# Patient Record
Sex: MALE | Race: WHITE | Employment: UNEMPLOYED | URBAN - METROPOLITAN AREA
[De-identification: names, ages, dates, MRNs, and addresses within clinical notes are randomized per-mention and may not be internally consistent; named-entity substitution may affect disease eponyms.]

---

## 2017-02-14 ENCOUNTER — HOSPITAL ENCOUNTER (INPATIENT)
Age: 62
LOS: 1 days | Discharge: HOME | DRG: 066 | End: 2017-02-15
Attending: EMERGENCY MEDICINE | Admitting: INTERNAL MEDICINE
Payer: MEDICARE

## 2017-02-14 ENCOUNTER — APPOINTMENT (OUTPATIENT)
Dept: MRI IMAGING | Age: 62
DRG: 066 | End: 2017-02-14
Attending: EMERGENCY MEDICINE
Payer: MEDICARE

## 2017-02-14 ENCOUNTER — APPOINTMENT (OUTPATIENT)
Dept: CT IMAGING | Age: 62
DRG: 066 | End: 2017-02-14
Attending: EMERGENCY MEDICINE
Payer: MEDICARE

## 2017-02-14 ENCOUNTER — APPOINTMENT (OUTPATIENT)
Dept: GENERAL RADIOLOGY | Age: 62
DRG: 066 | End: 2017-02-14
Attending: EMERGENCY MEDICINE
Payer: MEDICARE

## 2017-02-14 DIAGNOSIS — H49.21 ABDUCENS NERVE PALSY, RIGHT: ICD-10-CM

## 2017-02-14 DIAGNOSIS — R11.10 VOMITING, INTRACTABILITY OF VOMITING NOT SPECIFIED, PRESENCE OF NAUSEA NOT SPECIFIED, UNSPECIFIED VOMITING TYPE: ICD-10-CM

## 2017-02-14 DIAGNOSIS — H53.2 DOUBLE VISION: Primary | ICD-10-CM

## 2017-02-14 PROBLEM — I10 ACCELERATED HYPERTENSION: Chronic | Status: ACTIVE | Noted: 2017-02-14

## 2017-02-14 PROBLEM — Z86.73 HISTORY OF CVA (CEREBROVASCULAR ACCIDENT): Chronic | Status: ACTIVE | Noted: 2017-02-14

## 2017-02-14 LAB
ALBUMIN SERPL BCP-MCNC: 3.9 G/DL (ref 3.5–5)
ALBUMIN/GLOB SERPL: 0.9 {RATIO} (ref 1.1–2.2)
ALP SERPL-CCNC: 124 U/L (ref 45–117)
ALT SERPL-CCNC: 22 U/L (ref 12–78)
ANION GAP BLD CALC-SCNC: 9 MMOL/L (ref 5–15)
AST SERPL W P-5'-P-CCNC: 17 U/L (ref 15–37)
ATRIAL RATE: 67 BPM
BASOPHILS # BLD AUTO: 0.1 K/UL (ref 0–0.1)
BASOPHILS # BLD: 1 % (ref 0–1)
BILIRUB SERPL-MCNC: 0.7 MG/DL (ref 0.2–1)
BUN SERPL-MCNC: 11 MG/DL (ref 6–20)
BUN/CREAT SERPL: 9 (ref 12–20)
CALCIUM SERPL-MCNC: 8.7 MG/DL (ref 8.5–10.1)
CALCULATED P AXIS, ECG09: 66 DEGREES
CALCULATED R AXIS, ECG10: 17 DEGREES
CALCULATED T AXIS, ECG11: 83 DEGREES
CHLORIDE SERPL-SCNC: 103 MMOL/L (ref 97–108)
CO2 SERPL-SCNC: 27 MMOL/L (ref 21–32)
CREAT SERPL-MCNC: 1.17 MG/DL (ref 0.7–1.3)
DIAGNOSIS, 93000: NORMAL
EOSINOPHIL # BLD: 0.8 K/UL (ref 0–0.4)
EOSINOPHIL NFR BLD: 6 % (ref 0–7)
ERYTHROCYTE [DISTWIDTH] IN BLOOD BY AUTOMATED COUNT: 13.7 % (ref 11.5–14.5)
EST. AVERAGE GLUCOSE BLD GHB EST-MCNC: 120 MG/DL
GLOBULIN SER CALC-MCNC: 4.2 G/DL (ref 2–4)
GLUCOSE SERPL-MCNC: 121 MG/DL (ref 65–100)
HBA1C MFR BLD: 5.8 % (ref 4.2–6.3)
HCT VFR BLD AUTO: 46.7 % (ref 36.6–50.3)
HGB BLD-MCNC: 15.5 G/DL (ref 12.1–17)
LYMPHOCYTES # BLD AUTO: 16 % (ref 12–49)
LYMPHOCYTES # BLD: 2.4 K/UL (ref 0.8–3.5)
MCH RBC QN AUTO: 30.2 PG (ref 26–34)
MCHC RBC AUTO-ENTMCNC: 33.2 G/DL (ref 30–36.5)
MCV RBC AUTO: 91 FL (ref 80–99)
MONOCYTES # BLD: 0.9 K/UL (ref 0–1)
MONOCYTES NFR BLD AUTO: 6 % (ref 5–13)
NEUTS SEG # BLD: 10.6 K/UL (ref 1.8–8)
NEUTS SEG NFR BLD AUTO: 71 % (ref 32–75)
P-R INTERVAL, ECG05: 168 MS
PLATELET # BLD AUTO: 273 K/UL (ref 150–400)
POTASSIUM SERPL-SCNC: 4 MMOL/L (ref 3.5–5.1)
PROT SERPL-MCNC: 8.1 G/DL (ref 6.4–8.2)
Q-T INTERVAL, ECG07: 430 MS
QRS DURATION, ECG06: 118 MS
QTC CALCULATION (BEZET), ECG08: 454 MS
RBC # BLD AUTO: 5.13 M/UL (ref 4.1–5.7)
SODIUM SERPL-SCNC: 139 MMOL/L (ref 136–145)
TROPONIN I SERPL-MCNC: <0.04 NG/ML
VENTRICULAR RATE, ECG03: 67 BPM
WBC # BLD AUTO: 14.7 K/UL (ref 4.1–11.1)

## 2017-02-14 PROCEDURE — 74011000250 HC RX REV CODE- 250: Performed by: INTERNAL MEDICINE

## 2017-02-14 PROCEDURE — 96374 THER/PROPH/DIAG INJ IV PUSH: CPT

## 2017-02-14 PROCEDURE — 74011250637 HC RX REV CODE- 250/637: Performed by: INTERNAL MEDICINE

## 2017-02-14 PROCEDURE — 74011250636 HC RX REV CODE- 250/636: Performed by: RADIOLOGY

## 2017-02-14 PROCEDURE — 70553 MRI BRAIN STEM W/O & W/DYE: CPT

## 2017-02-14 PROCEDURE — 70450 CT HEAD/BRAIN W/O DYE: CPT

## 2017-02-14 PROCEDURE — A9585 GADOBUTROL INJECTION: HCPCS | Performed by: RADIOLOGY

## 2017-02-14 PROCEDURE — 93306 TTE W/DOPPLER COMPLETE: CPT

## 2017-02-14 PROCEDURE — 99285 EMERGENCY DEPT VISIT HI MDM: CPT

## 2017-02-14 PROCEDURE — 85025 COMPLETE CBC W/AUTO DIFF WBC: CPT | Performed by: EMERGENCY MEDICINE

## 2017-02-14 PROCEDURE — 70544 MR ANGIOGRAPHY HEAD W/O DYE: CPT

## 2017-02-14 PROCEDURE — 74011250637 HC RX REV CODE- 250/637: Performed by: NURSE PRACTITIONER

## 2017-02-14 PROCEDURE — 93880 EXTRACRANIAL BILAT STUDY: CPT

## 2017-02-14 PROCEDURE — 65660000000 HC RM CCU STEPDOWN

## 2017-02-14 PROCEDURE — 84484 ASSAY OF TROPONIN QUANT: CPT | Performed by: EMERGENCY MEDICINE

## 2017-02-14 PROCEDURE — 74011250636 HC RX REV CODE- 250/636: Performed by: INTERNAL MEDICINE

## 2017-02-14 PROCEDURE — 74011250636 HC RX REV CODE- 250/636: Performed by: NURSE PRACTITIONER

## 2017-02-14 PROCEDURE — 36415 COLL VENOUS BLD VENIPUNCTURE: CPT | Performed by: EMERGENCY MEDICINE

## 2017-02-14 PROCEDURE — 93005 ELECTROCARDIOGRAM TRACING: CPT

## 2017-02-14 PROCEDURE — 80053 COMPREHEN METABOLIC PANEL: CPT | Performed by: EMERGENCY MEDICINE

## 2017-02-14 PROCEDURE — 74011250636 HC RX REV CODE- 250/636: Performed by: EMERGENCY MEDICINE

## 2017-02-14 PROCEDURE — 83036 HEMOGLOBIN GLYCOSYLATED A1C: CPT | Performed by: NURSE PRACTITIONER

## 2017-02-14 PROCEDURE — 71010 XR CHEST PORT: CPT

## 2017-02-14 RX ORDER — SODIUM CHLORIDE 9 MG/ML
50 INJECTION, SOLUTION INTRAVENOUS CONTINUOUS
Status: DISCONTINUED | OUTPATIENT
Start: 2017-02-14 | End: 2017-02-15

## 2017-02-14 RX ORDER — SODIUM CHLORIDE 0.9 % (FLUSH) 0.9 %
5-10 SYRINGE (ML) INJECTION EVERY 8 HOURS
Status: DISCONTINUED | OUTPATIENT
Start: 2017-02-14 | End: 2017-02-15 | Stop reason: HOSPADM

## 2017-02-14 RX ORDER — AMLODIPINE BESYLATE 5 MG/1
5 TABLET ORAL DAILY
COMMUNITY
End: 2017-02-15

## 2017-02-14 RX ORDER — ATORVASTATIN CALCIUM 20 MG/1
40 TABLET, FILM COATED ORAL DAILY
Status: DISCONTINUED | OUTPATIENT
Start: 2017-02-15 | End: 2017-02-14 | Stop reason: SDUPTHER

## 2017-02-14 RX ORDER — ENOXAPARIN SODIUM 100 MG/ML
40 INJECTION SUBCUTANEOUS EVERY 24 HOURS
Status: DISCONTINUED | OUTPATIENT
Start: 2017-02-14 | End: 2017-02-14 | Stop reason: SDUPTHER

## 2017-02-14 RX ORDER — SODIUM CHLORIDE 0.9 % (FLUSH) 0.9 %
5-10 SYRINGE (ML) INJECTION AS NEEDED
Status: DISCONTINUED | OUTPATIENT
Start: 2017-02-14 | End: 2017-02-15 | Stop reason: HOSPADM

## 2017-02-14 RX ORDER — ATORVASTATIN CALCIUM 40 MG/1
40 TABLET, FILM COATED ORAL DAILY
COMMUNITY
End: 2017-02-15

## 2017-02-14 RX ORDER — DOCUSATE SODIUM 100 MG/1
100 CAPSULE, LIQUID FILLED ORAL 2 TIMES DAILY
Status: DISCONTINUED | OUTPATIENT
Start: 2017-02-14 | End: 2017-02-15 | Stop reason: HOSPADM

## 2017-02-14 RX ORDER — NALOXONE HYDROCHLORIDE 0.4 MG/ML
0.4 INJECTION, SOLUTION INTRAMUSCULAR; INTRAVENOUS; SUBCUTANEOUS AS NEEDED
Status: DISCONTINUED | OUTPATIENT
Start: 2017-02-14 | End: 2017-02-15 | Stop reason: HOSPADM

## 2017-02-14 RX ORDER — HYDRALAZINE HYDROCHLORIDE 20 MG/ML
20 INJECTION INTRAMUSCULAR; INTRAVENOUS
Status: DISCONTINUED | OUTPATIENT
Start: 2017-02-14 | End: 2017-02-15 | Stop reason: HOSPADM

## 2017-02-14 RX ORDER — IBUPROFEN 200 MG
1 TABLET ORAL EVERY 24 HOURS
Status: DISCONTINUED | OUTPATIENT
Start: 2017-02-14 | End: 2017-02-15 | Stop reason: HOSPADM

## 2017-02-14 RX ORDER — CODEINE PHOSPHATE AND GUAIFENESIN 10; 100 MG/5ML; MG/5ML
10 SOLUTION ORAL
Status: DISCONTINUED | OUTPATIENT
Start: 2017-02-14 | End: 2017-02-15 | Stop reason: HOSPADM

## 2017-02-14 RX ORDER — ONDANSETRON 2 MG/ML
4 INJECTION INTRAMUSCULAR; INTRAVENOUS ONCE
Status: COMPLETED | OUTPATIENT
Start: 2017-02-14 | End: 2017-02-14

## 2017-02-14 RX ORDER — LOSARTAN POTASSIUM 50 MG/1
25 TABLET ORAL DAILY
Status: DISCONTINUED | OUTPATIENT
Start: 2017-02-14 | End: 2017-02-15 | Stop reason: HOSPADM

## 2017-02-14 RX ORDER — DICLOFENAC SODIUM 75 MG/1
75 TABLET, DELAYED RELEASE ORAL
Status: ON HOLD | COMMUNITY
End: 2018-11-17

## 2017-02-14 RX ORDER — HYDROCODONE BITARTRATE AND ACETAMINOPHEN 5; 325 MG/1; MG/1
1 TABLET ORAL
Status: DISCONTINUED | OUTPATIENT
Start: 2017-02-14 | End: 2017-02-15 | Stop reason: HOSPADM

## 2017-02-14 RX ORDER — AMLODIPINE BESYLATE 5 MG/1
5 TABLET ORAL ONCE
Status: COMPLETED | OUTPATIENT
Start: 2017-02-14 | End: 2017-02-14

## 2017-02-14 RX ORDER — ATORVASTATIN CALCIUM 20 MG/1
20 TABLET, FILM COATED ORAL
Status: DISCONTINUED | OUTPATIENT
Start: 2017-02-14 | End: 2017-02-14

## 2017-02-14 RX ORDER — ACETAMINOPHEN 325 MG/1
650 TABLET ORAL
Status: DISCONTINUED | OUTPATIENT
Start: 2017-02-14 | End: 2017-02-15 | Stop reason: HOSPADM

## 2017-02-14 RX ORDER — LABETALOL HYDROCHLORIDE 5 MG/ML
20 INJECTION, SOLUTION INTRAVENOUS
Status: DISCONTINUED | OUTPATIENT
Start: 2017-02-14 | End: 2017-02-15 | Stop reason: HOSPADM

## 2017-02-14 RX ORDER — HYDROMORPHONE HYDROCHLORIDE 1 MG/ML
1 INJECTION, SOLUTION INTRAMUSCULAR; INTRAVENOUS; SUBCUTANEOUS
Status: DISCONTINUED | OUTPATIENT
Start: 2017-02-14 | End: 2017-02-15 | Stop reason: HOSPADM

## 2017-02-14 RX ORDER — METHOCARBAMOL 500 MG/1
500 TABLET, FILM COATED ORAL
Status: ON HOLD | COMMUNITY
End: 2018-11-17

## 2017-02-14 RX ORDER — ONDANSETRON 2 MG/ML
4 INJECTION INTRAMUSCULAR; INTRAVENOUS
Status: DISCONTINUED | OUTPATIENT
Start: 2017-02-14 | End: 2017-02-15 | Stop reason: HOSPADM

## 2017-02-14 RX ORDER — DIPHENHYDRAMINE HYDROCHLORIDE 50 MG/ML
12.5 INJECTION, SOLUTION INTRAMUSCULAR; INTRAVENOUS
Status: DISCONTINUED | OUTPATIENT
Start: 2017-02-14 | End: 2017-02-15 | Stop reason: HOSPADM

## 2017-02-14 RX ORDER — ENOXAPARIN SODIUM 100 MG/ML
40 INJECTION SUBCUTANEOUS EVERY 24 HOURS
Status: DISCONTINUED | OUTPATIENT
Start: 2017-02-14 | End: 2017-02-15 | Stop reason: HOSPADM

## 2017-02-14 RX ORDER — GUAIFENESIN 100 MG/5ML
81 LIQUID (ML) ORAL DAILY
Status: DISCONTINUED | OUTPATIENT
Start: 2017-02-14 | End: 2017-02-15

## 2017-02-14 RX ORDER — AMLODIPINE BESYLATE 5 MG/1
10 TABLET ORAL DAILY
Status: DISCONTINUED | OUTPATIENT
Start: 2017-02-15 | End: 2017-02-15 | Stop reason: HOSPADM

## 2017-02-14 RX ORDER — ATORVASTATIN CALCIUM 20 MG/1
40 TABLET, FILM COATED ORAL
Status: DISCONTINUED | OUTPATIENT
Start: 2017-02-14 | End: 2017-02-15

## 2017-02-14 RX ADMIN — ENOXAPARIN SODIUM 40 MG: 40 INJECTION SUBCUTANEOUS at 14:44

## 2017-02-14 RX ADMIN — LABETALOL HYDROCHLORIDE 20 MG: 5 INJECTION, SOLUTION INTRAVENOUS at 17:36

## 2017-02-14 RX ADMIN — Medication 10 ML: at 17:00

## 2017-02-14 RX ADMIN — GADOBUTROL 6.5 ML: 604.72 INJECTION INTRAVENOUS at 12:04

## 2017-02-14 RX ADMIN — HYDRALAZINE HYDROCHLORIDE 20 MG: 20 INJECTION INTRAMUSCULAR; INTRAVENOUS at 15:30

## 2017-02-14 RX ADMIN — GUAIFENESIN AND CODEINE PHOSPHATE 10 ML: 10; 100 LIQUID ORAL at 22:57

## 2017-02-14 RX ADMIN — ONDANSETRON 4 MG: 2 INJECTION INTRAMUSCULAR; INTRAVENOUS at 09:47

## 2017-02-14 RX ADMIN — AMLODIPINE BESYLATE 5 MG: 5 TABLET ORAL at 14:43

## 2017-02-14 RX ADMIN — HYDROCODONE BITARTRATE AND ACETAMINOPHEN 1 TABLET: 5; 325 TABLET ORAL at 21:06

## 2017-02-14 RX ADMIN — Medication 10 ML: at 21:09

## 2017-02-14 RX ADMIN — SODIUM CHLORIDE 50 ML/HR: 900 INJECTION, SOLUTION INTRAVENOUS at 17:00

## 2017-02-14 RX ADMIN — ATORVASTATIN CALCIUM 40 MG: 20 TABLET, FILM COATED ORAL at 21:06

## 2017-02-14 RX ADMIN — ASPIRIN 81 MG CHEWABLE TABLET 81 MG: 81 TABLET CHEWABLE at 14:43

## 2017-02-14 RX ADMIN — LOSARTAN POTASSIUM 25 MG: 50 TABLET, FILM COATED ORAL at 14:54

## 2017-02-14 NOTE — ROUTINE PROCESS
TRANSFER - OUT REPORT:    Verbal report given to [facility] RN(name) on Debra Hodge  being transferred to 5th floor remote telemetry(unit) for routine progression of care       Report consisted of patients Situation, Background, Assessment and   Recommendations(SBAR). Information from the following report(s) SBAR, ED Summary, MAR, Recent Results and Cardiac Rhythm sinus was reviewed with the receiving nurse. Lines:   Peripheral IV 02/14/17 Left Hand (Active)   Site Assessment Clean, dry, & intact 2/14/2017  9:26 AM   Phlebitis Assessment 0 2/14/2017  9:26 AM   Infiltration Assessment 0 2/14/2017  9:26 AM   Dressing Status Clean, dry, & intact 2/14/2017  9:26 AM        Opportunity for questions and clarification was provided.       Patient transported with:   [email or web address]

## 2017-02-14 NOTE — CONSULTS
[facility]  [facility] [address] [address]  [phone]     Inpatient Neurology Consult  [facility]    Name:   Jaime Perry record #: [id number]  Admission Date: 2/14/2017  Consult Date:  02/14/17    Referring Provider: Dr. Casey Brown  Chief Complaint:  Diplopia and vomiting  Source of Hx:  Chart, pt and girlfriend  _____________________________________________________________________    HISTORY OF PRESENT ILLNESS:   Subjective  Kurt Barba is a 57 y.o. male with PMH of CAD, hyperlipidemia, carotid stenosis, PVD, HTN, CVA hx, CAD, s/p PTCA and HA. The Neurology Service is asked to evaluate for new onset of diplopia, after admission for the above. He describes symptoms after waking this morning around 0230 with c/o vomiting when in the bathroom then he fell after noticing diplopia when he looked up and down. He went back to sleep and woke again at 0730 continuing to have diplopia. Tried to walk but was so off balance he stated to girlfriend he couldn't take a shower because of his poor balance. BP on arrival to ED was 255/176. He denied any other symptoms currently other than increased nausea when walking/moving around. States when he closes one eye diplopia resolves and when he tries to focus with his eyes, vision improves. Also states never had vision like this before. Has not started any new medications lately and doesn't see PCP often.        Past Medical History   Diagnosis Date    CAD (coronary artery disease)       [address]    Carotid artery disease ([facility] [address]     Cough     Diplopia 2/14/2017    Fatigue     Headache     History of CVA (cerebrovascular accident) 2/14/2017     CT head: chronic L BG infarct    Hyperlipidemia     Hypertension     Muscle weakness     PVD (peripheral vascular disease) [facility])      Past Surgical History   Procedure Laterality Date    Hx mohs procedure Right 1/2015    Hx orthopaedic Left 4/2015     rotator cuff manipulation  Pr cardiac surg procedure unlist       4 cardiac stents, 3 stens to L leg, 2 stens R leg, 1 stent to abdomen    Pr neurological procedure unlisted       EMG     Family History   Problem Relation Age of Onset    Heart Disease Brother      Social History     Social History    Marital status:      Spouse name: N/A    Number of children: N/A    Years of education: N/A     Occupational History    Not on file. Social History Main Topics    Smoking status: Current Every Day Smoker     Packs/day: 0.25     Types: Cigarettes     Last attempt to quit: 2/25/2016    Smokeless tobacco: Not on file    Alcohol use 12.0 oz/week     24 Cans of beer per week    Drug use: No    Sexual activity: Not on file     Other Topics Concern    Not on file     Social History Narrative       Objective  Allergies:   No Known Allergies  Outpatient Meds  No current facility-administered medications on file prior to encounter. No current outpatient prescriptions on file prior to encounter. Inpatient Meds    Current Facility-Administered Medications:     amLODIPine (NORVASC) tablet 5 mg, 5 mg, Oral, ONCE, Rosalio LUEVANO Do, MD    losartan (COZAAR) tablet 25 mg, 25 mg, Oral, DAILY, Rosalio LUEVANO Do, MD    aspirin chewable tablet 81 mg, 81 mg, Oral, DAILY, Rosalio LUEVANO Do, MD    hydrALAZINE (APRESOLINE) 20 mg/mL injection 20 mg, 20 mg, IntraVENous, Q6H PRN, Rosalio LUEVANO Do, MD    gadobutrol (Gadavist) contrast solution 6.5 mL, 6.5 mL, IntraVENous, RAD ONCE, Arie Douglas MD    enoxaparin (LOVENOX) injection 40 mg, 40 mg, SubCUTAneous, Q24H, Adam Dos Santos NP    Current Outpatient Prescriptions:     amLODIPine (NORVASC) 5 mg tablet, Take 5 mg by mouth daily. , Disp: , Rfl:     diclofenac EC (VOLTAREN) 75 mg EC tablet, Take 75 mg by mouth two (2) times daily as needed. , Disp: , Rfl:     methocarbamol (ROBAXIN) 500 mg tablet, Take 500 mg by mouth nightly as needed (muscle pain). , Disp: , Rfl:     atorvastatin (LIPITOR) 40 mg tablet, Take 40 mg by mouth daily. , Disp: , Rfl:     dextromethorphan-guaiFENesin (ROBITUSSIN-DM)  mg/5 mL syrup, Take 10 mL by mouth every four (4) hours as needed for Cough. , Disp: , Rfl:     PHYSICAL EXAM  Patient Vitals for the past 12 hrs:   Temp Pulse Resp BP SpO2   02/14/17 1133 - 64 13 - 93 %   02/14/17 1130 - 67 9 194/79 -   02/14/17 1045 - 64 13 (!) 184/92 95 %   02/14/17 1030 - 61 18 (!) 203/86 95 %   02/14/17 1020 - 63 18 198/84 94 %   02/14/17 0945 - 66 18 (!) 203/94 95 %   02/14/17 0930 - 66 - (!) 202/77 95 %   02/14/17 0923 - 67 - 199/82 95 %   02/14/17 0910 - - - (!) 240/101 97 %   02/14/17 0907 97.5 °F (36.4 °C) 85 18 (!) 255/176 97 %        General: WM in NAD, providing accurate history    Psych: Affect is calm, cooperative, pleasant   Neck: supple, nontender,  No bruit   Heart: regular rate and rhythm   Lungs: clear BBS   Extremities: no LE edema   Skin: no rashes      Neurological Examination:    Mental Status:  Alert, oriented x 4, Good insight and judgement    Commands:   Following appropriately    Language: no aphasia or dysarthria    Comprehension: intact     Cranial Nerves:            I: smell   Not tested    II: visual acuity    deferred    II: visual fields   Full to confrontation    II: pupils   Equal, round, reactive to light    II: optic disc   Not examined    III,VII:   no ptosis of either eyelid    III,IV,VI: extraocular muscles    Full EOM, no nystagmus, R medial CN intranuclear palsy    V: mastication   symmetrical    V: facial sensation:    Equal V1, V2 and V3 bilaterally with LT    VII: facial muscle function     Symmetric, no facial droop    VIII: hearing   Equal bilaterally    IX: soft palate elevation    Uvula midline, elevates symetrically    XI: trapezius strength    5/5    XI: sternocleidomastoid strength   5/5    XI: neck flexion strength    5/5     XII: tongue    Protrudes midline, no fasciculations or atrophy      Strength/Motor   Drift:       None           Bulk: appears symmetric          Tone:  normal     Deltoid Biceps Triceps Wrist Extension Finger Abduction   L 5 5 5 5 5   R 5 5 5 5 5      Hip Flexion Hip Extension Knee Flexion Knee Extension Ankle Dorsiflexion Ankle Plantarflexion   L 4 4 4 4 4 5   R 5 5 5 5 5 5      Reflexes:    BR Brachial Patellar Achilles Babinski Startle Glabellar   L 2/4 2/4 2/4 NT  downgoing NT NT   R 2/4 2/4 2/4 NT downgoing NT NT      Sensory: intact on proximal & distal extremity w/ LT, pressure, temp, and proprioception bilaterally   Coordination: no resting tremors, no intention tremors   FNF: Intact bilaterally    Heel to shin:  Intact on L FNF, Dysmetria w/ R FNF     Gait:  deferred     *FE:  Unable to assess due to reduced comprehension, agitation or reduced LOC. Labs Reviewed  Recent Results (from the past 12 hour(s))   CBC WITH AUTOMATED DIFF    Collection Time: 02/14/17  9:28 AM   Result Value Ref Range    WBC 14.7 (H) 4.1 - 11.1 K/uL    RBC 5.13 4.10 - 5.70 M/uL    HGB 15.5 12.1 - 17.0 g/dL    HCT 46.7 36.6 - 50.3 %    MCV 91.0 80.0 - 99.0 FL    MCH 30.2 26.0 - 34.0 PG    MCHC 33.2 30.0 - 36.5 g/dL    RDW 13.7 11.5 - 14.5 %    PLATELET 249 860 - 772 K/uL    NEUTROPHILS 71 32 - 75 %    LYMPHOCYTES 16 12 - 49 %    MONOCYTES 6 5 - 13 %    EOSINOPHILS 6 0 - 7 %    BASOPHILS 1 0 - 1 %    ABS. NEUTROPHILS 10.6 (H) 1.8 - 8.0 K/UL    ABS. LYMPHOCYTES 2.4 0.8 - 3.5 K/UL    ABS. MONOCYTES 0.9 0.0 - 1.0 K/UL    ABS. EOSINOPHILS 0.8 (H) 0.0 - 0.4 K/UL    ABS.  BASOPHILS 0.1 0.0 - 0.1 K/UL   METABOLIC PANEL, COMPREHENSIVE    Collection Time: 02/14/17  9:28 AM   Result Value Ref Range    Sodium 139 136 - 145 mmol/L    Potassium 4.0 3.5 - 5.1 mmol/L    Chloride 103 97 - 108 mmol/L    CO2 27 21 - 32 mmol/L    Anion gap 9 5 - 15 mmol/L    Glucose 121 (H) 65 - 100 mg/dL    BUN 11 6 - 20 MG/DL    Creatinine 1.17 0.70 - 1.30 MG/DL    BUN/Creatinine ratio 9 (L) 12 - 20      GFR est AA >60 >60 ml/min/1.73m2    GFR est non-AA >60 >60 ml/min/1.73m2 Calcium 8.7 8.5 - 10.1 MG/DL    Bilirubin, total 0.7 0.2 - 1.0 MG/DL    ALT (SGPT) 22 12 - 78 U/L    AST (SGOT) 17 15 - 37 U/L    Alk. phosphatase 124 (H) 45 - 117 U/L    Protein, total 8.1 6.4 - 8.2 g/dL    Albumin 3.9 3.5 - 5.0 g/dL    Globulin 4.2 (H) 2.0 - 4.0 g/dL    A-G Ratio 0.9 (L) 1.1 - 2.2     TROPONIN I    Collection Time: 02/14/17  9:28 AM   Result Value Ref Range    Troponin-I, Qt. <0.04 <0.05 ng/mL     Imaging  Reviewed:   CT Results (recent):    Results from Hospital Encounter encounter on 02/14/17   CT HEAD WO CONT   Narrative EXAM:  CT HEAD WO CONT    INDICATION:   CN 6 PALSY ON RIGHT AND NO UPWARD GAZE. COMPARISON: None. TECHNIQUE: Unenhanced CT of the head was performed using 5 mm images. Brain and  bone windows were generated. CT dose reduction was achieved through use of a  standardized protocol tailored for this examination and automatic exposure  control for dose modulation. FINDINGS:  The ventricles and sulci are normal in size, shape and configuration and  midline. There is lacunar infarct in the left basal ganglia which is chronic in  appearance. White matter otherwise has unremarkable appearance. Skull base  unremarkable. . There is no intracranial hemorrhage, extra-axial collection,  mass, mass effect or midline shift. The basilar cisterns are open. No acute  infarct is identified. The bone windows demonstrate no abnormalities. There is  slight mucosal thickening in central frontal sinuses and adjacent ethmoid air  cells. .         Impression IMPRESSION:   1. No acute finding in the brain. 2. Old left basal ganglial lacunar infarct. 3. Minimal inflammatory changes or nasal sinuses. MRI Results (recent):    Results from [place] encounter on 11/18/15   MRI SHOULDER LT WO CONT   Narrative **Final Report**      ICD Codes / Adm. Diagnosis: 727.61  M75.122 / Complete rupture of rotator cu    Complete rotator cuff tear o  Examination:  MR SHOULDER WO CON LT  - [id number] - Nov 18 2015  8:27AM  Accession No:  [id number]  Reason:  rotator cuff tear      REPORT:  INDICATION: M 75.122, complete rotator cuff tear? Chronic history of left   shoulder pain without injury, worsening. COMPARISON: None    EXAM: Oblique coronal T1-weighted spin-echo, axial fat suppressed proton   density weighted fast spin echo, oblique coronal fat-suppressed proton   density and T2-weighted fast spin-echo, and oblique sagittal fat-suppressed   T2-weighted fast spin-echo MR images of the left shoulder are obtained. FINDINGS: There is moderate osteoarthrosis of the common clavicular joint. There is a type 1-2 acromion. Mild edema of the distal clavicle is   demonstrated. There is diffuse rotator cuff tendinopathy. A prominent partial-thickness   tear of the mid to posterior supraspinatus tendon is shown with question of   a small full-thickness component. The finding is located predominantly along   the bursal side along its more anterior extent, and along the articular side   at its more posterior extent. There is no tendon retraction, muscle atrophy   or muscle edema. The long head biceps tendon is anatomically located; there   is heterogeneous signal at its origin as well as a focus of signal change   within the posterosuperior labrum raising the possibility for superior   labral tear with anterior and posterior extension, with involvement of the   origin of the long head biceps tendon. There is a small to moderate effusion of the glenohumeral joint as well as a   small effusion of the subacromial subdeltoid bursa. The glenohumeral   effusion is somewhat complex in appearance with internal filling defects,   possibly representing synovitis, or retracted hemorrhagic blood products, or   loose bodies. No substantial glenohumeral chondral derangement is evident. No soft tissue mass is shown. IMPRESSION:   1.  Rotator cuff tendinopathy with prominent partial-thickness tearing of mid to posterior supraspinatus tendon. 2. Heterogeneous signal at origin of the long head biceps tendon extending   into the posterior superior labrum raising concern for superior labral tear   with anterior and posterior extension involving origin of long head biceps   tendon. 3. Small to moderate complex glenohumeral effusion. Signing/Reading Doctor: Jeannine Pineda. Eve Shelley ([id number])    Approved: THOMAS Shelley ([id number])  Nov 18 2015 10:11AM                                  _____________________________________________________________________    Review of Systems: 10 point ROS was performed. Pertinent positives listed in HPI. Denies:angina, palpitations, paresthesias, weakness, vision loss, slurred speech, aphasia, confusion, fever, chills, headache, back pain, neck pain, prior episodes of vertigo, hallucinations, new medications or dosage changes. _____________________________________________________________________  Impression  57 y.o. male with onset of acute diplopia starting this morning. Exam findings of R medial 6th CN palsy, bilateral upward and left gazed nystagmus low amplitude and mild LLE weakness. LLE reduced sensation is chronic x6mo per report. Imaging reviewed: CT head with chronic BG infarct. Notable Labs include WBC of 14.7 and glucose of 121. Feel with hx of chronic CVA, hyperlipidemia, HTN and tobacco abuse hx pt has high risk factors for potential acute cerebrovascular event either in occipital lobe or brainstem. BP on arrival to ED was 255/176. Will await MRI/MRA results to help better define appropriate plan for patient. Refer to plan below. Assessment:  1.  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 Bilateral nystagmus  3. HTN, uncontrolled  4. CVA hx  L BG  5. Tobacco abuse hx  6.  CAD  7.  s/p PTCA    Plan  · Agree with MRI brain w/without contrast and MRA and starting ASA/statin  · Stroke teaching and RN dysphagia screen to be done  · LDL and A1c in AM fasting  · Happy Cozaar was started to help improve HTN. · Need to start new oral statin, will place Lisinopril as allergy since it caused cough  ·   Continue great care by collaborating care team and nursing staff. ·  Testing results discussed with patient and/or family and any questions were answered. My collaborating care team physician may have further recommendations.     On DVT Prophylaxis yes no   Continue lovenox subq while inpatient x      Care Plan discussed with:  Patient x   Family    RN x   Care Manager    Consultant/Specialist:     Patient will be discussed with Dr. Dain Medina  ______________________________________________________________  Hospital Problems  Date Reviewed: 2/14/2017          Codes Class Noted POA    History of CVA (cerebrovascular accident) (Chronic) ICD-10-CM: Z86.73  ICD-9-CM: V12.54  2/14/2017 Yes    Overview Signed 2/14/2017 11:04 AM by Jeffrey Johnson NP     CT head: chronic L BG infarct             PVD (peripheral vascular disease) ([facility] [address] ICD-10-CM: I73.9  ICD-9-CM: 443.9  Unknown Yes        CAD (coronary artery disease) ICD-10-CM: I25.10  ICD-9-CM: 414.00  Unknown Yes    Overview Signed 2/14/2017 11:26 AM by Jeffrey Johnson NP     MI 1996             * (Principal)Diplopia ICD-10-CM: H53.2  ICD-9-CM: 368.2  2/14/2017 Yes        Vomiting ICD-10-CM: R11.10  ICD-9-CM: 787.03  2/14/2017 No    Overview Signed 2/14/2017 11:29 AM by Jeffrey Johnson NP     PTA, occurred this AM             Accelerated hypertension (Chronic) ICD-10-CM: I10  ICD-9-CM: 401.0  2/14/2017 Yes        Carotid stenosis ICD-10-CM: I65.29  ICD-9-CM: 433.10  4/10/2015 Yes              *Thank you for allowing [facility], to participate in the care of your patient.    ================================================    ADDENDUM--> Collaborating Care Team Physician:

## 2017-02-14 NOTE — ED PROVIDER NOTES
HPI Comments: 65-year-old male with a prior history of hypertension, hyperlipidemia, coronary artery disease here with double vision and vomiting. Patient states he went to bed in his usual state of health and awoke at 2:30 in the morning today with double vision. He fell at that time. He went back to sleep and awoke again at 7:30 in the morning with double vision. He states that when he looks upward, he has severe double vision. No other focal weakness or numbness. Denies any headache. He did take his amlodipine this morning and vomited after he got to the emergency room. He states that his medication was down greater than 30 minutes. Denies any chest discomfort, shortness of breath, abdominal pain, fevers, headache or other complaints. Social history: Positive smoker. Positive alcohol. No drug use. The history is provided by the patient (girlfriend). Past Medical History:   Diagnosis Date    CAD (coronary artery disease)      MI 1995    Carotid artery disease (HCC)     Cough     Fatigue     Headache     Hyperlipidemia     Hypertension     Muscle weakness     PVD (peripheral vascular disease) ([facility] [address]        Past Surgical History:   Procedure Laterality Date    Hx mohs procedure Right 1/2015    Hx orthopaedic Left 4/2015     rotator cuff manipulation    Pr cardiac surg procedure unlist       4 cardiac stents, 3 stens to L leg, 2 stens R leg, 1 stent to abdomen    Pr neurological procedure unlisted       EMG         History reviewed. No pertinent family history. Social History     Social History    Marital status:      Spouse name: N/A    Number of children: N/A    Years of education: N/A     Occupational History    Not on file.      Social History Main Topics    Smoking status: Former Smoker     Packs/day: 0.25     Types: Cigarettes     Quit date: 2/25/2016    Smokeless tobacco: Not on file    Alcohol use 12.0 oz/week     24 Cans of beer per week    Drug use: No    Sexual activity: Not on file     Other Topics Concern    Not on file     Social History Narrative         ALLERGIES: Review of patient's allergies indicates no known allergies. Review of Systems   Constitutional: Negative for fever. Eyes: Positive for visual disturbance. Negative for photophobia and pain. Gastrointestinal: Positive for nausea and vomiting. Negative for abdominal pain. Neurological: Negative for headaches. All other systems reviewed and are negative. Vitals:    02/14/17 0907 02/14/17 0910 02/14/17 0923   BP: (!) 255/176 (!) 240/101 199/82   Pulse: 85  67   Resp: 18     Temp: 97.5 °F (36.4 °C)     SpO2: 97% 97% 95%   Weight: 65.8 kg (145 lb)     Height: 5' 9\" (1.753 m)              Physical Exam   Constitutional: He is oriented to person, place, and time. He appears well-developed and well-nourished. HENT:   Head: Normocephalic and atraumatic. Mouth/Throat: Oropharynx is clear and moist. No oropharyngeal exudate. Eyes: Conjunctivae are normal. Pupils are equal, round, and reactive to light. Right eye exhibits no discharge. Left eye exhibits no discharge. No scleral icterus. Neck: Normal range of motion. Neck supple. Cardiovascular: Normal rate, regular rhythm and normal heart sounds. Exam reveals no gallop and no friction rub. No murmur heard. Pulmonary/Chest: Effort normal and breath sounds normal. No respiratory distress. He has no wheezes. He has no rales. Abdominal: Soft. Bowel sounds are normal. He exhibits no distension. There is no tenderness. There is no guarding. Musculoskeletal: Normal range of motion. He exhibits no edema or tenderness. Lymphadenopathy:     He has no cervical adenopathy. Neurological: He is alert and oriented to person, place, and time. A cranial nerve deficit (no lateral gaze with right eye and no upward gaze with either eye) is present. Coordination normal.   Skin: Skin is warm and dry. No rash noted. No pallor.    Nursing note and vitals reviewed. MDM  Number of Diagnoses or Management Options  Diagnosis management comments: 79-year-old male here with right cranials nerve 6 palsy as well as unable to to have upward gaze. Positive double vision. Onset last night when he awoke at 2:30 in the morning. Patient awoke with the symptoms and well greater than 4-1/2 hours since symptom onset so would not be candidate for TPA. Other considerations are mass lesion, intracranial hemorrhage. Will await head CT determine blood pressure management. His blood pressures are a decreased significantly and currently 202/77. His initial blood pressure was 255/176. Diastolic may not have been accurate. Repeat blood pressure was 240/101 prior to my checking. Differential diagnosis includes CVA, mass lesion, intracranial hemorrhage, hypertension related and others. Check head CT, chest x-ray, labs. ED Course         Procedures    9:49 AM  D/w Dr. Amrita Thomas, radiology re: imaging. Recommends ct head without contrast then MRI/MRA if etiology not found on CT    10:42 AM  CT head shows no acute finding. Will order MRI and consult neurology. Will discuss with Urology regarding HTN management. 10:46 AM  Attempted to call hospitalist with no answer. 10:57 AM  Pt's BP is now 184/92. Will not intervene at this point. 10:58 PM  Patient is being admitted to the hospital.  The results of their tests and reasons for their admission have been discussed with them and/or available family. They convey agreement and understanding for the need to be admitted and for their admission diagnosis. Consultation will be made now with the inpatient physician for hospitalization. CONSULT NOTE:  10:58 AM Akhil Hurley MD spoke with Dr. Darren Gilliland, Consult for Hospitalist.  Discussed available diagnostic tests and clinical findings. He is in agreement with care plans as outlined.   Dr. Darren Gilliland recommends performing an MRV and will admit the patient to the [facility].

## 2017-02-14 NOTE — ED TRIAGE NOTES
Patient arrived through triage c/o waking up from sleep with blurred vision, caused him to fall no loc or head injury, caused him to become nauseous and he vomited, went back to sleep and woke back up this morning with the same. Patient states he did take amlodipine this morning but did have an episode of vomiting right after. Patient is resting in bed, bed in low position, call bell in reach, monitor x3.

## 2017-02-14 NOTE — PROGRESS NOTES
5:17 PM /81, HR 82, patient had IV hydralazin 20mg-given at 3:30PM by ED RN, spoke with Dr. Megan Alicia, Dr. Carlos Alberto Marroquin states the need for better bp control, called Dr. Hayley Park MD states to order IV 20mg Labetolol for sbp>160, hold for HR <65, MD states he will review chart in AM for additional PO BP meds, Will continue to monitor patient.

## 2017-02-14 NOTE — ED NOTES
Pt medicated per MAR and swallow study done. Pt resting comfortably at this time. Denies any pain or distress, states he still has some double vision.

## 2017-02-14 NOTE — CONSULTS
NAME:  Cristina Multani   :  1955  MRN:  [id number]  Date:  2017   PCP:    Brooklyn Cardozo MD  ___________________________________________________________________________________    1140am:  Attempted to see patient in consult, was being taken away to MRI. Will see pt after MRI  ---VALDO Bose    Neurology:    · Consult received, chart reviewed. Patient to be seen this AM or early afternoon.     _______________  Richy Li

## 2017-02-14 NOTE — PROGRESS NOTES
[facility]: MED RECONCILIATION    Comments/Recommendations:    Patient is awake, alert, and knowledgeable about home medications    Verifies no known allergies   Admitted noncompliance  o Patient does not like to take pills and this is his reported barrier to compliance  o Atorvastatin 40mg daily - last documented prescription refill was June 2016 for 90 tablets  o Aspirin 81mg daily - Prescribed but the patient has never taken this medication   Blood pressure  o Not monitored at home  o The patient took lisinopril 20mg daily for 10 years and reports it controlled his blood pressure well. In the last year he developed a cough. Four months ago the lisinopril was stopped and the patient started amlodipine 5mg daily.  The patient reports his blood pressure has not been well controlled on amlodipine with an SBP range of 150 to 160  o Patient reports a significant cardiac history with multiple stents placed   Counseled the patient on the importance of following his prescribed regimen    Medications added:     · Diclofenac  · Methocarbamol  · Atorvastatin    Information obtained from: patient, rx query    Significant PMH/Disease States:   Patient Active Problem List   Diagnosis Code    Disturbance of skin sensation R20.9    Carotid stenosis I65.29    History of CVA (cerebrovascular accident) Z80.78    PVD (peripheral vascular disease) ([facility] [address] I73.9    Muscle weakness M62.81    Hypertension I10    Hyperlipidemia E78.5    Headache R51    Carotid artery disease ([address] I77.9    CAD (coronary artery disease) I25.10    Fatigue R53.83    Cough R05    Diplopia H53.2    Vomiting R11.10     Past Medical History   Diagnosis Date    CAD (coronary artery disease)      MI 1995    Carotid artery disease ([facility] [address]     Cough     Diplopia 2/14/2017    Fatigue     Headache     History of CVA (cerebrovascular accident) 2/14/2017     CT head: chronic L BG infarct    Hyperlipidemia     Hypertension     Muscle weakness     PVD (peripheral vascular disease) [facility])      Chief Complaint for this Admission:   Chief Complaint   Patient presents with    Blurred Vision    Nausea     Allergies: Review of patient's allergies indicates no known allergies. Prior to Admission Medications:     Medication Documentation Review Audit       Reviewed by Tamara Soto PHARMD (Pharmacist) on 02/14/17 at 1125         Medication Sig Documenting Provider Last Dose Status Taking? amLODIPine (NORVASC) 5 mg tablet Take 5 mg by mouth daily. Nick Batres, MD 2/14/2017 Unknown time Active Yes    atorvastatin (LIPITOR) 40 mg tablet Take 40 mg by mouth daily. Historical Provider 2/7/2017 Unknown time Active Yes    diclofenac EC (VOLTAREN) 75 mg EC tablet Take 75 mg by mouth two (2) times daily as needed. Historical Provider  Active Yes    methocarbamol (ROBAXIN) 500 mg tablet Take 500 mg by mouth nightly as needed (muscle pain).  Historical Provider  Active Yes                  Thank you,    Yong Gonzales, CammyD, BCPS

## 2017-02-14 NOTE — PROGRESS NOTES
4:50PM TRANSFER - IN REPORT:    Verbal report received from Alexa Kelly RN(name) on Candelaria Ocampo  being received from ED(unit) for routine progression of care      Report consisted of patients Situation, Background, Assessment and   Recommendations(SBAR). Information from the following report(s) SBAR and Kardex was reviewed with the receiving nurse. Opportunity for questions and clarification was provided. Assessment completed upon patients arrival to unit and care assumed.

## 2017-02-14 NOTE — PROGRESS NOTES
2/14/2017  CARE MANAGEMENT NOTE:  CM is following pt in the ER for initial discharge planning. EMR reviewed. CM met with pt who was his own historian for this needs assessment. Reportedly, pt resides alone in a one story [place] home. PTA, pt was ambulatory, indepn with ADLs, and drives. He has RX drug coverage thru [facility] and he uses [facility] in [place]. Pt does not have home healthcare currently. DME - none. PCP is Dr. Deepa Mosher. Plan is to return home when medically stable.   Erich

## 2017-02-14 NOTE — ED NOTES
Pt sitting up eating a sub sandwich that his wife brought in for him. Does not appear to be in any distress or having any difficulties.

## 2017-02-14 NOTE — IP AVS SNAPSHOT
Current Discharge Medication List  
  
Take these medications at their scheduled times Dose & Instructions Dispensing Information Comments Morning Noon Evening Bedtime  
 amLODIPine 10 mg tablet Commonly known as:  Kofi Alstrom Your next dose is: Today, Tomorrow Other:  ____________ Dose:  10 mg Take 1 Tab by mouth daily. Quantity:  30 Tab Refills:  1  
     
   
   
   
  
 aspirin 81 mg chewable tablet Your next dose is: Today, Tomorrow Other:  ____________ Dose:  81 mg Take 1 Tab by mouth daily. Quantity:  30 Tab Refills:  1  
     
   
   
   
  
 atorvastatin 80 mg tablet Commonly known as:  LIPITOR Your next dose is: Today, Tomorrow Other:  ____________ Dose:  80 mg Take 1 Tab by mouth nightly. Quantity:  30 Tab Refills:  1  
     
   
   
   
  
 labetalol 100 mg tablet Commonly known as:  Jannetta Pore Your next dose is: Today, Tomorrow Other:  ____________ Dose:  100 mg Take 1 Tab by mouth every twelve (12) hours. Quantity:  60 Tab Refills:  1  
     
   
   
   
  
 losartan 25 mg tablet Commonly known as:  COZAAR Your next dose is: Today, Tomorrow Other:  ____________ Dose:  25 mg Take 1 Tab by mouth daily. Quantity:  30 Tab Refills:  1 Take these medications as needed Dose & Instructions Dispensing Information Comments Morning Noon Evening Bedtime  
 dextromethorphan-guaiFENesin  mg/5 mL syrup Commonly known as:  ROBITUSSIN-DM Your next dose is: Today, Tomorrow Other:  ____________ Dose:  10 mL Take 10 mL by mouth every four (4) hours as needed for Cough. Refills:  0  
     
   
   
   
  
 diclofenac EC 75 mg EC tablet Commonly known as:  VOLTAREN Your next dose is: Today, Tomorrow Other:  ____________  Dose:  75 mg  
 Take 75 mg by mouth two (2) times daily as needed. Refills:  0 HYDROcodone-acetaminophen 5-325 mg per tablet Commonly known as:  Edgar Garcia Your next dose is: Today, Tomorrow Other:  ____________ Dose:  1 Tab Take 1 Tab by mouth every four (4) hours as needed. Max Daily Amount: 6 Tabs. Quantity:  15 Tab Refills:  0  
     
   
   
   
  
 methocarbamol 500 mg tablet Commonly known as:  ROBAXIN Your next dose is: Today, Tomorrow Other:  ____________ Dose:  500 mg Take 500 mg by mouth nightly as needed (muscle pain). Refills:  0 Where to Get Your Medications Information about where to get these medications is not yet available ! Ask your nurse or doctor about these medications  
  amLODIPine 10 mg tablet  
 aspirin 81 mg chewable tablet  
 atorvastatin 80 mg tablet HYDROcodone-acetaminophen 5-325 mg per tablet  
 labetalol 100 mg tablet  
 losartan 25 mg tablet

## 2017-02-14 NOTE — H&P
Terrell Varma [facility]  [address], [address] [place] [address]  [phone]    Admission History and Physical      NAME:  Shanice Hwang   :   1955   MRN:  [id number]     PCP:  Randi Ugalde MD     Date/Time:  2017         Subjective:     CHIEF COMPLAINT: double vision     HISTORY OF PRESENT ILLNESS:     The patient is a 58 yo hx of HTN, CAD, PVD, presented w/ binocular diplopia. The patient stated that his symptoms started at 2:30am today. He noticed seeing double with both eyes opened, but resolved when closing one eye. This was associated with blurry vision and photophobia. Denied chest pain, SOB, fevers, chills, nausea, vomiting, headache. Denied hx of strokes. In the ED, blood pressure was elevated at 180/90. Head CT showed an old L basal ganglia infarct, but no acute issues. No Known Allergies    Prior to Admission medications    Medication Sig Start Date End Date Taking? Authorizing Provider   amLODIPine (NORVASC) 5 mg tablet Take 5 mg by mouth daily. Yes Phys Other, MD   diclofenac EC (VOLTAREN) 75 mg EC tablet Take 75 mg by mouth two (2) times daily as needed. Yes Historical Provider   methocarbamol (ROBAXIN) 500 mg tablet Take 500 mg by mouth nightly as needed (muscle pain). Yes Historical Provider   atorvastatin (LIPITOR) 40 mg tablet Take 40 mg by mouth daily. Yes Historical Provider   dextromethorphan-guaiFENesin (ROBITUSSIN-DM)  mg/5 mL syrup Take 10 mL by mouth every four (4) hours as needed for Cough.    Yes Historical Provider       Past Medical History   Diagnosis Date    CAD (coronary artery disease)      MI     Carotid artery disease [facility] [address]     Cough     Diplopia 2017    Fatigue     Headache     History of CVA (cerebrovascular accident) 2017     CT head: chronic L BG infarct    Hyperlipidemia     Hypertension     Muscle weakness     PVD [facility]peripheral vascular disease) [facility]         Past Surgical History Procedure Laterality Date    Hx mohs procedure Right 1/2015    Hx orthopaedic Left 4/2015     rotator cuff manipulation    Pr cardiac surg procedure unlist       4 cardiac stents, 3 stens to L leg, 2 stens R leg, 1 stent to abdomen    Pr neurological procedure unlisted       EMG       Social History   Substance Use Topics    Smoking status: Current Every Day Smoker     Packs/day: 0.25     Types: Cigarettes     Last attempt to quit: 2/25/2016    Smokeless tobacco: Not on file    Alcohol use 12.0 oz/week     24 Cans of beer per week        Family History   Problem Relation Age of Onset    Heart Disease Brother         Review of Systems:  (bold if positive, if negative)    Gen:  Eyes:  Visual changes,ENT:  CVS:  Pulm:  GI:    :    MS:  Skin:  Psych:  Endo:    Hem:  Renal:    Neuro:          Objective:      VITALS:    Vital signs reviewed; most recent are:    Visit Vitals    /79    Pulse 64    Temp 97.5 °F (36.4 °C)    Resp 13    Ht 5' 9\" (1.753 m)    Wt 65.8 kg (145 lb)    SpO2 93%    BMI 21.41 kg/m2     SpO2 Readings from Last 6 Encounters:   02/14/17 93%   06/12/15 98%   06/08/15 100%   04/10/15 97%        No intake or output data in the 24 hours ending 02/14/17 1149     Exam:     Physical Exam:    Gen:  Well-developed, well-nourished, in no acute distress  HEENT:  Pink conjunctivae, PERRL, hearing intact to voice, moist mucous membranes  Neck:  Supple, without masses, thyroid non-tender  Resp:  No accessory muscle use, clear breath sounds without wheezes rales or rhonchi  Card:  No murmurs, normal S1, S2 without thrills, bruits or peripheral edema  Abd:  Soft, non-tender, non-distended, normoactive bowel sounds are present, no palpable organomegaly and no detectable hernias  Lymph:  No cervical adenopathy  Musc:  No cyanosis or clubbing  Skin:  No rashes or ulcers, skin turgor is good  Neuro:  Difficulty with upward gaze, no nystagmus,  strength is 5/5 bilaterally, dorsi / plantarflexion strength is 5/5 bilaterally, follows commands appropriately  Psych:  Alert with good insight. Oriented to person, place, and time    Labs:    Recent Labs      02/14/17   0928   WBC  14.7*   HGB  15.5   HCT  46.7   PLT  273     Recent Labs      02/14/17   0928   NA  139   K  4.0   CL  103   CO2  27   GLU  121*   BUN  11   CREA  1.17   CA  8.7   ALB  3.9   TBILI  0.7   SGOT  17   ALT  22     Lab Results   Component Value Date/Time    Glucose (POC) 115 09/05/2010 08:30 AM     No results for input(s): PH, PCO2, PO2, HCO3, FIO2 in the last 72 hours. No results for input(s): INR in the last 72 hours. No lab exists for component: INREXT    Radiology and EKG reviewed:   Head CT reviewed    **[facility]**       Assessment/Plan:       Principal Problem:    58 yo hx of HTN, CAD, PVD, presented w/ binocular diplopia    1) Binocular Diplopia: concern for extraocular muscle defer, cranial nerve palsy. Multiple risk factors for CVA. Head CT showed an old L basal ganglia infarct. Will obtain a head MRI/MRA, carotid dopplers, echo. Check A1C, lipids. Start ASA, statin. Consult Neuro    2) Hx of Carotid stenosis/PVD: will repeat carotid dopplers. Start ASA. Cont Statin    3) CAD: hx of multiple stents. Cont ASA, statin. Likely not on BB due to bradycardia    4) Accelerated HTN: increase home norvasc. Add losartan.   Start IV hydralazine prn    Risk of deterioration: high      Total time spent with patient: [address] [place] [address] discussed with: Patient, family, nursing, pharmacy    Discussed:  Care Plan    Prophylaxis:  Lovenox    Probable Disposition:  Home w/Family           ___________________________________________________    Attending Physician: Yu Valles MD

## 2017-02-14 NOTE — IP AVS SNAPSHOT
[address] 
[phone] Patient: Alicja Velásquez MRN: [id number] RXS:[id number] You are allergic to the following No active allergies Recent Documentation Height Weight BMI Smoking Status 1.753 m 65.8 kg 21.41 kg/m2 Current Every Day Smoker Unresulted Labs Order Current Status HEMOGLOBIN A1C WITH EAG In process LIPID PANEL In process Emergency Contacts Name Discharge Info Relation Home Work Mobile Mich Tawny N/A  AT THIS TIME [6] Friend [5] [phone] About your hospitalization You were admitted on:  February 14, 2017 You last received care in the:  [facility] 5M1 MED SURG 1 You were discharged on:  February 15, 2017 Unit phone number:  [phone] Why you were hospitalized Your primary diagnosis was:  Acute Cva (Cerebrovascular Accident) (Hcc) Your diagnoses also included:  History Of Cva (Cerebrovascular Accident), Diplopia, Vomiting, Pvd (Peripheral Vascular Disease) (Hcc), Cad (Coronary Artery Disease), Carotid Stenosis, Accelerated Hypertension, Hyperlipidemia Ldl Goal <70 Providers Seen During Your Hospitalizations Provider Role Specialty Primary office phone Rubina Abebe MD Attending Provider Emergency Medicine [phone] Kinjal Gibbons MD Attending Provider Internal Medicine [phone] Your Primary Care Physician (PCP) Primary Care Physician Office Phone Office Fax Gloria Yogesh [phone] Follow-up Information Follow up With Details Comments Contact Info Tamanna Morris NP Go on 3/2/2017 Neurology hospital follow-up post Stroke:  Arrival time: 0840 for 0900 appt [address] [facility] [address] [place] [address] 
[phone] Janny Garcia MD Schedule an appointment as soon as possible for a visit in 1 week  [address] [facility] [address] [facility] [address] 
[phone] Your Appointments Thursday March 02, 2017  9:00 AM EST Follow Up with Nitesh Draper, NP 6600 [facility] ([address]) [address] [facility] 207 [id number] [facility] [address] [phone] Current Discharge Medication List  
  
START taking these medications Dose & Instructions Dispensing Information Comments Morning Noon Evening Bedtime  
 aspirin 81 mg chewable tablet Your next dose is: Today, Tomorrow Other:  _________ Dose:  81 mg Take 1 Tab by mouth daily. Quantity:  30 Tab Refills:  1 HYDROcodone-acetaminophen 5-325 mg per tablet Commonly known as:  Josephine Moises Your next dose is: Today, Tomorrow Other:  _________ Dose:  1 Tab Take 1 Tab by mouth every four (4) hours as needed. Max Daily Amount: 6 Tabs. Quantity:  15 Tab Refills:  0  
     
   
   
   
  
 labetalol 100 mg tablet Commonly known as:  Melissa Bump Your next dose is: Today, Tomorrow Other:  _________ Dose:  100 mg Take 1 Tab by mouth every twelve (12) hours. Quantity:  60 Tab Refills:  1  
     
   
   
   
  
 losartan 25 mg tablet Commonly known as:  COZAAR Your next dose is: Today, Tomorrow Other:  _________ Dose:  25 mg Take 1 Tab by mouth daily. Quantity:  30 Tab Refills:  1 CONTINUE these medications which have CHANGED Dose & Instructions Dispensing Information Comments Morning Noon Evening Bedtime  
 amLODIPine 10 mg tablet Commonly known as:  Ethan Mcneal What changed:   
- medication strength 
- how much to take Your next dose is: Today, Tomorrow Other:  _________ Dose:  10 mg Take 1 Tab by mouth daily. Quantity:  30 Tab Refills:  1  
     
   
   
   
  
 atorvastatin 80 mg tablet Commonly known as:  LIPITOR What changed:   
- medication strength 
- how much to take - when to take this Your next dose is: Today, Tomorrow Other:  _________ Dose:  80 mg Take 1 Tab by mouth nightly. Quantity:  30 Tab Refills:  1 CONTINUE these medications which have NOT CHANGED Dose & Instructions Dispensing Information Comments Morning Noon Evening Bedtime  
 dextromethorphan-guaiFENesin  mg/5 mL syrup Commonly known as:  ROBITUSSIN-DM Your next dose is: Today, Tomorrow Other:  _________ Dose:  10 mL Take 10 mL by mouth every four (4) hours as needed for Cough. Refills:  0  
     
   
   
   
  
 diclofenac EC 75 mg EC tablet Commonly known as:  VOLTAREN Your next dose is: Today, Tomorrow Other:  _________ Dose:  75 mg Take 75 mg by mouth two (2) times daily as needed. Refills:  0  
     
   
   
   
  
 methocarbamol 500 mg tablet Commonly known as:  ROBAXIN Your next dose is: Today, Tomorrow Other:  _________ Dose:  500 mg Take 500 mg by mouth nightly as needed (muscle pain). Refills:  0 Where to Get Your Medications Information on where to get these meds will be given to you by the nurse or doctor. ! Ask your nurse or doctor about these medications  
  amLODIPine 10 mg tablet  
 aspirin 81 mg chewable tablet  
 atorvastatin 80 mg tablet HYDROcodone-acetaminophen 5-325 mg per tablet  
 labetalol 100 mg tablet  
 losartan 25 mg tablet Discharge Instructions HOSPITALIST DISCHARGE INSTRUCTIONS 
NAME: Cassi Edwards :  1955 MRN:  [id number] Date/Time:  2/15/2017 12:06 PM 
 
ADMIT DATE: 2017 DISCHARGE DATE: 2/15/2017 ADMITTING DIAGNOSIS: 
Double vision DISCHARGE DIAGNOSIS: 
stroke MEDICATIONS: 
See after visit summary · It is important that you take the medication exactly as they are prescribed. · Keep your medication in the bottles provided by the pharmacist and keep a list of the medication names, dosages, and times to be taken in your wallet. · Do not take other medications without consulting your doctor Pain Management: per above medications What to do at [facility] Recommended diet:  Low fat, Low cholesterol Recommended activity: Activity as tolerated 1) Return to the hospital if you feel worse 2) If you experience any of the following symptoms then please call your primary care physician or return to the emergency room if you cannot get hold of your doctor: 
Fever, chills, nausea, vomiting, diarrhea, change in mentation, falling, bleeding, shortness of breath, chest pain, severe headache, severe abdominal pain, 3) You had a stroke from high blood pressure, high cholesterol, smoking. By treating some of these issues, you risks of having another stroke will decrease Follow Up: Follow-up Information Follow up With Details Comments Contact Info Dottie Sow NP Go on 3/2/2017 Neurology hospital follow-up post Stroke:  Arrival time: 0840 for 0900 appt [address] [place] [address] [address] [place] [address] Road [address] [phone] Yonatan Anders MD Schedule an appointment as soon as possible for a visit in 1 week  [address] [facility] [id number] [place] [address] Road [address] [phone] Information obtained by : 
I understand that if any problems occur once I am at home I am to contact my physician. I understand and acknowledge receipt of the instructions indicated above. Physician's or R.N.'s Signature                                                                  Date/Time Patient or Representative Signature                                                          Date/Time Learning About an Ischemic Stroke What is an ischemic stroke? An ischemic (say \"Nimo\") stroke occurs when a blood clot blocks a blood vessel in the brain. This means that blood cannot flow to some part of the brain. Without blood and the oxygen it carries, this part of the brain starts to die. The part of the body controlled by the damaged area of the brain cannot work properly. This is different from a hemorrhagic (say \"erk-dhe-JY-mara\") stroke, which happens when a blood vessel in the brain has burst open or has started to leak. The brain damage from a stroke starts within minutes. Quick treatment can help limit damage to the brain and make recovery more likely. People who have had a stroke may have a hard time talking, understanding things, and making decisions. They may have to relearn daily activities, such as how to eat, bathe, and dress. How well someone recovers from a stroke depends on how quickly the person gets to the hospital, where in the brain the stroke happened, and how severe it was. Training and therapy also make a difference in how well people recover. What are the symptoms? If you have any of these symptoms, call 911 or other emergency services right away: 
· You have symptoms of a stroke. These may include: 
¨ Sudden numbness, tingling, weakness, or loss of movement in your face, arm, or leg, especially on only one side of your body. ¨ Sudden vision changes. ¨ Sudden trouble speaking. ¨ Sudden confusion or trouble understanding simple statements. ¨ Sudden problems with walking or balance. ¨ A sudden, severe headache that is different from past headaches.  
See your doctor if you have symptoms that seem like a stroke, even if they go away quickly. You may have had a transient ischemic attack (TIA), sometimes called a mini-stroke. A TIA is a warning that a stroke may happen soon. Getting early treatment for a TIA can help prevent a stroke. What causes an ischemic stroke? An ischemic stroke is caused by a blood clot that blocks blood flow in the brain. The most common causes of blood clots include: 
· Hardening of the arteries (atherosclerosis). This is caused by high blood pressure, diabetes, high cholesterol, or smoking. · Atrial fibrillation. How is ischemic stroke treated? You may have to take several medicines, depending on what caused your stroke. Ask your doctor if a stroke rehab program is right for you. Rehab increases your chances of getting back some of the abilities you lost. 
Follow-up care is a key part of your treatment and safety. Be sure to make and go to all appointments, and call your doctor if you are having problems. It's also a good idea to know your test results and keep a list of the medicines you take. How can you prevent another stroke? · Work with your doctor to treat any health problems you have. High blood pressure, high cholesterol, atrial fibrillation, and diabetes all raise your chances of having a stroke. · Be safe with medicines. Take your medicine exactly as prescribed. Call your doctor if you think you are having a problem with your medicine. · Have a healthy lifestyle. ¨ Do not smoke or allow others to smoke around you. If you need help quitting, talk to your doctor about stop-smoking programs and medicines. These can increase your chances of quitting for good. Smoking makes a stroke more likely. ¨ Limit alcohol to 2 drinks a day for men and 1 drink a day for women. ¨ Lose weight if you need to. A healthy weight will help you keep your heart and body healthy. ¨ Be active. Ask your doctor what type and level of activity is safe for you. ¨ Eat heart-healthy foods, like fruits, vegetables, and high-fiber foods. Where can you learn more? Go to http://trent-heidi.info/. Enter D161 in the search box to learn more about \"Learning About an Ischemic Stroke. \" Current as of: June 4, 2016 Content Version: 11.1 © 9360-0528 [employer]. Care instructions adapted under license by [employer] (which disclaims liability or warranty for this information). If you have questions about a medical condition or this instruction, always ask your healthcare professional. [place] [address] any warranty or liability for your use of this information. Discharge Orders None Indix Announcement We are excited to announce that we are making your provider's discharge notes available to you in Indix. You will see these notes when they are completed and signed by the physician that discharged you from your recent hospital stay. If you have any questions or concerns about any information you see in Indix, please call the Health Information Department where you were seen or reach out to your Primary Care Provider for more information about your plan of care. Introducing [facility]! Rc Garza introduces Indix patient portal. Now you can access parts of your medical record, email your doctor's office, and request medication refills online. 1. In your internet browser, go to [email or web address] 2. Click on the First Time User? Click Here link in the Sign In box. You will see the New Member Sign Up page. 3. Enter your Indix Access Code exactly as it appears below. You will not need to use this code after youve completed the sign-up process. If you do not sign up before the expiration date, you must request a new code. · Indix Access Code: [email or web address] Expires: 5/15/2017  9:22 AM 
 
 4. Enter the last four digits of your Social Security Number (xxxx) and Date of Birth (mm/dd/yyyy) as indicated and click Submit. You will be taken to the next sign-up page. 5. Create a SourceTour ID. This will be your SourceTour login ID and cannot be changed, so think of one that is secure and easy to remember. 6. Create a SourceTour password. You can change your password at any time. 7. Enter your Password Reset Question and Answer. This can be used at a later time if you forget your password. 8. Enter your e-mail address. You will receive e-mail notification when new information is available in [address] 9. Click Sign Up. You can now view and download portions of your medical record. 10. Click the Download Summary menu link to download a portable copy of your medical information. If you have questions, please visit the Frequently Asked Questions section of the SourceTour website. Remember, SourceTour is NOT to be used for urgent needs. For medical emergencies, dial 911. Now available from your iPhone and Android! General Information Please provide this summary of care documentation to your next provider. Patient Signature:  ____________________________________________________________ Date:  ____________________________________________________________  
  
Jesus Alberto Cochran Provider Signature:  ____________________________________________________________ Date:  ____________________________________________________________

## 2017-02-15 VITALS
TEMPERATURE: 97.9 F | WEIGHT: 145 LBS | HEART RATE: 64 BPM | BODY MASS INDEX: 21.48 KG/M2 | HEIGHT: 69 IN | OXYGEN SATURATION: 94 % | DIASTOLIC BLOOD PRESSURE: 61 MMHG | SYSTOLIC BLOOD PRESSURE: 135 MMHG | RESPIRATION RATE: 20 BRPM

## 2017-02-15 PROBLEM — I63.9 ACUTE CVA (CEREBROVASCULAR ACCIDENT) (HCC): Status: ACTIVE | Noted: 2017-02-15

## 2017-02-15 LAB
ALBUMIN SERPL BCP-MCNC: 3 G/DL (ref 3.5–5)
ALBUMIN/GLOB SERPL: 0.9 {RATIO} (ref 1.1–2.2)
ALP SERPL-CCNC: 97 U/L (ref 45–117)
ALT SERPL-CCNC: 17 U/L (ref 12–78)
ANION GAP BLD CALC-SCNC: 8 MMOL/L (ref 5–15)
AST SERPL W P-5'-P-CCNC: 14 U/L (ref 15–37)
BILIRUB DIRECT SERPL-MCNC: 0.1 MG/DL (ref 0–0.2)
BILIRUB SERPL-MCNC: 0.5 MG/DL (ref 0.2–1)
BUN SERPL-MCNC: 17 MG/DL (ref 6–20)
BUN/CREAT SERPL: 14 (ref 12–20)
CALCIUM SERPL-MCNC: 8.1 MG/DL (ref 8.5–10.1)
CHLORIDE SERPL-SCNC: 105 MMOL/L (ref 97–108)
CHOLEST SERPL-MCNC: 178 MG/DL
CO2 SERPL-SCNC: 26 MMOL/L (ref 21–32)
CREAT SERPL-MCNC: 1.18 MG/DL (ref 0.7–1.3)
ERYTHROCYTE [DISTWIDTH] IN BLOOD BY AUTOMATED COUNT: 13.8 % (ref 11.5–14.5)
EST. AVERAGE GLUCOSE BLD GHB EST-MCNC: 123 MG/DL
GLOBULIN SER CALC-MCNC: 3.4 G/DL (ref 2–4)
GLUCOSE SERPL-MCNC: 115 MG/DL (ref 65–100)
HBA1C MFR BLD: 5.9 % (ref 4.2–6.3)
HCT VFR BLD AUTO: 41.9 % (ref 36.6–50.3)
HDLC SERPL-MCNC: 49 MG/DL
HDLC SERPL: 3.6 {RATIO} (ref 0–5)
HGB BLD-MCNC: 13.5 G/DL (ref 12.1–17)
LDLC SERPL CALC-MCNC: 94 MG/DL (ref 0–100)
LIPID PROFILE,FLP: ABNORMAL
MAGNESIUM SERPL-MCNC: 1.9 MG/DL (ref 1.6–2.4)
MCH RBC QN AUTO: 29.4 PG (ref 26–34)
MCHC RBC AUTO-ENTMCNC: 32.2 G/DL (ref 30–36.5)
MCV RBC AUTO: 91.3 FL (ref 80–99)
PHOSPHATE SERPL-MCNC: 4.1 MG/DL (ref 2.6–4.7)
PLATELET # BLD AUTO: 211 K/UL (ref 150–400)
POTASSIUM SERPL-SCNC: 3.8 MMOL/L (ref 3.5–5.1)
PROT SERPL-MCNC: 6.4 G/DL (ref 6.4–8.2)
RBC # BLD AUTO: 4.59 M/UL (ref 4.1–5.7)
SODIUM SERPL-SCNC: 139 MMOL/L (ref 136–145)
TRIGL SERPL-MCNC: 175 MG/DL (ref ?–150)
VLDLC SERPL CALC-MCNC: 35 MG/DL
WBC # BLD AUTO: 9.5 K/UL (ref 4.1–11.1)

## 2017-02-15 PROCEDURE — 84100 ASSAY OF PHOSPHORUS: CPT | Performed by: INTERNAL MEDICINE

## 2017-02-15 PROCEDURE — 80076 HEPATIC FUNCTION PANEL: CPT | Performed by: INTERNAL MEDICINE

## 2017-02-15 PROCEDURE — 80048 BASIC METABOLIC PNL TOTAL CA: CPT | Performed by: INTERNAL MEDICINE

## 2017-02-15 PROCEDURE — 97535 SELF CARE MNGMENT TRAINING: CPT

## 2017-02-15 PROCEDURE — 97116 GAIT TRAINING THERAPY: CPT

## 2017-02-15 PROCEDURE — 36415 COLL VENOUS BLD VENIPUNCTURE: CPT | Performed by: INTERNAL MEDICINE

## 2017-02-15 PROCEDURE — 74011250637 HC RX REV CODE- 250/637: Performed by: INTERNAL MEDICINE

## 2017-02-15 PROCEDURE — 97162 PT EVAL MOD COMPLEX 30 MIN: CPT

## 2017-02-15 PROCEDURE — 83735 ASSAY OF MAGNESIUM: CPT | Performed by: INTERNAL MEDICINE

## 2017-02-15 PROCEDURE — 85027 COMPLETE CBC AUTOMATED: CPT | Performed by: INTERNAL MEDICINE

## 2017-02-15 PROCEDURE — 97166 OT EVAL MOD COMPLEX 45 MIN: CPT

## 2017-02-15 PROCEDURE — 80061 LIPID PANEL: CPT | Performed by: INTERNAL MEDICINE

## 2017-02-15 PROCEDURE — 83721 ASSAY OF BLOOD LIPOPROTEIN: CPT | Performed by: NURSE PRACTITIONER

## 2017-02-15 PROCEDURE — 83036 HEMOGLOBIN GLYCOSYLATED A1C: CPT | Performed by: INTERNAL MEDICINE

## 2017-02-15 RX ORDER — CLOPIDOGREL BISULFATE 75 MG/1
75 TABLET ORAL DAILY
Status: DISCONTINUED | OUTPATIENT
Start: 2017-02-15 | End: 2017-02-15

## 2017-02-15 RX ORDER — ONDANSETRON 4 MG/1
4 TABLET, ORALLY DISINTEGRATING ORAL
Qty: 20 TAB | Refills: 0 | Status: ON HOLD | OUTPATIENT
Start: 2017-02-15 | End: 2018-11-17

## 2017-02-15 RX ORDER — LABETALOL 100 MG/1
100 TABLET, FILM COATED ORAL EVERY 12 HOURS
Status: DISCONTINUED | OUTPATIENT
Start: 2017-02-15 | End: 2017-02-15 | Stop reason: HOSPADM

## 2017-02-15 RX ORDER — GUAIFENESIN 100 MG/5ML
81 LIQUID (ML) ORAL DAILY
Status: DISCONTINUED | OUTPATIENT
Start: 2017-02-15 | End: 2017-02-15 | Stop reason: HOSPADM

## 2017-02-15 RX ORDER — AMLODIPINE BESYLATE 10 MG/1
10 TABLET ORAL DAILY
Qty: 30 TAB | Refills: 1 | Status: SHIPPED | OUTPATIENT
Start: 2017-02-15

## 2017-02-15 RX ORDER — ATORVASTATIN CALCIUM 20 MG/1
80 TABLET, FILM COATED ORAL
Status: DISCONTINUED | OUTPATIENT
Start: 2017-02-15 | End: 2017-02-15 | Stop reason: HOSPADM

## 2017-02-15 RX ORDER — LOSARTAN POTASSIUM 25 MG/1
25 TABLET ORAL DAILY
Qty: 30 TAB | Refills: 1 | Status: ON HOLD | OUTPATIENT
Start: 2017-02-15 | End: 2018-11-17

## 2017-02-15 RX ORDER — HYDROCODONE BITARTRATE AND ACETAMINOPHEN 5; 325 MG/1; MG/1
1 TABLET ORAL
Qty: 15 TAB | Refills: 0 | Status: ON HOLD | OUTPATIENT
Start: 2017-02-15 | End: 2018-11-17

## 2017-02-15 RX ORDER — ATORVASTATIN CALCIUM 80 MG/1
80 TABLET, FILM COATED ORAL
Qty: 30 TAB | Refills: 1 | Status: ON HOLD | OUTPATIENT
Start: 2017-02-15 | End: 2018-11-17

## 2017-02-15 RX ORDER — GUAIFENESIN 100 MG/5ML
81 LIQUID (ML) ORAL DAILY
Qty: 30 TAB | Refills: 1 | Status: ON HOLD | OUTPATIENT
Start: 2017-02-15 | End: 2018-11-17

## 2017-02-15 RX ORDER — LABETALOL 100 MG/1
100 TABLET, FILM COATED ORAL EVERY 12 HOURS
Qty: 60 TAB | Refills: 1 | Status: ON HOLD | OUTPATIENT
Start: 2017-02-15 | End: 2018-11-17

## 2017-02-15 RX ADMIN — DOCUSATE SODIUM 100 MG: 100 CAPSULE ORAL at 08:14

## 2017-02-15 RX ADMIN — LABETALOL HCL 100 MG: 100 TABLET, FILM COATED ORAL at 08:14

## 2017-02-15 RX ADMIN — Medication 10 ML: at 06:49

## 2017-02-15 RX ADMIN — ASPIRIN 81 MG CHEWABLE TABLET 81 MG: 81 TABLET CHEWABLE at 08:14

## 2017-02-15 RX ADMIN — LOSARTAN POTASSIUM 25 MG: 50 TABLET, FILM COATED ORAL at 08:14

## 2017-02-15 RX ADMIN — AMLODIPINE BESYLATE 10 MG: 5 TABLET ORAL at 08:14

## 2017-02-15 NOTE — DISCHARGE SUMMARY
[facility] 79  566 [facility], [address] [place] [address] Nw  [phone]    Physician Discharge Summary     Patient ID:  Aurelia Mota  [id number]  57 y.o.  1955    Admit date: 2/14/2017    Discharge date and time: 2/15/2017    Admission Diagnoses: Diplopia    Discharge Diagnoses:  Principal Diagnosis Acute CVA (cerebrovascular accident) [facility])                                            Principal Problem:    Acute CVA (cerebrovascular accident) ([address] (2/15/2017)      Overview: Per clinical exam of diplopia and nystagmus    Active Problems:    Carotid stenosis (4/10/2015)      History of CVA (cerebrovascular accident) (2/14/2017)      Overview: CT head: chronic L BG infarct      PVD (peripheral vascular disease) (HCC) ()      Hyperlipidemia LDL goal <70 ()      CAD (coronary artery disease) ()      Overview: MI 1995      Diplopia (2/14/2017)      Vomiting (2/14/2017)      Overview: PTA, occurred this AM      Accelerated hypertension (2/14/2017)           Hospital Course:     56 yo hx of HTN, CAD, PVD, presented w/ binocular diplopia     1) Acute CVA/Binocular Diplopia: Neuro diagnosed as bilateral internuclear ophthalmoparesis, likely from a small vessel CVA. Multiple risk factors for CVA. Head CT showed an old L basal ganglia infarct. Head MRA/MRI neg (by radiology), but neurology thinks there a lesion in the pontine tegmentum. Carotid dopplers wnl. . Patient was educated about stroke. Started ASA. lipitor increased to 80mg daily. Will f/u with Neurology     2) Hx of Carotid stenosis/PVD: carotid dopplers nwl. Cont ASA, Statin     3) CAD: hx of multiple stents. Cont ASA, statin. Likely not on BB due to bradycardia     4) Accelerated HTN: increased home norvasc.  Added losartan and oral labetalol    PCP: Jody Pace MD     Consults: Neurology    Significant Diagnostic Studies: head MRI    Discharge Exam:  Physical Exam:    Gen: Well-developed, well-nourished, in no acute distress  HEENT:  Pink conjunctivae, PERRL, hearing intact to voice, moist mucous membranes  Neck: Supple, without masses, thyroid non-tender  Resp: No accessory muscle use, clear breath sounds without wheezes rales or rhonchi  Card: No murmurs, normal S1, S2 without thrills, bruits or peripheral edema  Abd:  Soft, non-tender, non-distended, normoactive bowel sounds are present, no palpable organomegaly and no detectable hernias  Lymph:  No cervical or inguinal adenopathy  Musc: No cyanosis or clubbing  Skin: No rashes or ulcers, skin turgor is good  Neuro:  Cranial nerves are grossly intact, no focal motor weakness, follows commands appropriately  Psych:  Good insight, oriented to person, place and time, alert    Disposition: home  Discharge Condition: Stable    Patient Instructions:   Current Discharge Medication List      START taking these medications    Details   aspirin 81 mg chewable tablet Take 1 Tab by mouth daily. Qty: 30 Tab, Refills: 1      HYDROcodone-acetaminophen (NORCO) 5-325 mg per tablet Take 1 Tab by mouth every four (4) hours as needed. Max Daily Amount: 6 Tabs. Qty: 15 Tab, Refills: 0      labetalol (NORMODYNE) 100 mg tablet Take 1 Tab by mouth every twelve (12) hours. Qty: 60 Tab, Refills: 1      losartan (COZAAR) 25 mg tablet Take 1 Tab by mouth daily. Qty: 30 Tab, Refills: 1         CONTINUE these medications which have CHANGED    Details   amLODIPine (NORVASC) 10 mg tablet Take 1 Tab by mouth daily. Qty: 30 Tab, Refills: 1      atorvastatin (LIPITOR) 80 mg tablet Take 1 Tab by mouth nightly. Qty: 30 Tab, Refills: 1         CONTINUE these medications which have NOT CHANGED    Details   diclofenac EC (VOLTAREN) 75 mg EC tablet Take 75 mg by mouth two (2) times daily as needed. methocarbamol (ROBAXIN) 500 mg tablet Take 500 mg by mouth nightly as needed (muscle pain).       dextromethorphan-guaiFENesin (ROBITUSSIN-DM)  mg/5 mL syrup Take 10 mL by mouth every four (4) hours as needed for Cough.            Activity: Activity as tolerated  Diet: Low fat, Low cholesterol  Wound Care: None needed    Follow-up with  Follow-up Information     Follow up With Details Comments Contact Info    Gm Greer NP Go on 3/2/2017 Neurology hospital follow-up post Stroke:  Arrival time: 0840 for [address]t [address]  [address]      Mariposa Mejias MD Schedule an appointment as soon as possible for a visit in 1 week  [address] [place] [address]  [phone]            Follow-up tests/labs: per Neurology    Signed:  Yuliana Bloom MD  2/15/2017  12:07 PM    I spent 35 min on discharge

## 2017-02-15 NOTE — PROGRESS NOTES
Discharged home with girlfriend. He stated that he understood discharge instructions and prescriptions. To be wheeled out to car in w/c by volunteer.

## 2017-02-15 NOTE — CONSULTS
Terrell Hendersonelsen misael [place] [address]   [address] [facility], [address]   [address] [facility]       Name:  Shea Pino   MR#:  [id number]   :  1955   Account #:  [de-identified]    Date of Consultation:  2017   Date of Adm:  2017       REASON FOR CONSULTATION: At the request of the staff for   evaluation of new onset double vision. HISTORY OF PRESENT ILLNESS: The patient is a pleasant 58-year-  old white male,  individual. As I understand it, he has   background history of hypertension and hyperlipidemia, history of   coronary artery disease with stenting, history of peripheral vascular   disease with stenting, and a chronic smoker. According to admission   note, his initial blood pressure was 255/176. The nurse just told me at   the bedside that his systolic reading is 089. The patient says he went to bed last night in his usual state of health   and got up around 2:30 earlier today and had to go to the bathroom. He noticed upon awakening that he was seeing new onset of vertical   diplopia but resolved with independent eye cover. Would come to   notice that he had difficulty with balance and he had some coincident   nausea as well. He described a posterior head type of pressure   and at one point, a little bit of discomfort around his left eye and   forehead area. The patient's head CT on admission showed old left   basal ganglia infarct, but nothing new. He has never had this   experience before. Does not take aspirin at home. MEDICATIONS: Claims compliance with his medicine which includes    1. Norvasc. 2. He is also on Voltaren. 3. Robaxin. 4. He is on Lipitor for his lipids. 5. He is on Robitussin. ALLERGIES: HE HAS NO KNOWN ALLERGIES. PAST SURGICAL HISTORY: He has history of a Mohs procedure on   the right, left rotator cuff manipulation.  He ended up with what he calls   a peripheral neuropathy in the left leg following vascular stents. SOCIAL HISTORY: Current every day smoker, quarter pack per day. He has been encouraged to stop. Alcohol ingestion is about 24 cans of   beer per week. REVIEW OF SYSTEMS   Negative or per history of present illness. FAMILY HISTORY: Positive for heart disease in the brother. LABORATORY DATA: Testing since here includes a white blood count   of 14.7 with slightly increased neutros. His random blood sugar 121. His alkaline phosphatase is just mildly increased. His CT scan as   mentioned. The MRI scan of the brain with and without contrast read   out as showing nothing acute, chronic left basal ganglia lacunar infarct,   mild white matter changes due to established microvascular disease. The MRA of the brain was read as completely normal. The   MRV of the brain was read out as normal as well. Echocardiogram   today, ejection fraction of 55% to 60%. Wall thickness left ventricle   mildly increased, left atrium dilated. No intracardiac shunt was   detected with agitated saline. The rest of the echo features were   unrevealing. PHYSICAL EXAMINATION   GENERAL: Pleasant-appearing, middle-aged white male. He is alert. He is cooperative. He is fluent, articulate, upbeat, following 3-step   commands. VITAL SIGNS: Temperature 98.1, pulse 82, blood pressure 196/81,   respirations 15, pulse oximetry 96% on room air. HEENT: Normocephalic, atraumatic without bruits. Ears, nose and   throat were clear. NECK: Reasonably supple. No lymphadenopathy. Carotid upstroke. Nontender, no bruits. Range of motion complete. CHEST: Clear. No rales or wheezes. CARDIOVASCULAR: Currently regular rate and rhythm without   gallops, murmurs or rubs. Pulses 2+ and full. ABDOMEN: Rounded, nontender. EXTREMITIES: Full range of motion without cyanosis, clubbing,   edema, rash, or birthmarks. No involuntary movements seen. NEUROLOGIC: Speech is fluent, articulate.  Oral exam showed normal   motor mechanics of tongue and palate. Facial animation and sensibility   intact. The patient's external oral appearance otherwise unrevealing. He has no manifest ptosis. In primary gaze, he has a little bit of upbeat   nystagmus that is enhanced rather remarkably with looking up. On   downgaze appears to be full and he has maybe a trace of upbeat   nystagmus. In gaze right, he has a fast horizonal jerk nystagmus and   on gaze left, the horizontal jerk nystagmus is less noticeable. Extraocular motility and primary gaze cover uncover testing, he has a   slight corrected esophoria. He has what appears to be a bilateral   internuclear ophthalmoparesis. It is much more apparent on the right   side than the left. He has a right eye abducting nystagmus on gaze   right and he has less noticeable nystagmus in the left eye on left gaze. He does appear to be able to converge at this time. His pupils appear   to be equally round and reactive to light. Afferent pupillary defect   negative. Funduscopic grossly unrevealing under nondilated   conditions. You can see the retina with the vertical upbeat nystagmus   qualities on straight forward gaze. The patient's cerebellar   testing, finger-nose-finger, toe-to-finger on target. Motor exam is   nonfocal at this time. Sensibility below the chin grossly intact except for   the left leg where he has diminished appreciation to touch,   temperature, and vibratory moderately and in the right leg, subtle to   mild distally. The reflexes are trace to +1 above the waist. They are   approaching +1 at the knees, and the ankle jerks are absent. The toes   are currently downgoing. There is no clonus. No Nathaniel. Gait, etc.,   deferred. IMPRESSION: New onset of vertical diplopia. PLAN: I would isolate this as a bilateral internuclear ophthalmoparesis   near the ponto-mesencephalic junction. No doubt the vessels with the basilar   artery midline perforators, so small vessel disease would be implied.  In this gentleman, that certainly makes sense given his cardiac and   peripheral vascular disease history as I know it. Would suggest that   because this is inferred small vessel disease, that the blood pressure   be maintained within stringent boundaries, say in the 464-056 range   systolic. He is on aspirin. He is on his statin agent. I wrote for neuro   checks and fall precautions. See therapy consults planned as well. This will  Be of vital importance to safely and efficiently transitioning to a realistic OP ADL operations. I   warned him that this vertical diplopia could possibly slowly improve, but   could not give him a guaranteed time frame as to how long it would   take or indeed, if it would be an absolute improvement, etc. His   improvement in preventing future events would go to management of   vascular risk factors, which have already been enumerated above. I   cannot think of anything else to suggest at this time, and I do   appreciate the chance to see this gentleman. He may benefit from a   monocular patch on either the right or left eye to eliminate the diplopia   aspect of his chief complaint. Addendum: I looked at the MRI for the critical area  Of concern, and believe he has a lesion in the pontine tegmentum.         Toshia Swanson MD      [facility]   D:  02/14/2017   17:39   T:  02/14/2017   20:52   Job #:  [id number]

## 2017-02-15 NOTE — PROGRESS NOTES
Problem: Ischemic Stroke: Day 2  Goal: *Optimal pain control at patients stated goal  Outcome: Progressing Towards Goal  Denies at this time.

## 2017-02-15 NOTE — PROGRESS NOTES
Problem: Mobility Impaired (Adult and Pediatric)  Goal: *Acute Goals and Plan of Care (Insert Text)  PHYSICAL THERAPY EVALUATION/DISCHARGE  Patient: Yrn Stephenson (19 y.o. male)  Date: 2/15/2017  Primary Diagnosis: Diplopia        Precautions: Universal     ASSESSMENT :  Based on the objective data described below, the patient presents with a diagnosis of acute CVA/Binocular Diplopia: Neuro diagnosed as bilateral internuclear ophthalmoparesis, likely from a small vessel CVA. Multiple risk factors for CVA. Head CT showed an old L basal ganglia infarct. Head MRA/MRI neg (by radiology), but neurology thinks there a lesion in the pontine tegmentum. Patient reports feeling nauseous, states his vision has improved some since yesterday, but still with double vision when he looks up. Bilateral lower and upper extremities 5/5 strength except left knee flex/ext are 4/5. No drift. Performed the CLAY balance assessment (without eye patch) and the Functional Gait Assessment with the eye patch. He has good standing static and dynamic balance but some unsteadiness with dynamic gait. He would benefit from continued therapy with a Physical Therapist who specializes in visual disturbances or vestibular/balance impairments. He has baseline left lower extremity neuropathy which complicates his presentation. Skilled physical therapy is not indicated at this time. PLAN :  Discharge Recommendations: Home Health vs Outpatient (will depend on ride availability)  Further Equipment Recommendations for Discharge: none       SUBJECTIVE:   Patient stated Now I'm nauseous after walking.       OBJECTIVE DATA SUMMARY:   HISTORY:    Past Medical History   Diagnosis Date    Acute CVA (cerebrovascular accident) ([address] 2/15/2017       Per clinical exam    Acute CVA (cerebrovascular accident) ([address] 2/15/2017    CAD (coronary artery disease)         MI 1995    Carotid artery disease (HCC)      Cough      Diplopia 2/14/2017  Fatigue      Headache      History of CVA (cerebrovascular accident) 2/14/2017       CT head: chronic L BG infarct    Hyperlipidemia      Hypertension      Muscle weakness      PVD (peripheral vascular disease) [facility]       Past Surgical History   Procedure Laterality Date    Hx mohs procedure Right 1/2015    Hx orthopaedic Left 4/2015       rotator cuff manipulation    Pr cardiac surg procedure unlist           4 cardiac stents, 3 stens to L leg, 2 stens R leg, 1 stent to abdomen    Pr neurological procedure unlisted           EMG     Prior Level of Function/Home Situation: lives alone, girlfriend will assist with driving - only available in afternoon, one fall in past year (immed prior to this admission)  Personal factors and/or comorbidities impacting plan of care: trying to quit smoking, left leg neuropathy, decreased left cervical rotation     Home Situation  Home Environment: Private residence  One/Two Story Residence: One story  Living Alone: Yes  Support Systems: Family member(s)  Patient Expects to be Discharged to[de-identified] Private residence  Current DME Used/Available at Home: None     EXAMINATION/PRESENTATION/DECISION MAKING:   Critical Behavior:  Neurologic State: Alert, Appropriate for age, Eyes open spontaneously  Orientation Level: Oriented X4  Cognition: Appropriate decision making, Appropriate for age attention/concentration, Appropriate safety awareness, Follows commands        Strength: Tone & Sensation:                                 Range Of Motion:                          Coordination:        Functional Mobility:  Bed Mobility:     Supine to Sit: Independent        Transfers:  Sit to Stand: Supervision; Independent  Stand to Sit: Independent                       Balance:   Sitting: Intact  Standing: Intact  Ambulation/Gait Training:  Distance (ft): 200 Feet (ft)  Assistive Device: Gait belt  Ambulation - Level of Assistance: Supervision Stairs:                                           Functional Measure:  Functional Gait Assessment:      Gait Level Surface: Normal  Change In Gait Speed: Normal  Gait With Horizontal Head Turns: Normal  Gait With Vertical Head Turns: Normal  Gait And Pivot Turn: Normal  Step Over Obstacle: Moderate impairment  Gait With Narrow Base Of Support: Severe impairment  Gait With Eyes Closed: Severe impairment  Ambulating Backwards: Normal  Steps: Mild impairment  Score: 21      Functional Gait Assessment and G-code impairment scale:  Percentage of Impairment CH     0%    CI     1-19% CJ     20-39% CK     40-59% CL     60-79% CM     80-99% CN      100%   Functional Gait  Score 0-30 30 25-29 19-24 13-18 7-12 1-6 0          Maximum Score 30   £ 22/30 classifies fall risk in older adults and predicts unexplained falls in community-dwelling older adults  Age: \"Normal\" score:   Up to 60 >27/30   60-80 >24/30   Over [de-identified] >19/30   MARCELLUS Altman. \"Functional Gait Assessment: concurrent discriminative, and predictive validity in community-dwelling older adults. \" Physical Therapy 90 (5) 2010.       Matias Balance Test:      Sitting to Standin  Standing Unsupported: 4  Sitting with Back Unsupported: 4  Standing to Sittin  Transfers: 4  Standing Unsupported with Eyes Closed: 3  Standing Unsupported with Feet Together: 3  Reach Forward with Outstretched Arm: 4   Object: 4  Turn to Look Over Shoulders: 4  Turn 360 Degrees: 4  Alternate Foot on Step/Stool: 4  Standing Unsupported One Foot in Front: 2  Stand on One Le  Total: 48             56=Maximum possible score;   0-20=High fall risk  21-40=Moderate fall risk   41-56=Low fall risk      Matias Balance Test and G-code impairment scale:  Percentage of Impairment CH     0%    CI     1-19% CJ     20-39% CK     40-59% CL     60-79% CM     80-99% CN      100%   Matias   Score 0-56 56 45-55 34-44 23-33 12-22 1-11 0 G codes: In compliance with CMSs Claims Based Outcome Reporting, the following G-code set was chosen for this patient based on their primary functional limitation being treated: The outcome measure chosen to determine the severity of the functional limitation was the [employer] with a score of 48/56 which was correlated with the impairment scale. · Mobility - Walking and Moving Around:               - CURRENT STATUS:    CJ - 20%-39% impaired, limited or restricted               - GOAL STATUS:                       CJ - 20%-39% impaired, limited or restricted               - D/C STATUS:           CJ - 20%-39% impaired, limited or restricted           Physical Therapy Evaluation Charge Determination   History Examination Presentation Decision-Making   MEDIUM  Complexity : 1-2 comorbidities / personal factors will impact the outcome/ POC  MEDIUM Complexity : 3 Standardized tests and measures addressing body structure, function, activity limitation and / or participation in recreation  MEDIUM Complexity  MEDIUM Complexity : FOTO score of 26-74      Based on the above components, the patient evaluation is determined to be of the following complexity level: MEDIUM      Pain:  Pain Scale 1: Numeric (0 - 10)  Pain Intensity 1: 0     Activity Tolerance:   Nausea with mobiliy  Please refer to the flowsheet for vital signs taken during this treatment. After treatment:   [X]   Patient left in no apparent distress sitting up on side of bed  [ ]   Patient left in no apparent distress in bed  [X]   Call bell left within reach  [X]   Nursing notified  [ ]   Caregiver present  [ ]   Bed alarm activated      COMMUNICATION/EDUCATION:   Communication/Collaboration:  [X]   Fall prevention education was provided and the patient/caregiver indicated understanding. [X]   Patient/family have participated as able and agree with findings and recommendations.   [ ]   Patient is unable to participate in plan of care at this time.   Findings and recommendations were discussed with: Occupational Therapist and Registered Nurse     Thank you for this referral.  Carlos Andrade, PT   Time Calculation: 30 mins

## 2017-02-15 NOTE — PROGRESS NOTES
Bedside shift change report given to Saulo Stroud RN (oncoming nurse) by Saige Ortiz RN (offgoing nurse). Report included the following information SBAR, Kardex and MAR.

## 2017-02-15 NOTE — PROGRESS NOTES
Problem: Ischemic Stroke: Day 2  Goal: *Ability to perform ADLs and demonstrates progressive mobility and function  Outcome: Progressing Towards Goal  Still states that he has moments of dizziness and that his vision is still blurry.

## 2017-02-15 NOTE — PROGRESS NOTES
Letter of Status Determination: Current Status INPATIENT is Appropriate        Pt Name:  Scott Coronado   MR#  [id number]   [facility]#   [id number]   Room and Hospital  5/65  @ [address] date  2/14/2017  9:02 AM   Current Attending Physician  Alfredo Buchanan MD   Principal diagnosis  Acute CVA (cerebrovascular accident) [facility])    Clinicals  57 y.o. y.o  male hospitalized with diplopia. This pt has CT findings suggestive of previous Basal ganglia CVA, and he also suffers from other chronic risk factors. MRI done this admission failed to show new stroke, but clinical presentation and examination confirms that the patient had an acute CVA. This was confirmed with neurologist as well. [place] MCG criteria   Does   apply Acute CVA    STATUS DETERMINATION  On the basis of clinical data, available documentaion, we believe that the current status of this patient as INPATIENT is Appropriate     This case will most likely be challenged by the patient's insurance company due to short duration of stay; we will stand by the decision from the attending physician and if necessary pursue peer to peer review.       Additional comments     Insurance  Payor: Mauro Torres / Plan: [address] [place] [address] HMO / Product Type: Managed Care Medicare /    [address] [place] [address] [address] [facility] HMO Phone:     Subscriber: Ashwini Patel Subscriber#: [id number]    Group#: [id number] Precert#:                  Sobeida Prado MD MPH FACP     Physician Advisor    [address]     [facility], [address] [place] [address]    Cell  [phone]

## 2017-02-15 NOTE — PROGRESS NOTES
Occupational Therapy EVALUATION/discharge  Patient: Rose Roche (56 y.o. male)  Date: 2/15/2017  Primary Diagnosis: Diplopia        Precautions: fall    ASSESSMENT:   Based on the objective data described below, the patient presents with admission for onset binocular diplopia, vertical diplopia accompanied with nausea. Neuro diagnosed as bilateral internuclear ophthalmoparesis,head CT showed chronic L basal ganglia infarct. Head MRA/MRI neg (by radiology), but neurology ruling out possible lesion in the pontine tegmentum. Patient states his vision has improved  since yesterday,increased doubling with looking to the right or upward, decreased when glancing down. Patient has patch, was educated to protocol to wear two hours each eye and switch. Nausea resolved from PT session when OT approached. BUE ROM and strength WDL, no numbness/tingling or strength differential noted. Kirsten Wade scored 66/66, somewhat resistant to testing and focused on discharge. Patient agreeable to follow up in OP, states greatest concern is inability to drive.       PMH of baseline left lower extremity neuropathy, PMH of multiple stents and LUE RTC manipulation. Patient is on disability, states active lifestyle. Patient will have assistance for IADLS on discharge. Skilled acute occupational therapy is completed at this time. Discharge Recommendations: Outpatient  Further Equipment Recommendations for Discharge: none      SUBJECTIVE:   Patient stated I hate that I can't drive with this.     OBJECTIVE DATA SUMMARY:   HISTORY:   Past Medical History   Diagnosis Date    Acute CVA [facility]cerebrovascular accident) ([facility] [address] 2/15/2017     Per clinical exam    Acute CVA (cerebrovascular accident) ([facility] [address] 2/15/2017    CAD (coronary artery disease)      MI 1995    Carotid artery disease (HCC)     Cough     Diplopia 2/14/2017    Fatigue     Headache     History of CVA (cerebrovascular accident) 2/14/2017     CT head: chronic L BG infarct    Hyperlipidemia     Hypertension     Muscle weakness     PVD (peripheral vascular disease) ([facility])      Past Surgical History   Procedure Laterality Date    Hx mohs procedure Right 1/2015    Hx orthopaedic Left 4/2015     rotator cuff manipulation    Pr cardiac surg procedure unlist       4 cardiac stents, 3 stens to L leg, 2 stens R leg, 1 stent to abdomen    Pr neurological procedure unlisted       EMG       Prior Level of Function/Home Situation: I with ADLS, on disability due to cardiac stents  Expanded or extensive additional review of patient history:     Home Situation  Home Environment: Private residence  One/Two Story Residence: One story  Living Alone: Yes  Support Systems: Family member(s)  Patient Expects to be Discharged to[de-identified] Private residence  Current DME Used/Available at Home: None  Tub or Shower Type: Shower  [x]  Right hand dominant   []  Left hand dominant    EXAMINATION OF PERFORMANCE DEFICITS:  Cognitive/Behavioral Status:  Neurologic State: Alert  Orientation Level: Oriented X4  Cognition: Appropriate decision making; Appropriate for age attention/concentration; Appropriate safety awareness  Perception: Appears intact  Perseveration: No perseveration noted  Safety/Judgement: Fall prevention; Insight into deficits  Skin: appears intact UE  Edema: none noted UE  Vision/Perceptual:                      Diplopia: Yes    Acuity: Within Defined Limits;Able to read clock/calendar on wall without difficulty (with single eye vision)       Range of Motion:    AROM: Within functional limits (BUE)                       Strength:    Strength: Within functional limits (BUE)              Coordination:  Coordination: Within functional limits (BUE)  Fine Motor Skills-Upper: Left Intact; Right Intact    Gross Motor Skills-Upper: Left Intact; Right Intact  Tone & Sensation:    Tone: Normal (BUE)  Sensation: Intact (BUE)                      Balance:  Sitting: Intact; Without support  Standing: Intact; Without support    Functional Mobility and Transfers for ADLs:  Bed Mobility:  Rolling: Independent  Supine to Sit: Independent  Scooting: Independent    Transfers:  Sit to Stand: Independent  Stand to Sit: Independent  Bed to Chair: Independent  Toilet Transfer : Independent    ADL Assessment:  Feeding: Independent    Oral Facial Hygiene/Grooming: Independent    Bathing: Independent    Upper Body Dressing: Independent    Lower Body Dressing: Independent    Toileting: Independent                ADL Intervention and task modifications:    Patient instructed and indicated understanding the benefits of maintaining activity tolerance, functional mobility, and independence with self care tasks during acute stay to ensure safe return home and to baseline. Encouraged patient to increase frequency and duration OOB, be out of bed for all meals, perform daily ADLs (as approved by RN/MD regarding bathing etc), and performing functional mobility to/from bathroom with assistance. Cognitive Retraining  Safety/Judgement: Fall prevention; Insight into deficits      Functional Measure:  Estella Fells codes: In compliance with CMSs Claims Based Outcome Reporting, the following G-code set was chosen for this patient based on their primary functional limitation being treated: The outcome measure chosen to determine the severity of the functional limitation was the [facility] with a score of 66/66 which was correlated with the impairment scale. ?  Other PT/OT Primary Functional Limitations:     - CURRENT STATUS: CH - 0% impaired, limited or restricted    - GOAL STATUS: CH - 0% impaired, limited or restricted    - D/C STATUS:  CH - 0% impaired, limited or restricted     Occupational Therapy Evaluation Charge Determination   History Examination Decision-Making   MEDIUM Complexity : Expanded review of history including physical, cognitive and psychosocial  history  MEDIUM Complexity : 3-5 performance deficits relating to physical, cognitive , or psychosocial skils that result in activity limitations and / or participation restrictions MEDIUM Complexity : Patient may present with comorbidities that affect occupational performnce. Miniml to moderate modification of tasks or assistance (eg, physical or verbal ) with assesment(s) is necessary to enable patient to complete evaluation       Based on the above components, the patient evaluation is determined to be of the following complexity level: MEDIUM  Pain:  Pain Scale 1: Numeric (0 - 10)  Pain Intensity 1: 0              Activity Tolerance:   Patient completed multiple sit to stand transfers, mobility around room and bathroom with patch over left eye  in completion of ADLS with no LOB or break  Please refer to the flowsheet for vital signs taken during this treatment. After treatment:   [x]  Patient left in no apparent distress sitting up in chair  []  Patient left in no apparent distress in bed  [x]  Call bell left within reach  [x]  Nursing notified  []  Caregiver present  []  Bed alarm activated    COMMUNICATION/EDUCATION:   Communication/Collaboration:  [x]      Home safety education was provided and the patient/caregiver indicated understanding. [x]      Patient/family have participated as able and agree with findings and recommendations. []      Patient is unable to participate in plan of care at this time.   Findings and recommendations were discussed with: Physical Therapist, Registered Nurse and     Marco A Pitts OT  Time Calculation: 25 mins

## 2017-02-15 NOTE — PROCEDURES
[address]  *** FINAL REPORT ***    Name: Joleen Cassidy  MRN: [id number]  : 22 Aug 1955  HIS Order #: [id number]  [id number] [facility] Visit #: [id number]  Date: 2017    TYPE OF TEST: Cerebrovascular Duplex    REASON FOR TEST  Transient ischemic attacks    Right Carotid:-             Proximal               Mid                 Distal  cm/s  Systolic  Diastolic  Systolic  Diastolic  Systolic  Diastolic  CCA:     85.7       9.0                            70.7      13.7  Bulb:  ECA:    420.1      36.4  ICA:     88.2      23.6      115.6      30.3       68.1      22.8  ICA/CCA:  1.2       1.7    ICA Stenosis: <50%    Right Vertebral:-  Finding: Antegrade  Sys:       83.6  Rosemary:       21.5    Right Subclavian:    Left Carotid:-            Proximal                Mid                 Distal  cm/s  Systolic  Diastolic  Systolic  Diastolic  Systolic  Diastolic  CCA:     26.5       9.5                           171.7      32.3  Bulb:  ECA:     21.3       6.4  ICA:    135.3      36.8       77.6      19.0       98.0      23.7  ICA/CCA:  0.8       1.1    ICA Stenosis: <50%    Left Vertebral:-  Finding: Antegrade  Sys:       91.5  Rosemary:       18.8    Left Subclavian:    INTERPRETATION/FINDINGS  PROCEDURE:  Evaluation of the extracranial cerebrovascular arteries  with ultrasound (B-mode imaging, pulsed Doppler, color Doppler). Includes the common carotid, internal carotid, external carotid, and  vertebral arteries. FINDINGS:  RIGHT: Heterogeneous plaque was seen in the bulb, and proximal and mid   ICA with no hemodynamic changes,. Complex plaqe was seen in the  proximal ECA with hemodynamic changes. LEFT: Homogeneous plaque was noted in the distal cca, bulb, eca, and  proximal ica with no hemodynamic changes. Possible ulcerative plaque  was noted in the distal CCA    IMPRESSION: Findings are consistent with 0-49% stenosis of the right  internal carotid and 0-49% stenosis of the left internal carotid.   Vertebrals are patent with antegrade flow. Elevated velocities were  noted in the right ECA and left subclavian artery. NOTE: Possible  ulcerative plaque was noted in the distal left CCA. ADDITIONAL COMMENTS    I have personally reviewed the data relevant to the interpretation of  this  study. TECHNOLOGIST: [facility]. Андрей  Signed: 02/14/2017 11:39 PM    PHYSICIAN: Quinn Kaur.  Amrita Isbell MD  Signed: 02/15/2017 08:40 AM

## 2017-02-15 NOTE — PROGRESS NOTES
[facility] Neurology  [place] [address]  [phone]     Inpatient Neurology Progress Note  Aly Jasso, [facility]    Name:   Kati Joel record #: [id number]  Admission Date: 2/14/2017  Date:   02/15/17  _____________________________________________________________________________    Subjective:  CC:  I have a little bit of a numb lower L lip since waking up  ·  states he feels better when wearing eye patch but not choosing to wear it now--it makes him feel weird  · BP much improved today  · Didn't follow-up with his PCP in past after starting Amlodipine 5mg and assumed things were okay  · No overnight events  Denies:   HA, dysarthria, vomiting, tremors, aphasia, confusion or weakness  _____________________________________________________________________________  Objective  Patient Vitals for the past 12 hrs:   Temp Pulse Resp BP SpO2   02/15/17 0951 97.9 °F (36.6 °C) 64 20 135/61 94 %   02/15/17 0700 - 68 - - -   02/15/17 0647 97.9 °F (36.6 °C) 65 16 149/75 96 %   02/15/17 0221 97.8 °F (36.6 °C) 65 16 138/62 97 %   02/14/17 2309 - 70 - - -   02/14/17 2254 98.3 °F (36.8 °C) 66 15 144/63 96 %     Allergies:   No Known Allergies  Inpatient Meds    Current Facility-Administered Medications:     labetalol (NORMODYNE) tablet 100 mg, 100 mg, Oral, Q12H, Rosalio LUEVANO Do, MD, 100 mg at 02/15/17 0316    atorvastatin (LIPITOR) tablet 80 mg, 80 mg, Oral, QHS, Aly Jasso, CHARLES    aspirin chewable tablet 81 mg, 81 mg, Oral, DAILY, Aly Coon, NP    losartan (COZAAR) tablet 25 mg, 25 mg, Oral, DAILY, Rosalio LUEVANO Do, MD, 25 mg at 02/15/17 0814    hydrALAZINE (APRESOLINE) 20 mg/mL injection 20 mg, 20 mg, IntraVENous, Q6H PRN, Rosalio LUEVANO Do, MD, 20 mg at 02/14/17 1530    amLODIPine (NORVASC) tablet 10 mg, 10 mg, Oral, DAILY, Rosalio LUEVANO Do, MD, 10 mg at 02/15/17 0814    sodium chloride (NS) flush 5-10 mL, 5-10 mL, IntraVENous, Q8H, Rosalio LUEVANO Do, MD, 10 mL at 02/15/17 0649    sodium chloride (NS) flush 5-10 mL, 5-10 mL, IntraVENous, PRN, Rosalio LUEVANO Do, MD    acetaminophen (TYLENOL) tablet 650 mg, 650 mg, Oral, Q4H PRN, Rosalio LUEVANO Do, MD    HYDROcodone-acetaminophen (NORCO) 5-325 mg per tablet 1 Tab, 1 Tab, Oral, Q4H PRN, Eladia Ellis MD, 1 Tab at 02/14/17 2106    HYDROmorphone (PF) (DILAUDID) injection 1 mg, 1 mg, IntraVENous, Q4H PRN, Rosalio LUEVANO Do, MD    naloxone (NARCAN) injection 0.4 mg, 0.4 mg, IntraVENous, PRN, Rosalio LUEVANO Do, MD    diphenhydrAMINE (BENADRYL) injection 12.5 mg, 12.5 mg, IntraVENous, Q4H PRN, Rosalio LUEVANO Do, MD    ondansetron (ZOFRAN) injection 4 mg, 4 mg, IntraVENous, Q6H PRN, Rosalio LUEVANO Do, MD    docusate sodium (COLACE) capsule 100 mg, 100 mg, Oral, BID, Rosalio LUEVANO Do, MD, 100 mg at 02/15/17 0814    nicotine (NICODERM CQ) 21 mg/24 hr patch 1 Patch, 1 Patch, TransDERmal, Q24H, Rosalio LUEVANO Do, MD, 1 Patch at 02/14/17 1737    enoxaparin (LOVENOX) injection 40 mg, 40 mg, SubCUTAneous, Q24H, Najma Patel NP, 40 mg at 02/14/17 1444    labetalol (NORMODYNE;TRANDATE) injection 20 mg, 20 mg, IntraVENous, Q6H PRN, Rosalio LUEVANO Do, MD, 20 mg at 02/14/17 1736    guaiFENesin-codeine (ROBITUSSIN AC) 100-10 mg/5 mL solution 10 mL, 10 mL, Oral, Q4H PRN, Nabil Tracey MD, 10 mL at 02/14/17 5187  Labs Reviewed  Recent Results (from the past 12 hour(s))   METABOLIC PANEL, BASIC    Collection Time: 02/15/17  5:51 AM   Result Value Ref Range    Sodium 139 136 - 145 mmol/L    Potassium 3.8 3.5 - 5.1 mmol/L    Chloride 105 97 - 108 mmol/L    CO2 26 21 - 32 mmol/L    Anion gap 8 5 - 15 mmol/L    Glucose 115 (H) 65 - 100 mg/dL    BUN 17 6 - 20 MG/DL    Creatinine 1.18 0.70 - 1.30 MG/DL    BUN/Creatinine ratio 14 12 - 20      GFR est AA >60 >60 ml/min/1.73m2    GFR est non-AA >60 >60 ml/min/1.73m2    Calcium 8.1 (L) 8.5 - 10.1 MG/DL   CBC W/O DIFF    Collection Time: 02/15/17  5:51 AM   Result Value Ref Range    WBC 9.5 4.1 - 11.1 K/uL    RBC 4.59 4. 10 - 5.70 M/uL    HGB 13.5 12.1 - 17.0 g/dL    HCT 41.9 36.6 - 50.3 %    MCV 91.3 80.0 - 99.0 FL    MCH 29.4 26.0 - 34.0 PG    MCHC 32.2 30.0 - 36.5 g/dL    RDW 13.8 11.5 - 14.5 %    PLATELET 832 638 - 052 K/uL   MAGNESIUM    Collection Time: 02/15/17  5:51 AM   Result Value Ref Range    Magnesium 1.9 1.6 - 2.4 mg/dL   PHOSPHORUS    Collection Time: 02/15/17  5:51 AM   Result Value Ref Range    Phosphorus 4.1 2.6 - 4.7 MG/DL   HEPATIC FUNCTION PANEL    Collection Time: 02/15/17  5:51 AM   Result Value Ref Range    Protein, total 6.4 6.4 - 8.2 g/dL    Albumin 3.0 (L) 3.5 - 5.0 g/dL    Globulin 3.4 2.0 - 4.0 g/dL    A-G Ratio 0.9 (L) 1.1 - 2.2      Bilirubin, total 0.5 0.2 - 1.0 MG/DL    Bilirubin, direct 0.1 0.0 - 0.2 MG/DL    Alk. phosphatase 97 45 - 117 U/L    AST (SGOT) 14 (L) 15 - 37 U/L    ALT (SGPT) 17 12 - 78 U/L       Imaging  Reviewed:   CT Results (recent):    Results from Hospital Encounter encounter on 02/14/17   CT HEAD WO CONT   Narrative EXAM:  CT HEAD WO CONT    INDICATION:   CN 6 PALSY ON RIGHT AND NO UPWARD GAZE. COMPARISON: None. TECHNIQUE: Unenhanced CT of the head was performed using 5 mm images. Brain and  bone windows were generated. CT dose reduction was achieved through use of a  standardized protocol tailored for this examination and automatic exposure  control for dose modulation. FINDINGS:  The ventricles and sulci are normal in size, shape and configuration and  midline. There is lacunar infarct in the left basal ganglia which is chronic in  appearance. White matter otherwise has unremarkable appearance. Skull base  unremarkable. . There is no intracranial hemorrhage, extra-axial collection,  mass, mass effect or midline shift. The basilar cisterns are open. No acute  infarct is identified. The bone windows demonstrate no abnormalities. There is  slight mucosal thickening in central frontal sinuses and adjacent ethmoid air  cells. .         Impression IMPRESSION:   1. No acute finding in the brain. 2. Old left basal ganglial lacunar infarct.   3. Minimal inflammatory changes or nasal sinuses. MRI Results (recent):    Results from [facility] encounter on 02/14/17   MRI BRAIN MRV WO CONT   Narrative INDICATION:   right cn 6 palsy and no upward gaze    COMPARISON:  Brain MRI of today    TECHNIQUE:    3-D time-of-flight MRA and 2-D time-of-flight MRV of the brain was performed. Multiplanar reconstructions were obtained. FINDINGS:  The vertebral arteries are codominant. The basilar artery and its branches are  normal. The internal carotid, anterior cerebral, and middle cerebral arteries  are patent. There is no flow-limiting intracranial stenosis. There is no  aneurysm. There are no sizable posterior communicating arteries. There is patency of the major venous sinuses. The left transverse sinus is  patent. There is no evidence of sinus stenosis. The deep venous system is also  patent. Impression IMPRESSION:  Negative MRA and MRV of the head. Zarina Gearing Physical Exam  General:  WM in NAD laying in bed  Chest:  Regular rate and rhythm, clear BBS  Neurologic:  Pleasant, cooperative  Eyes:  Notable R medial intranuclear opthalmaplegia, making eye contact, upbeat nystagmus with EOM all directions, horizontal moderate amplitude nystagmus left gaze  Speech:  no Aphasia  Or dysarthria    Mentation:  Awake, alert   Orientation:  x4  Coordination:  No dysmetria FNF or heel to shin  Strength: Equal bilaterally,  proximally & distally  5/5  Movement Exam:    Tremor: none  Sensation:  Equal bilaterally upper and R LE with LT and temp; L LE w/reduced sensation with LT and temp  _____________________________________________________________________________  Assessment:  1. Acute CVA  2. Diplopia continues  3. CVA hx-- no antiplatelet PTA  4. HTN  5. Hyperlipidemia LDL goal < 70  6. PAD hx     Plan  Do not feel c/o numb sensation on Lip is of concern, but if new symptoms arise feel that would be something to evaluate.   He thinks he bit his lip but this may also be part of cell death post small infarct causing this symptom. Testing Pending:  Therapy evaluations--if rehab is needed please consult Rehab medicine   · Medications now and post discharge:   Lipitor 80mg and ASA 81mg  · Neurovascular checks and fall precautions  · Post CVA 24hr BP goal= < 140/90 or defined by IM/FP/Cardiology (hx DM/geriatric etc)  · ST / OT / PT cx post CVA.:  Do not feel IP rehab is needed post discharge  · Case management consult:  discharge planning  · Daily stroke education by nursing documented in chart please. Educated on Stroke: if symptoms re-occur, are similar or different in the future and when to call MD for questions or call 911. Educated on BEFAST and when to call 911. · Risk factor discussion: medication changes per Plan, individual TIA or CVA risk factors noted in Assessment and secondary risk factor reduction on OP basis with PCP  Will have OP Neurology appt in Clinic within 4w post discharge, placed in discharge follow-up   ·  Testing results discussed with patient and/or family --- any questions were answered. Will Sign Off  My collaborating care team physician may have further recommendations.     On DVT Prophylaxis yes no   Continue  lovenox subq while inpatient x      Care Plan discussed with:  Patient x   Family    RN x   Care Manager    Consultant/Specialist:     Patient will be discussed with Dr. Janina Salgado Problems  Date Reviewed: 2/14/2017          Codes Class Noted POA    * (Principal)Acute CVA (cerebrovascular accident) [facility]) ICD-10-CM: I63.9  ICD-9-CM: 434.91  2/15/2017 Yes    Overview Signed 2/15/2017  8:45 AM by Buzz Smith NP     Per clinical exam of diplopia and nystagmus             History of CVA (cerebrovascular accident) (Chronic) ICD-10-CM: Z86.73  ICD-9-CM: V12.54  2/14/2017 Yes    Overview Signed 2/14/2017 11:04 AM by Buzz Smith NP     CT head: chronic L BG infarct             PVD (peripheral vascular disease) ([facility] [address] ICD-10-CM: I73.9  ICD-9-CM: 443.9  Unknown Yes        Hyperlipidemia LDL goal <70 ICD-10-CM: E78.5  ICD-9-CM: 272.4  Unknown Yes        CAD (coronary artery disease) ICD-10-CM: I25.10  ICD-9-CM: 414.00  Unknown Yes    Overview Signed 2/14/2017 11:26 AM by Lanelle Lennox, NP     MI 1996             Diplopia ICD-10-CM: H53.2  ICD-9-CM: 368.2  2/14/2017 Yes        Vomiting ICD-10-CM: R11.10  ICD-9-CM: 787.03  2/14/2017 No    Overview Signed 2/14/2017 11:29 AM by Lanelle Lennox, NP     PTA, occurred this AM             Accelerated hypertension (Chronic) ICD-10-CM: I10  ICD-9-CM: 401.0  2/14/2017 Yes        Carotid stenosis ICD-10-CM: I65.29  ICD-9-CM: 433.10  4/10/2015 Yes            ________________________________________________  - Lanelle Lennox, ACNP-BC  02/15/17  ================================================    ADDENDUM--> Collaborating Care Team Physician:

## 2017-02-15 NOTE — PROGRESS NOTES
Bedside and Verbal shift change report given to ANGÉLICA Marie (oncoming nurse) by Elvia Downs (offgoing nurse). Report included the following information SBAR, Kardex, Procedure Summary, Intake/Output, MAR, Recent Results and Med Rec Status.

## 2017-02-15 NOTE — DISCHARGE INSTRUCTIONS
HOSPITALIST DISCHARGE INSTRUCTIONS  NAME: Yrn Stephenson   :  1955   MRN:  [id number]     Date/Time:  2/15/2017 12:06 PM    ADMIT DATE: 2017     DISCHARGE DATE: 2/15/2017     ADMITTING DIAGNOSIS:  Double vision    DISCHARGE DIAGNOSIS:  stroke    MEDICATIONS:  See after visit summary       · It is important that you take the medication exactly as they are prescribed. · Keep your medication in the bottles provided by the pharmacist and keep a list of the medication names, dosages, and times to be taken in your wallet. · Do not take other medications without consulting your doctor     Pain Management: per above medications    What to do at Home    Recommended diet:  Low fat, Low cholesterol    Recommended activity: Activity as tolerated    1) Return to the hospital if you feel worse    2) If you experience any of the following symptoms then please call your primary care physician or return to the emergency room if you cannot get hold of your doctor:  Fever, chills, nausea, vomiting, diarrhea, change in mentation, falling, bleeding, shortness of breath, chest pain, severe headache, severe abdominal pain,     3) You had a stroke from high blood pressure, high cholesterol, smoking. By treating some of these issues, you risks of having another stroke will decrease    Follow Up: Follow-up Information     Follow up With Details Comments Contact Info    Sakshi Balbuena NP Go on 3/2/2017 Neurology hospital follow-up post Stroke:  Arrival time: 0840 for 0900 appt [address]  [address] [place] [address]      Ye Mcgowan MD Schedule an appointment as soon as possible for a visit in 1 week  [place] [address]  [phone]              Information obtained by :  I understand that if any problems occur once I am at home I am to contact my physician. I understand and acknowledge receipt of the instructions indicated above. [de-identified] or R.N.'s Signature                                                                  Date/Time                                                                                                                                              Patient or Representative Signature                                                          Date/Time         Learning About an Ischemic Stroke  What is an ischemic stroke? An ischemic (say \"Nimo\") stroke occurs when a blood clot blocks a blood vessel in the brain. This means that blood cannot flow to some part of the brain. Without blood and the oxygen it carries, this part of the brain starts to die. The part of the body controlled by the damaged area of the brain cannot work properly. This is different from a hemorrhagic (say \"yjl-uqa-TK-anatolyck\") stroke, which happens when a blood vessel in the brain has burst open or has started to leak. The brain damage from a stroke starts within minutes. Quick treatment can help limit damage to the brain and make recovery more likely. People who have had a stroke may have a hard time talking, understanding things, and making decisions. They may have to relearn daily activities, such as how to eat, bathe, and dress. How well someone recovers from a stroke depends on how quickly the person gets to the hospital, where in the brain the stroke happened, and how severe it was. Training and therapy also make a difference in how well people recover. What are the symptoms? If you have any of these symptoms, call 911 or other emergency services right away:  · You have symptoms of a stroke. These may include:  ¨ Sudden numbness, tingling, weakness, or loss of movement in your face, arm, or leg, especially on only one side of your body. ¨ Sudden vision changes. ¨ Sudden trouble speaking.   ¨ Sudden confusion or trouble understanding simple statements. ¨ Sudden problems with walking or balance. ¨ A sudden, severe headache that is different from past headaches. See your doctor if you have symptoms that seem like a stroke, even if they go away quickly. You may have had a transient ischemic attack (TIA), sometimes called a mini-stroke. A TIA is a warning that a stroke may happen soon. Getting early treatment for a TIA can help prevent a stroke. What causes an ischemic stroke? An ischemic stroke is caused by a blood clot that blocks blood flow in the brain. The most common causes of blood clots include:  · Hardening of the arteries (atherosclerosis). This is caused by high blood pressure, diabetes, high cholesterol, or smoking. · Atrial fibrillation. How is ischemic stroke treated? You may have to take several medicines, depending on what caused your stroke. Ask your doctor if a stroke rehab program is right for you. Rehab increases your chances of getting back some of the abilities you lost.  Follow-up care is a key part of your treatment and safety. Be sure to make and go to all appointments, and call your doctor if you are having problems. It's also a good idea to know your test results and keep a list of the medicines you take. How can you prevent another stroke? · Work with your doctor to treat any health problems you have. High blood pressure, high cholesterol, atrial fibrillation, and diabetes all raise your chances of having a stroke. · Be safe with medicines. Take your medicine exactly as prescribed. Call your doctor if you think you are having a problem with your medicine. · Have a healthy lifestyle. ¨ Do not smoke or allow others to smoke around you. If you need help quitting, talk to your doctor about stop-smoking programs and medicines. These can increase your chances of quitting for good. Smoking makes a stroke more likely.   ¨ Limit alcohol to 2 drinks a day for men and 1 drink a day for women.  ¨ Lose weight if you need to. A healthy weight will help you keep your heart and body healthy. ¨ Be active. Ask your doctor what type and level of activity is safe for you. ¨ Eat heart-healthy foods, like fruits, vegetables, and high-fiber foods. Where can you learn more? Go to http://trent-heidi.info/. Enter D161 in the search box to learn more about \"Learning About an Ischemic Stroke. \"  Current as of: June 4, 2016  Content Version: 11.1  © 3950-0664 [employer]. Care instructions adapted under license by [employer] (which disclaims liability or warranty for this information). If you have questions about a medical condition or this instruction, always ask your healthcare professional. [address] any warranty or liability for your use of this information.

## 2017-02-15 NOTE — PROGRESS NOTES
7:44 PM Bedside and Verbal shift change report given to Lourdes Sevilla RN (oncoming nurse) by Neema Mercer RN (offgoing nurse). Report included the following information SBAR and Kardex.

## 2017-02-16 LAB — LDLC SERPL DIRECT ASSAY-MCNC: 121 MG/DL (ref 0–100)

## 2018-07-12 ENCOUNTER — HOSPITAL ENCOUNTER (OUTPATIENT)
Dept: CT IMAGING | Age: 63
Discharge: HOME | End: 2018-07-12
Attending: FAMILY MEDICINE
Payer: MEDICARE

## 2018-07-12 DIAGNOSIS — R48.1 AGNOSIA: ICD-10-CM

## 2018-07-12 PROCEDURE — 70450 CT HEAD/BRAIN W/O DYE: CPT

## 2018-11-17 ENCOUNTER — APPOINTMENT (OUTPATIENT)
Dept: GENERAL RADIOLOGY | Age: 63
DRG: 304 | End: 2018-11-17
Attending: STUDENT IN AN ORGANIZED HEALTH CARE EDUCATION/TRAINING PROGRAM
Payer: MEDICARE

## 2018-11-17 ENCOUNTER — HOSPITAL ENCOUNTER (INPATIENT)
Age: 63
LOS: 4 days | Discharge: HOME | DRG: 304 | End: 2018-11-21
Attending: STUDENT IN AN ORGANIZED HEALTH CARE EDUCATION/TRAINING PROGRAM | Admitting: INTERNAL MEDICINE
Payer: MEDICARE

## 2018-11-17 DIAGNOSIS — I16.0 HYPERTENSIVE URGENCY, MALIGNANT: Primary | ICD-10-CM

## 2018-11-17 PROBLEM — I16.1 HYPERTENSIVE EMERGENCY: Status: ACTIVE | Noted: 2018-11-17

## 2018-11-17 PROBLEM — R60.9 EDEMA: Status: ACTIVE | Noted: 2018-11-17

## 2018-11-17 PROBLEM — R79.89 ELEVATED SERUM CREATININE: Status: ACTIVE | Noted: 2018-11-17

## 2018-11-17 PROBLEM — R73.9 HYPERGLYCEMIA: Status: ACTIVE | Noted: 2018-11-17

## 2018-11-17 PROBLEM — N17.9 AKI (ACUTE KIDNEY INJURY) (HCC): Status: ACTIVE | Noted: 2018-11-17

## 2018-11-17 PROBLEM — I63.9 ACUTE CVA (CEREBROVASCULAR ACCIDENT) (HCC): Status: RESOLVED | Noted: 2017-02-15 | Resolved: 2018-11-17

## 2018-11-17 LAB
ALBUMIN SERPL-MCNC: 2.8 G/DL (ref 3.5–5)
ALBUMIN/GLOB SERPL: 0.9 {RATIO} (ref 1.1–2.2)
ALP SERPL-CCNC: 115 U/L (ref 45–117)
ALT SERPL-CCNC: 34 U/L (ref 12–78)
ANION GAP SERPL CALC-SCNC: 8 MMOL/L (ref 5–15)
APPEARANCE UR: CLEAR
AST SERPL-CCNC: 18 U/L (ref 15–37)
ATRIAL RATE: 84 BPM
BACTERIA URNS QL MICRO: NEGATIVE /HPF
BASOPHILS # BLD: 0 K/UL (ref 0–0.1)
BASOPHILS NFR BLD: 0 % (ref 0–1)
BILIRUB SERPL-MCNC: 0.6 MG/DL (ref 0.2–1)
BILIRUB UR QL: NEGATIVE
BNP SERPL-MCNC: 6183 PG/ML (ref 0–125)
BUN SERPL-MCNC: 33 MG/DL (ref 6–20)
BUN/CREAT SERPL: 21 (ref 12–20)
CALCIUM SERPL-MCNC: 8.1 MG/DL (ref 8.5–10.1)
CALCULATED R AXIS, ECG10: 5 DEGREES
CALCULATED T AXIS, ECG11: 86 DEGREES
CHLORIDE SERPL-SCNC: 104 MMOL/L (ref 97–108)
CK MB CFR SERPL CALC: 10.2 % (ref 0–2.5)
CK MB SERPL-MCNC: 4.2 NG/ML (ref 5–25)
CK SERPL-CCNC: 41 U/L (ref 39–308)
CO2 SERPL-SCNC: 30 MMOL/L (ref 21–32)
COLOR UR: ABNORMAL
COMMENT, HOLDF: NORMAL
CREAT SERPL-MCNC: 1.55 MG/DL (ref 0.7–1.3)
CREAT UR-MCNC: 81.33 MG/DL
DIAGNOSIS, 93000: NORMAL
DIFFERENTIAL METHOD BLD: ABNORMAL
EOSINOPHIL # BLD: 0.3 K/UL (ref 0–0.4)
EOSINOPHIL NFR BLD: 2 % (ref 0–7)
EPITH CASTS URNS QL MICRO: ABNORMAL /LPF
ERYTHROCYTE [DISTWIDTH] IN BLOOD BY AUTOMATED COUNT: 14.7 % (ref 11.5–14.5)
GLOBULIN SER CALC-MCNC: 3.2 G/DL (ref 2–4)
GLUCOSE SERPL-MCNC: 174 MG/DL (ref 65–100)
GLUCOSE UR STRIP.AUTO-MCNC: 250 MG/DL
HCT VFR BLD AUTO: 40.1 % (ref 36.6–50.3)
HGB BLD-MCNC: 12.9 G/DL (ref 12.1–17)
HGB UR QL STRIP: NEGATIVE
HYALINE CASTS URNS QL MICRO: ABNORMAL /LPF (ref 0–5)
IMM GRANULOCYTES # BLD: 0.1 K/UL (ref 0–0.04)
IMM GRANULOCYTES NFR BLD AUTO: 1 % (ref 0–0.5)
KETONES UR QL STRIP.AUTO: NEGATIVE MG/DL
LEUKOCYTE ESTERASE UR QL STRIP.AUTO: NEGATIVE
LYMPHOCYTES # BLD: 3.7 K/UL (ref 0.8–3.5)
LYMPHOCYTES NFR BLD: 23 % (ref 12–49)
MCH RBC QN AUTO: 29.1 PG (ref 26–34)
MCHC RBC AUTO-ENTMCNC: 32.2 G/DL (ref 30–36.5)
MCV RBC AUTO: 90.3 FL (ref 80–99)
MONOCYTES # BLD: 1.2 K/UL (ref 0–1)
MONOCYTES NFR BLD: 7 % (ref 5–13)
NEUTS SEG # BLD: 10.8 K/UL (ref 1.8–8)
NEUTS SEG NFR BLD: 67 % (ref 32–75)
NITRITE UR QL STRIP.AUTO: NEGATIVE
NRBC # BLD: 0 K/UL (ref 0–0.01)
NRBC BLD-RTO: 0 PER 100 WBC
P-R INTERVAL, ECG05: 198 MS
PH UR STRIP: 6 [PH] (ref 5–8)
PLATELET # BLD AUTO: 200 K/UL (ref 150–400)
PMV BLD AUTO: 9.8 FL (ref 8.9–12.9)
POTASSIUM SERPL-SCNC: 3.8 MMOL/L (ref 3.5–5.1)
PROT SERPL-MCNC: 6 G/DL (ref 6.4–8.2)
PROT UR STRIP-MCNC: >300 MG/DL
PROT UR-MCNC: 649 MG/DL (ref 0–11.9)
Q-T INTERVAL, ECG07: 396 MS
QRS DURATION, ECG06: 120 MS
QTC CALCULATION (BEZET), ECG08: 467 MS
RBC # BLD AUTO: 4.44 M/UL (ref 4.1–5.7)
RBC #/AREA URNS HPF: ABNORMAL /HPF (ref 0–5)
SAMPLES BEING HELD,HOLD: NORMAL
SODIUM SERPL-SCNC: 142 MMOL/L (ref 136–145)
SP GR UR REFRACTOMETRY: 1.02 (ref 1–1.03)
TROPONIN I SERPL-MCNC: 0.1 NG/ML
TROPONIN I SERPL-MCNC: 0.11 NG/ML
TROPONIN I SERPL-MCNC: 0.14 NG/ML
UA: UC IF INDICATED,UAUC: ABNORMAL
UROBILINOGEN UR QL STRIP.AUTO: 1 EU/DL (ref 0.2–1)
VENTRICULAR RATE, ECG03: 84 BPM
WBC # BLD AUTO: 16 K/UL (ref 4.1–11.1)
WBC URNS QL MICRO: ABNORMAL /HPF (ref 0–4)

## 2018-11-17 PROCEDURE — 82570 ASSAY OF URINE CREATININE: CPT

## 2018-11-17 PROCEDURE — 85025 COMPLETE CBC W/AUTO DIFF WBC: CPT

## 2018-11-17 PROCEDURE — 74011250637 HC RX REV CODE- 250/637: Performed by: SPECIALIST

## 2018-11-17 PROCEDURE — 80053 COMPREHEN METABOLIC PANEL: CPT

## 2018-11-17 PROCEDURE — 65660000000 HC RM CCU STEPDOWN

## 2018-11-17 PROCEDURE — 74011250637 HC RX REV CODE- 250/637: Performed by: STUDENT IN AN ORGANIZED HEALTH CARE EDUCATION/TRAINING PROGRAM

## 2018-11-17 PROCEDURE — 84484 ASSAY OF TROPONIN QUANT: CPT

## 2018-11-17 PROCEDURE — 96374 THER/PROPH/DIAG INJ IV PUSH: CPT

## 2018-11-17 PROCEDURE — 94640 AIRWAY INHALATION TREATMENT: CPT

## 2018-11-17 PROCEDURE — 74011250637 HC RX REV CODE- 250/637: Performed by: INTERNAL MEDICINE

## 2018-11-17 PROCEDURE — 93970 EXTREMITY STUDY: CPT

## 2018-11-17 PROCEDURE — 84156 ASSAY OF PROTEIN URINE: CPT

## 2018-11-17 PROCEDURE — 82550 ASSAY OF CK (CPK): CPT

## 2018-11-17 PROCEDURE — 74011250636 HC RX REV CODE- 250/636: Performed by: INTERNAL MEDICINE

## 2018-11-17 PROCEDURE — 93880 EXTRACRANIAL BILAT STUDY: CPT

## 2018-11-17 PROCEDURE — 99285 EMERGENCY DEPT VISIT HI MDM: CPT

## 2018-11-17 PROCEDURE — 93005 ELECTROCARDIOGRAM TRACING: CPT

## 2018-11-17 PROCEDURE — 71045 X-RAY EXAM CHEST 1 VIEW: CPT

## 2018-11-17 PROCEDURE — 83880 ASSAY OF NATRIURETIC PEPTIDE: CPT

## 2018-11-17 PROCEDURE — 81001 URINALYSIS AUTO W/SCOPE: CPT

## 2018-11-17 PROCEDURE — 36415 COLL VENOUS BLD VENIPUNCTURE: CPT

## 2018-11-17 PROCEDURE — 74011636637 HC RX REV CODE- 636/637: Performed by: INTERNAL MEDICINE

## 2018-11-17 PROCEDURE — 74011000250 HC RX REV CODE- 250: Performed by: INTERNAL MEDICINE

## 2018-11-17 PROCEDURE — 74011000250 HC RX REV CODE- 250: Performed by: STUDENT IN AN ORGANIZED HEALTH CARE EDUCATION/TRAINING PROGRAM

## 2018-11-17 PROCEDURE — 77030013140 HC MSK NEB VYRM -A

## 2018-11-17 RX ORDER — IPRATROPIUM BROMIDE AND ALBUTEROL SULFATE 2.5; .5 MG/3ML; MG/3ML
3 SOLUTION RESPIRATORY (INHALATION)
Status: DISCONTINUED | OUTPATIENT
Start: 2018-11-17 | End: 2018-11-18

## 2018-11-17 RX ORDER — ATORVASTATIN CALCIUM 80 MG/1
80 TABLET, FILM COATED ORAL DAILY
COMMUNITY

## 2018-11-17 RX ORDER — ALBUTEROL SULFATE 0.83 MG/ML
2.5 SOLUTION RESPIRATORY (INHALATION)
Status: DISCONTINUED | OUTPATIENT
Start: 2018-11-17 | End: 2018-11-21 | Stop reason: HOSPADM

## 2018-11-17 RX ORDER — ENOXAPARIN SODIUM 100 MG/ML
40 INJECTION SUBCUTANEOUS EVERY 24 HOURS
Status: DISCONTINUED | OUTPATIENT
Start: 2018-11-17 | End: 2018-11-19

## 2018-11-17 RX ORDER — LABETALOL HYDROCHLORIDE 5 MG/ML
20 INJECTION, SOLUTION INTRAVENOUS
Status: DISCONTINUED | OUTPATIENT
Start: 2018-11-17 | End: 2018-11-17

## 2018-11-17 RX ORDER — HYDRALAZINE HYDROCHLORIDE 20 MG/ML
10 INJECTION INTRAMUSCULAR; INTRAVENOUS ONCE
Status: COMPLETED | OUTPATIENT
Start: 2018-11-18 | End: 2018-11-18

## 2018-11-17 RX ORDER — GUAIFENESIN 100 MG/5ML
162 LIQUID (ML) ORAL
Status: COMPLETED | OUTPATIENT
Start: 2018-11-17 | End: 2018-11-17

## 2018-11-17 RX ORDER — GUAIFENESIN 600 MG/1
600 TABLET, EXTENDED RELEASE ORAL EVERY 12 HOURS
Status: DISCONTINUED | OUTPATIENT
Start: 2018-11-17 | End: 2018-11-17

## 2018-11-17 RX ORDER — AMLODIPINE BESYLATE 5 MG/1
5 TABLET ORAL DAILY
Status: DISCONTINUED | OUTPATIENT
Start: 2018-11-17 | End: 2018-11-18

## 2018-11-17 RX ORDER — PREDNISONE 20 MG/1
20 TABLET ORAL
Status: DISCONTINUED | OUTPATIENT
Start: 2018-11-17 | End: 2018-11-18

## 2018-11-17 RX ORDER — NITROGLYCERIN 0.4 MG/1
0.4 TABLET SUBLINGUAL
Status: COMPLETED | OUTPATIENT
Start: 2018-11-17 | End: 2018-11-17

## 2018-11-17 RX ORDER — SODIUM CHLORIDE 0.9 % (FLUSH) 0.9 %
5-10 SYRINGE (ML) INJECTION EVERY 8 HOURS
Status: DISCONTINUED | OUTPATIENT
Start: 2018-11-17 | End: 2018-11-21 | Stop reason: HOSPADM

## 2018-11-17 RX ORDER — CODEINE PHOSPHATE AND GUAIFENESIN 10; 100 MG/5ML; MG/5ML
5 SOLUTION ORAL
Status: DISCONTINUED | OUTPATIENT
Start: 2018-11-17 | End: 2018-11-21 | Stop reason: HOSPADM

## 2018-11-17 RX ORDER — PREDNISONE 10 MG/1
30 TABLET ORAL DAILY
Status: ON HOLD | COMMUNITY
End: 2018-11-21 | Stop reason: SDUPTHER

## 2018-11-17 RX ORDER — IBUPROFEN 200 MG
1 TABLET ORAL EVERY 24 HOURS
Status: DISCONTINUED | OUTPATIENT
Start: 2018-11-17 | End: 2018-11-21 | Stop reason: HOSPADM

## 2018-11-17 RX ORDER — ATORVASTATIN CALCIUM 20 MG/1
80 TABLET, FILM COATED ORAL
Status: DISCONTINUED | OUTPATIENT
Start: 2018-11-17 | End: 2018-11-21 | Stop reason: HOSPADM

## 2018-11-17 RX ORDER — SODIUM CHLORIDE 0.9 % (FLUSH) 0.9 %
5-10 SYRINGE (ML) INJECTION AS NEEDED
Status: DISCONTINUED | OUTPATIENT
Start: 2018-11-17 | End: 2018-11-21 | Stop reason: HOSPADM

## 2018-11-17 RX ORDER — ZOLPIDEM TARTRATE 5 MG/1
5 TABLET ORAL
Status: DISCONTINUED | OUTPATIENT
Start: 2018-11-17 | End: 2018-11-21 | Stop reason: HOSPADM

## 2018-11-17 RX ORDER — LABETALOL HYDROCHLORIDE 5 MG/ML
20 INJECTION, SOLUTION INTRAVENOUS
Status: COMPLETED | OUTPATIENT
Start: 2018-11-17 | End: 2018-11-17

## 2018-11-17 RX ORDER — PROCHLORPERAZINE EDISYLATE 5 MG/ML
10 INJECTION INTRAMUSCULAR; INTRAVENOUS
Status: DISCONTINUED | OUTPATIENT
Start: 2018-11-17 | End: 2018-11-21 | Stop reason: HOSPADM

## 2018-11-17 RX ORDER — HYDRALAZINE HYDROCHLORIDE 20 MG/ML
10 INJECTION INTRAMUSCULAR; INTRAVENOUS
Status: DISCONTINUED | OUTPATIENT
Start: 2018-11-17 | End: 2018-11-18

## 2018-11-17 RX ORDER — ACETAMINOPHEN 325 MG/1
650 TABLET ORAL
Status: DISCONTINUED | OUTPATIENT
Start: 2018-11-17 | End: 2018-11-21 | Stop reason: HOSPADM

## 2018-11-17 RX ORDER — CLONIDINE HYDROCHLORIDE 0.1 MG/1
0.1 TABLET ORAL 2 TIMES DAILY
Status: DISCONTINUED | OUTPATIENT
Start: 2018-11-17 | End: 2018-11-17

## 2018-11-17 RX ORDER — BUMETANIDE 1 MG/1
2 TABLET ORAL DAILY
Status: DISCONTINUED | OUTPATIENT
Start: 2018-11-17 | End: 2018-11-19

## 2018-11-17 RX ORDER — OXYCODONE AND ACETAMINOPHEN 5; 325 MG/1; MG/1
1 TABLET ORAL
Status: DISCONTINUED | OUTPATIENT
Start: 2018-11-17 | End: 2018-11-21 | Stop reason: HOSPADM

## 2018-11-17 RX ORDER — CLONIDINE HYDROCHLORIDE 0.1 MG/1
0.2 TABLET ORAL
Status: COMPLETED | OUTPATIENT
Start: 2018-11-17 | End: 2018-11-17

## 2018-11-17 RX ORDER — CARVEDILOL 6.25 MG/1
6.25 TABLET ORAL 2 TIMES DAILY WITH MEALS
Status: DISCONTINUED | OUTPATIENT
Start: 2018-11-17 | End: 2018-11-18

## 2018-11-17 RX ORDER — HYDROMORPHONE HYDROCHLORIDE 2 MG/ML
0.5 INJECTION, SOLUTION INTRAMUSCULAR; INTRAVENOUS; SUBCUTANEOUS
Status: DISCONTINUED | OUTPATIENT
Start: 2018-11-17 | End: 2018-11-21 | Stop reason: HOSPADM

## 2018-11-17 RX ADMIN — CLONIDINE HYDROCHLORIDE 0.2 MG: 0.1 TABLET ORAL at 02:35

## 2018-11-17 RX ADMIN — PREDNISONE 20 MG: 20 TABLET ORAL at 12:16

## 2018-11-17 RX ADMIN — LABETALOL HYDROCHLORIDE 20 MG: 5 INJECTION INTRAVENOUS at 03:38

## 2018-11-17 RX ADMIN — PREDNISONE 20 MG: 20 TABLET ORAL at 18:02

## 2018-11-17 RX ADMIN — NITROGLYCERIN 0.4 MG: 0.4 TABLET, ORALLY DISINTEGRATING SUBLINGUAL at 02:36

## 2018-11-17 RX ADMIN — BUMETANIDE 2 MG: 1 TABLET ORAL at 12:16

## 2018-11-17 RX ADMIN — AMLODIPINE BESYLATE 5 MG: 5 TABLET ORAL at 12:16

## 2018-11-17 RX ADMIN — IPRATROPIUM BROMIDE AND ALBUTEROL SULFATE 3 ML: .5; 3 SOLUTION RESPIRATORY (INHALATION) at 19:23

## 2018-11-17 RX ADMIN — CLONIDINE HYDROCHLORIDE 0.1 MG: 0.1 TABLET ORAL at 08:38

## 2018-11-17 RX ADMIN — Medication 10 ML: at 21:21

## 2018-11-17 RX ADMIN — GUAIFENESIN AND CODEINE PHOSPHATE 5 ML: 100; 10 SOLUTION ORAL at 21:21

## 2018-11-17 RX ADMIN — ENOXAPARIN SODIUM 40 MG: 40 INJECTION SUBCUTANEOUS at 08:39

## 2018-11-17 RX ADMIN — ASPIRIN 81 MG 162 MG: 81 TABLET ORAL at 03:45

## 2018-11-17 RX ADMIN — Medication 5 ML: at 16:34

## 2018-11-17 RX ADMIN — Medication 10 ML: at 08:39

## 2018-11-17 RX ADMIN — IPRATROPIUM BROMIDE AND ALBUTEROL SULFATE 3 ML: .5; 3 SOLUTION RESPIRATORY (INHALATION) at 13:48

## 2018-11-17 RX ADMIN — ATORVASTATIN CALCIUM 80 MG: 20 TABLET, FILM COATED ORAL at 21:22

## 2018-11-17 RX ADMIN — GUAIFENESIN AND CODEINE PHOSPHATE 5 ML: 100; 10 SOLUTION ORAL at 13:22

## 2018-11-17 RX ADMIN — HYDRALAZINE HYDROCHLORIDE 10 MG: 20 INJECTION INTRAMUSCULAR; INTRAVENOUS at 21:21

## 2018-11-17 RX ADMIN — CARVEDILOL 6.25 MG: 6.25 TABLET, FILM COATED ORAL at 18:02

## 2018-11-17 NOTE — CONSULTS
Zully eWbb MD. [facility] [place]              Patient: Annette Joy  : 1955      Today's Date: 2018          HISTORY OF PRESENT ILLNESS:     History of Present Illness:  Reason for consult: hypertensive urgency, +troponin    Mr. Krystle Goodson is a 58 y.o.  male with a history of CAD, CKD, HTN, CVA who presents 18 complaining of SOB and chest pain. He's had FIGUEROA for several weeks with orthopnea as well. He has history of intermittent angina, but had chest tightness for a couple of nights requiring SL NTG. His blood pressure on admission was 240/97. He is working with Dr. Ceci Santos on treating his renal disease (with prednisone). He last saw Dr. Thania Cox in . He continues to smoke. He is CP free when I see him today. PAST MEDICAL HISTORY:     Past Medical History:   Diagnosis Date    Acute CVA (cerebrovascular accident) ([address] 2/15/2017    Per clinical exam    CAD (coronary artery disease)     MI ; stenting x 2 in      Carotid artery disease ([address]     Cough     Diplopia 2017    Fatigue     FSGS (focal segmental glomerulosclerosis)     Headache     History of CVA (cerebrovascular accident) 2017    CT head: chronic L BG infarct    Hyperlipidemia     Hypertension     Mitral regurgitation     Mod-severe MR in     Muscle weakness     PVD (peripheral vascular disease) ([address]          Past Surgical History:   Procedure Laterality Date    CARDIAC SURG PROCEDURE UNLIST      4 cardiac stents, 3 stens to L leg, 2 stens R leg, 1 stent to abdomen    HX MOHS PROCEDURES Right 2015    HX ORTHOPAEDIC Left 2015    rotator cuff manipulation    NEUROLOGICAL PROCEDURE UNLISTED      EMG         MEDICATIONS:         Current Outpatient Medications on File Prior to Encounter   Medication Sig Dispense Refill    atorvastatin (LIPITOR) 80 mg tablet Take 80 mg by mouth daily.  predniSONE (DELTASONE) 10 mg tablet Take 30 mg by mouth daily.       amLODIPine (NORVASC) 10 mg tablet Take 1 Tab by mouth daily. 30 Tab 1           Allergies   Allergen Reactions    Ace Inhibitors Cough             SOCIAL HISTORY:     Social History     Tobacco Use    Smoking status: Current Every Day Smoker     Packs/day: 0.25     Types: Cigarettes     Last attempt to quit: 2016     Years since quittin.7    Smokeless tobacco: Current User   Substance Use Topics    Alcohol use: Yes     Alcohol/week: 12.0 oz     Types: 24 Cans of beer per week     Comment: socially    Drug use: No           FAMILY HISTORY:     Family History   Problem Relation Age of Onset    Heart Disease Brother              REVIEW OF SYMPTOMS:     Review of Symptoms:  Constitutional: Negative for fever, chills  HEENT: Negative for nosebleeds, tinnitus, and vision changes. Respiratory: + FIGUEROA  Cardiovascular: + orthopnea, PND  Gastrointestinal: Negative for abdominal pain, diarrhea, melena. Genitourinary: Negative for dysuria  Musculoskeletal: Negative for myalgias. Skin: Negative for rash  Heme: No problems bleeding. Neurological: Negative for speech change and focal weakness. PHYSICAL EXAM:     Physical Exam:  Visit Vitals  /71 (BP 1 Location: Right arm, BP Patient Position: At rest)   Pulse 68   Temp 97.9 °F (36.6 °C)   Resp (!) 94   Ht 5' 9\" (1.753 m)   Wt 159 lb 13.3 oz (72.5 kg)   SpO2 95%   BMI 23.60 kg/m²     Patient appears generally well, mood and affect are appropriate and pleasant. HEENT:  Hearing intact, non-icteric, normocephalic, atraumatic. Neck Exam: Supple, + bilat bruits   Lung Exam: Clear ant   Cardiac Exam: Regular rate and rhythm with no murmur - distant   Abdomen: Soft, non-tender, obese . Extremities: Moves all ext well. Mild lower extremity edema. Vascular: 2+ dorsalis pedis pulses bilaterally.   Psych: Appropriate affect  Neuro - Grossly intact        LABS / OTHER STUDIES:     Recent Results (from the past 24 hour(s))   CBC WITH AUTOMATED DIFF    Collection Time: 11/17/18  2:43 AM   Result Value Ref Range    WBC 16.0 (H) 4.1 - 11.1 K/uL    RBC 4.44 4.10 - 5.70 M/uL    HGB 12.9 12.1 - 17.0 g/dL    HCT 40.1 36.6 - 50.3 %    MCV 90.3 80.0 - 99.0 FL    MCH 29.1 26.0 - 34.0 PG    MCHC 32.2 30.0 - 36.5 g/dL    RDW 14.7 (H) 11.5 - 14.5 %    PLATELET 362 204 - 309 K/uL    MPV 9.8 8.9 - 12.9 FL    NRBC 0.0 0  WBC    ABSOLUTE NRBC 0.00 0.00 - 0.01 K/uL    NEUTROPHILS 67 32 - 75 %    LYMPHOCYTES 23 12 - 49 %    MONOCYTES 7 5 - 13 %    EOSINOPHILS 2 0 - 7 %    BASOPHILS 0 0 - 1 %    IMMATURE GRANULOCYTES 1 (H) 0.0 - 0.5 %    ABS. NEUTROPHILS 10.8 (H) 1.8 - 8.0 K/UL    ABS. LYMPHOCYTES 3.7 (H) 0.8 - 3.5 K/UL    ABS. MONOCYTES 1.2 (H) 0.0 - 1.0 K/UL    ABS. EOSINOPHILS 0.3 0.0 - 0.4 K/UL    ABS. BASOPHILS 0.0 0.0 - 0.1 K/UL    ABS. IMM. GRANS. 0.1 (H) 0.00 - 0.04 K/UL    DF AUTOMATED     METABOLIC PANEL, COMPREHENSIVE    Collection Time: 11/17/18  2:43 AM   Result Value Ref Range    Sodium 142 136 - 145 mmol/L    Potassium 3.8 3.5 - 5.1 mmol/L    Chloride 104 97 - 108 mmol/L    CO2 30 21 - 32 mmol/L    Anion gap 8 5 - 15 mmol/L    Glucose 174 (H) 65 - 100 mg/dL    BUN 33 (H) 6 - 20 MG/DL    Creatinine 1.55 (H) 0.70 - 1.30 MG/DL    BUN/Creatinine ratio 21 (H) 12 - 20      GFR est AA 55 (L) >60 ml/min/1.73m2    GFR est non-AA 45 (L) >60 ml/min/1.73m2    Calcium 8.1 (L) 8.5 - 10.1 MG/DL    Bilirubin, total 0.6 0.2 - 1.0 MG/DL    ALT (SGPT) 34 12 - 78 U/L    AST (SGOT) 18 15 - 37 U/L    Alk.  phosphatase 115 45 - 117 U/L    Protein, total 6.0 (L) 6.4 - 8.2 g/dL    Albumin 2.8 (L) 3.5 - 5.0 g/dL    Globulin 3.2 2.0 - 4.0 g/dL    A-G Ratio 0.9 (L) 1.1 - 2.2     NT-PRO BNP    Collection Time: 11/17/18  2:43 AM   Result Value Ref Range    NT pro-BNP 6,183 (H) 0 - 125 PG/ML   TROPONIN I    Collection Time: 11/17/18  2:43 AM   Result Value Ref Range    Troponin-I, Qt. 0.11 (H) <0.05 ng/mL   EKG, 12 LEAD, INITIAL    Collection Time: 11/17/18  2:52 AM   Result Value Ref Range Ventricular Rate 84 BPM    Atrial Rate 84 BPM    P-R Interval 198 ms    QRS Duration 120 ms    Q-T Interval 396 ms    QTC Calculation (Bezet) 467 ms    Calculated R Axis 5 degrees    Calculated T Axis 86 degrees    Diagnosis       Normal sinus rhythm  Left ventricular hypertrophy with QRS widening and repolarization abnormality  Abnormal ECG  When compared with ECG of 14-FEB-2017 09:11,  Non-specific change in ST segment in Lateral leads     SAMPLES BEING HELD    Collection Time: 11/17/18  2:54 AM   Result Value Ref Range    SAMPLES BEING HELD SST,RD,MIGUEL     COMMENT        Add-on orders for these samples will be processed based on acceptable specimen integrity and analyte stability, which may vary by analyte.    TROPONIN I    Collection Time: 11/17/18  9:42 AM   Result Value Ref Range    Troponin-I, Qt. 0.14 (H) <0.05 ng/mL   URINALYSIS W/ REFLEX CULTURE    Collection Time: 11/17/18 11:20 AM   Result Value Ref Range    Color YELLOW/STRAW      Appearance CLEAR CLEAR      Specific gravity 1.018 1.003 - 1.030      pH (UA) 6.0 5.0 - 8.0      Protein >300 (A) NEG mg/dL    Glucose 250 (A) NEG mg/dL    Ketone NEGATIVE  NEG mg/dL    Bilirubin NEGATIVE  NEG      Blood NEGATIVE  NEG      Urobilinogen 1.0 0.2 - 1.0 EU/dL    Nitrites NEGATIVE  NEG      Leukocyte Esterase NEGATIVE  NEG      WBC 0-4 0 - 4 /hpf    RBC 0-5 0 - 5 /hpf    Epithelial cells FEW FEW /lpf    Bacteria NEGATIVE  NEG /hpf    UA:UC IF INDICATED CULTURE NOT INDICATED BY UA RESULT CNI      Hyaline cast 2-5 0 - 5 /lpf   PROTEIN URINE, RANDOM    Collection Time: 11/17/18 11:20 AM   Result Value Ref Range    Protein, urine random 649 (H) 0.0 - 11.9 mg/dL   CREATININE, UR, RANDOM    Collection Time: 11/17/18 11:20 AM   Result Value Ref Range    Creatinine, urine 81.33 mg/dL   CK W/ CKMB & INDEX    Collection Time: 11/17/18  5:00 PM   Result Value Ref Range    CK 41 39 - 308 U/L    CK - MB 4.2 (H) <3.6 NG/ML    CK-MB Index 10.2 (H) 0 - 2.5               CARDIAC DIAGNOSTICS:     Cardiac Evaluation Includes:        Cath (8/3/12): LM ok, 50% mid LAD, mid 70% stenosis in LCX. RCA is dominant and completely occluded proximally (filled by collaterals)   -->  He received 2 stents in the LCX. (promus)                                                           KELLEY's - 8/10/13: Right ankle arm 0.72 and left 0.52     Echo 8/13 - EF 55% mild LAE, modeerate Mr     Echo (4/17/15) - LVEF 55%  There was akinesis of the basal inferior wall(s). There was severe hypokinesis of the basal inferoseptal and basal inferolateral wall(s). Mod-severe MR. Carotid Doppler 4/15 -  16-49% stenosis bilaterally    Lexiscan Cardiolite 3/16 - partially reversible inferior wall defect. SSS = 5, SRS = 1, SDS - 4. LVEF 54%    Echo 3/31/16 - LVEF 55-60%, akinesis of inferior wall. Severe MR. Echo 2/14/17 - LVEF 55-60%, LAE. Neg bubble study. Mild MR. Carotid Doppler 2/17 - mild disease bilat       EKG 11/17/18 - NSR, LVH, PRWP  - I viewed EKG myself (similar to prior EKG)     CXR 11/17/18 - no acute process, calcified right pleural plaque       ASSESSMENT AND PLAN:     Assessment and Plan:  Mr. Marylou Mariscal is a 58 y.o.  male with a history of CAD, CKD, HTN, CVA who presents 11/17/18 complaining of SOB and chest pain. He's had FIGUEROA for several weeks with orthopnea as well. He has history of intermittent angina, but had chest tightness for a couple of nights requiring SL NTG. His blood pressure on admission was 240/97. 1) Hypertensive Urgency    - He says -170 for over a year. /97 on admission.   - Suspect a lot of his problems (decompensated CHF, angina, elevated trop) could be due to uncontrolled HTN.   - Will work with Nephrology on the anti-hypertensive regimen given his renal disease      2) CAD and chest pain; elevated troponin    - he has known CAD with his RCA chronically occluded.    - he continues to smoke and last saw Dr. Jonathon Orr in 2015  - Will treat medically for CAD and control his BP first  - If he continues to have anginal symptoms despite meds, then could proceed with further workup (stress test vs cath)   - Would like him on ASA, statin, and a BB (will start Coreg) for CAD/angina to start   - smoking cessation advised     3) Acute decompensated CHF (HFpEF suspected)   - he has had FIGUEROA, orthopnea, and PND  - ProBNP elevated  - Will try and diurese, watching renal function - nephrology added Bumex 2 mg today at 6 - will see how he does with that before adding anything else. - echo pending     4) Neck bruits   - check carotid dopplers    5) History of MR  - checking an echo     6) CKD (presumed FSGS)  - per renal       Kat Oscar MD, [facility]  [facility], [address]  [place] [address].  [address]  Ph: [phone]   Ph [phone]

## 2018-11-17 NOTE — ED PROVIDER NOTES
58 y.o. male with past medical history significant for CAD, HLD, HTN, PVD, CVA, COPD, s/p 11 stent placements (4 cardiac, 3 LLE, 2 RLE, 1 abd, 1 unknown) presents via EMS with chief complaint of shortness of breath that started approximately 3 weeks ago and worsened 2.5 hours PTA. Pt reports that he was started on prednisone by his renal specialist about 3 weeks ago, noting that he has since been experiencing intermittent episodes of SOB, particularly when waling short distances. He explains that he was originally on 60 mg prednisone before he was taken down to 40 mg, and then 30 mg due to experiencing LE swelling. Tonight, the pt indicates that his BP ws 240/110, prompting him to take a nitro tab, and then 3 subsequent nitro tabs within the next two hours, being that his BP was not improving. He then called EMS, and reports that he received a neb treatment en route to ED without any relief. Pt reports associated fatigue and cough that also started 3 weeks ago, noting that walking exacerbates his sx. He states that he is compliant with his 10 mg amlodipine. Pt denies h/o of PE/DVT. Pt denies any chest pain, fevers, or abd pain currently. There are no other acute medical concerns at this time. Social hx: Patient reports daily Tobacco use, noting 0.25 ppd. Reports 12 oz/week EtOH use. Denies illicit drug abuse. PCP: Sondra Herman MD 
Cardiology: Dr. Marianne Garcia MD 
 
Note written by [employer], as dictated by Reymundo Woodall MD 2:38 AM 
 
 
 
The history is provided by the patient. No  was used. Past Medical History:  
Diagnosis Date  Acute CVA (cerebrovascular accident) ([address] 2/15/2017 Per clinical exam  
 Acute CVA (cerebrovascular accident) ([address] 2/15/2017  CAD (coronary artery disease) MI 1995  Carotid artery disease ([address]  Cough  Diplopia 2/14/2017  Fatigue  Headache  History of CVA (cerebrovascular accident) 2/14/2017 CT head: chronic L BG infarct  Hyperlipidemia  Hypertension  Muscle weakness  PVD (peripheral vascular disease) ([facility] Past Surgical History:  
Procedure Laterality Date  CARDIAC SURG PROCEDURE UNLIST 4 cardiac stents, 3 stens to L leg, 2 stens R leg, 1 stent to abdomen  HX MOHS PROCEDURES Right 2015  HX ORTHOPAEDIC Left 2015  
 rotator cuff manipulation  NEUROLOGICAL PROCEDURE UNLISTED    
 EMG Family History:  
Problem Relation Age of Onset  Heart Disease Brother Social History Socioeconomic History  Marital status:  Spouse name: Not on file  Number of children: Not on file  Years of education: Not on file  Highest education level: Not on file Social Needs  Financial resource strain: Not on file  Food insecurity - worry: Not on file  Food insecurity - inability: Not on file  Transportation needs - medical: Not on file  Transportation needs - non-medical: Not on file Occupational History  Not on file Tobacco Use  Smoking status: Current Every Day Smoker Packs/day: 0.25 Types: Cigarettes Last attempt to quit: 2016 Years since quittin.7  Smokeless tobacco: Current User Substance and Sexual Activity  Alcohol use: Yes Alcohol/week: 12.0 oz Types: 24 Cans of beer per week Comment: socially  Drug use: No  
 Sexual activity: Yes Other Topics Concern  Not on file Social History Narrative  Not on file ALLERGIES: Patient has no known allergies. Review of Systems Constitutional: Positive for fatigue. Negative for chills and fever. HENT: Negative for sore throat. Eyes: Negative for pain. Respiratory: Positive for cough and shortness of breath. Cardiovascular: Negative for chest pain. Gastrointestinal: Negative for abdominal pain and vomiting. Genitourinary: Negative for dysuria. Skin: Negative for rash. Neurological: Negative for syncope and headaches. Psychiatric/Behavioral: Negative for confusion. All other systems reviewed and are negative. Vitals:  
 11/17/18 0225 11/17/18 0235 11/17/18 0236 BP: (!) 240/97 (!) 221/96 (!) 221/96 Pulse:  84 87 Resp: 23 Temp: 97.7 °F (36.5 °C) SpO2: 95% Weight: 68.5 kg (151 lb) Height: 5' 9\" (1.753 m) Physical Exam  
Constitutional: He is oriented to person, place, and time. He appears well-developed. No distress. Pt is obese. HENT:  
Head: Normocephalic and atraumatic. Eyes: Conjunctivae and EOM are normal.  
Neck: Normal range of motion. Neck supple. Cardiovascular: Normal rate, regular rhythm and normal heart sounds. Pt has 2+ pitting edema to BLE. Pulmonary/Chest: Effort normal and breath sounds normal. No respiratory distress. Abdominal: Soft. There is no tenderness. There is no guarding. Musculoskeletal: Normal range of motion. He exhibits no edema. Neurological: He is alert and oriented to person, place, and time. He exhibits normal muscle tone. Skin: Skin is warm and dry. Vitals reviewed. Note written by Daphne Prince, as dictated by Fabian Cason MD 2:50 AM  
 
MDM Number of Diagnoses or Management Options Hypertensive urgency, malignant: new and requires workup Critical Care Total time providing critical care: 30-74 minutes (Total critical care time spend exclusive of procedures:  35 minutes. ) Procedures ED EKG interpretation: 
Rhythm: normal sinus rhythm; and regular . Rate (approx.): 84; Axis: normal; likely LVH with repolarization abnormality; no STEMI, normal intervals Note written by Daphne Prince, as dictated by Fabian Cason MD 2:52 AM 
 
 
4:31 AM 
I spoke with Dr. Norbert Adorno, the hospitalist on-call. We discussed the case and he agreed to accept the patient for admission.

## 2018-11-17 NOTE — ED NOTES
TRANSFER - OUT REPORT: 
 
Verbal report given to Jacek Adorno RN on Eric Seymour  being transferred to [facility] for routine progression of care Report consisted of patients Situation, Background, Assessment and  
Recommendations(SBAR). Information from the following report(s) SBAR, Kardex, ED Summary, MAR, Accordion and Recent Results was reviewed with the receiving nurse. Lines:  
Peripheral IV 11/17/18 Left Wrist (Active) Site Assessment Clean, dry, & intact 11/17/2018  2:20 AM  
Phlebitis Assessment 0 11/17/2018  2:20 AM  
Infiltration Assessment 0 11/17/2018  2:20 AM  
Dressing Status Clean, dry, & intact 11/17/2018  2:20 AM  
Dressing Type Tape;Transparent 11/17/2018  2:20 AM  
Hub Color/Line Status Pink;Flushed;Patent 11/17/2018  2:20 AM  
  
 
Opportunity for questions and clarification was provided. Patient transported with: 
 Monitor Registered Nurse

## 2018-11-17 NOTE — PROGRESS NOTES
[facility]: MED RECONCILIATION Comments/Recommendations:  
? Patient is awake, alert, and knowledgeable about home medications ? Verifies no known allergies and preferred pharmacy listed ? Denies missing doses of medication at home ? Reports blood pressure is monitored at home with an average reading of 160/80 ? Prednisone is taken for the patient's kidneys. The patient was initially prescribed prednisone 60 mg daily but he could not take this dose as it was too many pills. The patient took 40 mg daily for two weeks and then 30 mg daily for one week prior to admission. The patient planned to continue the 30 mg daily until follow up with his nephrologist Dr. Marvel Yo. ? Pharmacist reviewed prescription refill history with Rx Query. ? Per Rx Query on 11/14 the patient had spironolactone 25 mg filled for 45 tablets for a 90 day supply. The patient was not aware this prescription was filled and has not picked it up from the pharmacy. The patient had never heard of spironolactone or Aldactone. Medications added: · Prednisone Medications removed: · Aspirin · Robitussin · Diclofenac · Hydrocodone/apap · Labetalol · Losartan · Methocarbamol · ondansetron Medications adjusted: 
 
· None Allergies: Patient has no known allergies. Prior to Admission Medications:  
Prior to Admission Medications Prescriptions Last Dose Informant Patient Reported? Taking? amLODIPine (NORVASC) 10 mg tablet 11/16/2018 at am Self No Yes Sig: Take 1 Tab by mouth daily. atorvastatin (LIPITOR) 80 mg tablet 11/16/2018 at am Self Yes Yes Sig: Take 80 mg by mouth daily. predniSONE (DELTASONE) 10 mg tablet 11/16/2018 at am Self Yes Yes Sig: Take 30 mg by mouth daily. Facility-Administered Medications: None Thank you, Abdirizak Ding, PharmD, BCPS

## 2018-11-17 NOTE — PROGRESS NOTES
SHIFT CHANGE: 
7:50 AM Report received from Chan Ramon RN. SBAR, Kardex, Procedure Summary, Intake/Output, MAR, Recent Results, Med Rec Status and Cardiac Rhythm SB were discussed. SHIFT SUMMARY: 
8:40 AM  Patient has a hacking, non-productive cough. Inspiratory and expiratory wheezing. States the cough started when the prednisone was started. 10:30 AM  Orders received from Dr. Caro Hernandez for Duo-nebs. 1:00 PM  Patient's cough is starting to bother him. Dr. Caro Hernandez notified and orders received for PRN cough syrup (SEE MAR) END OF SHIFT REPORT: 
7:46 PM Bedside and Verbal shift change report given to Chan Ramon RN (oncoming nurse) by Miranda Servin RN (offgoing nurse). Report included the following information SBAR, Kardex, Procedure Summary, Intake/Output, MAR, Recent Results, Med Rec Status and Cardiac Rhythm NSR/SB.

## 2018-11-17 NOTE — H&P
[address] [facility] [address], [place] [address] 
[phone] Admission History and Physical 
 
 
NAME:  Stacey Khan :   1955 MRN:  [id number] PCP:  Dalila Limon MD  
 
Date/Time:  2018 Subjective: CHIEF COMPLAINT: SOB HISTORY OF PRESENT ILLNESS:    
Mr. Manuelito Powers is a 58 y.o.  male who is admitted with hypertensive urgency. Mr. Manuelito Powers presented to the Emergency Department this AM complaining of SOB: for the past 3 weeks, started after he was prescribed prednisone by Dr. Vijay Blair for CKD 3 but he can't remember the underlying cause, worse last night, associated with elevated BP. BP controlled in the ED with oral clonidine and IV labetalol History obtained from chart review and the patient. Previous records reviewed, admit last year for acute CVA and Cardiology clinic visits for CAD Past Medical History:  
Diagnosis Date  Acute CVA (cerebrovascular accident) ([address] 2/15/2017 Per clinical exam  
 Acute CVA (cerebrovascular accident) ([address] 2/15/2017  CAD (coronary artery disease) MI   Carotid artery disease ([address]  Cough  Diplopia 2017  Fatigue  Headache  History of CVA (cerebrovascular accident) 2017 CT head: chronic L BG infarct  Hyperlipidemia  Hypertension  Muscle weakness  PVD (peripheral vascular disease) ([address] Past Surgical History:  
Procedure Laterality Date  CARDIAC SURG PROCEDURE UNLIST 4 cardiac stents, 3 stens to L leg, 2 stens R leg, 1 stent to abdomen  HX MOHS PROCEDURES Right 2015  HX ORTHOPAEDIC Left 2015  
 rotator cuff manipulation  NEUROLOGICAL PROCEDURE UNLISTED    
 EMG Social History Tobacco Use  Smoking status: Current Every Day Smoker Packs/day: 0.25 Types: Cigarettes Last attempt to quit: 2016 Years since quittin.7  Smokeless tobacco: Current User Substance Use Topics  Alcohol use: Yes Alcohol/week: 12.0 oz Types: 24 Cans of beer per week Comment: socially Family History Problem Relation Age of Onset  Heart Disease Brother No Known Allergies Prior to Admission medications Medication Sig Start Date End Date Taking? Authorizing Provider  
amLODIPine (NORVASC) 10 mg tablet Take 1 Tab by mouth daily. 2/15/17   Zuri Singleton MD  
aspirin 81 mg chewable tablet Take 1 Tab by mouth daily. 2/15/17   Zuri Singleton MD  
atorvastatin (LIPITOR) 80 mg tablet Take 1 Tab by mouth nightly. 2/15/17   Zuri Singleton MD  
HYDROcodone-acetaminophen (NORCO) 5-325 mg per tablet Take 1 Tab by mouth every four (4) hours as needed. Max Daily Amount: 6 Tabs. 2/15/17   Rosalio Singleton MD  
labetalol (NORMODYNE) 100 mg tablet Take 1 Tab by mouth every twelve (12) hours. 2/15/17   Zuri Singleton MD  
losartan (COZAAR) 25 mg tablet Take 1 Tab by mouth daily. 2/15/17   Rosalio Singleton MD  
ondansetron (ZOFRAN ODT) 4 mg disintegrating tablet Take 1 Tab by mouth every eight (8) hours as needed for Nausea. 2/15/17   Zuri Singleton MD  
diclofenac EC (VOLTAREN) 75 mg EC tablet Take 75 mg by mouth two (2) times daily as needed. Provider, Historical  
methocarbamol (ROBAXIN) 500 mg tablet Take 500 mg by mouth nightly as needed (muscle pain). Provider, Historical  
dextromethorphan-guaiFENesin (ROBITUSSIN-DM)  mg/5 mL syrup Take 10 mL by mouth every four (4) hours as needed for Cough. Provider, Historical  
 
 
 
Review of Systems: 
(bold if positive, if negative) Gen:  fatigueEyes:  ENT:  CVS:  Pulm:  CoughdyspneaGI:   
:   
MS:  Skin:  Psych:  Endo:   
Hem:  Renal:   
Neuro:    
 
 
  
Objective: VITALS:   
Vital signs reviewed; most recent are: 
 
Visit Vitals /54 Pulse (!) 56 Temp 97.7 °F (36.5 °C) Resp 16 Ht 5' 9\" (1.753 m) Wt 68.5 kg (151 lb) SpO2 (!) 89% BMI 22.30 kg/m² SpO2 Readings from Last 6 Encounters: 11/17/18 (!) 89% 02/15/17 94% 06/12/15 98% 06/08/15 100% 04/10/15 97% No intake or output data in the 24 hours ending 11/17/18 0457 Exam:  
 
Physical Exam: 
 
Gen: Well-developed, well-nourished, in no acute distress HEENT:  Pink conjunctivae, PERRL, hearing intact to voice, moist mucous membranes Neck: Supple, without masses, thyroid non-tender Resp: No accessory muscle use, clear breath sounds without wheezes rales or rhonchi 
Card: No murmurs, normal S1, S2 without thrills, bruits; 2-3+ on right, 1-2+ on left peripheral edema Abd:  Soft, non-tender, non-distended, normoactive bowel sounds are present, no palpable organomegaly and no detectable hernias Lymph:  No cervical or inguinal adenopathy Musc: No cyanosis or clubbing Skin: No rashes or ulcers, skin turgor is good Neuro:  Cranial nerves are grossly intact, no focal motor weakness, follows commands appropriately Psych:  Good insight, oriented to person, place and time, alert Labs: 
 
Recent Labs 11/17/18 
0243 WBC 16.0*  
HGB 12.9 HCT 40.1  Recent Labs 11/17/18 
0243   
K 3.8  CO2 30 * BUN 33* CREA 1.55* CA 8.1* ALB 2.8* TBILI 0.6 SGOT 18 ALT 34 Lab Results Component Value Date/Time Glucose (POC) 115 (H) 09/05/2010 08:30 AM  
 
No results for input(s): PH, PCO2, PO2, HCO3, FIO2 in the last 72 hours. No results for input(s): INR in the last 72 hours. No lab exists for component: INREXT, INREXT Additional testing:  Chest X-ray: no acute process, visualized by me. Results not reviewed with Radiologist. 
 
  
Assessment/Plan:   
  
Principal Problem: Hypertensive urgency (11/17/2018) - BP came down with oral and IV meds 
 - start scheduled clonidine 
 - resume home BP meds when confirmed by Pharmacy 
 - follow BP with PRN labetalol for now Active Problems: 
  CAD (coronary artery disease) () - elevated troponin today is likely due to stress from elevated BP 
 - trend troponin and obtain echocardiogram 
 
  Elevated serum creatinine (11/17/2018) - creatinine up from prior baseline but now followed by Dr. Vijay Blair and started on prednisone for unclear renal diagnosis 
 - follow creatinine with BP control 
 - consult Renal 
 
  Hyperglycemia (11/17/2018) - check A1c Edema 
 - given asymmetry and unclear renal diagnosis (nephrosis?) will get LE venous doppler Code status: 
 - patient is FULL CODE, no AMD on file, does not have an official surrogate appointed and does not want to appoint one now Total time spent on patient care: 70 Minutes Care Plan discussed with: Patient and Dr. Juan F Schneider Discussed:  Code Status, Care Plan and D/C Planning Prophylaxis:  Lovenox and SCD's 
 
Probable Disposition:  Home w/Family 
        
___________________________________________________ Attending Physician: Chari Mcrae MD

## 2018-11-17 NOTE — PROGRESS NOTES
Case d/w Dr. Luisa Ashraf. Chart reviewed. Patient examined at bedside, with Dr. Savita Conti. Resume prednisone, to dose at 20mg TID per nephrology Diuresing on Bumex. Resume Norvasc at lower dose. Stop clonidine. Follow BMP closely. Check LE dopplers to r/o DVT. Trend cardiac enzymes, cards consult. TTE Additional 20 minutes spent. Tobacco abuse: smokes 2 packs per day. Recommended smoking cessation. Discussed possible pharmacotherapy including nicotine replacement therapy, Chantix/Wellbutrin. Provided number for 1-800-QUIT-NOW. I spent ~4 minutes (outside of the time documented for this note) exclusively focused on tobacco cessation counseling

## 2018-11-17 NOTE — CONSULTS
[address]  YOB: 1955     Assessment & Plan:   1. CKD 3/ Presumtive FSGS ( Biopsty 1 glm)  · Severe Proteinuria: 10 gm  · Prednisone : prescribed 60,taking 40, resume 20 mg tid  · Cr 1.6-1.9 mg per out pt labs  · We will dw DR. Aung Abbott about PCP prophylaxis and GI prophylaxis  2. HTN urgency  · Amlodipine out pt  · Due to edema,reduce to 5 mg  · Add loops  · May add Losartan  · ACE Caused cough  3. Edema  · CKD,Proteinuria,ccb  · Duplex  · Diuretics  4. Smoker          Subjective:   CHIEF COMPLAIN:  HPI:Mr. Nirmal Lopez is a 58 y.o.  male who is admitted with hypertensive urgency. We are seeing him for [facility]. I rev his chart. He has biopsy proven FSGS( Though only one glome): pred 60 mg daily, but was taking 40 mg only. He has severe edema  He takes NSAIDs rarely. His BP > 200 on arrival, better now  He is current smoker  He denies new meds  Cr is stable. Serum albumin is low. K is ok. Review of Systems  A comprehensive review of systems was negative except for that written in the HPI.     Past Medical History:   Diagnosis Date    Acute CVA (cerebrovascular accident) ([address] 2/15/2017    Per clinical exam    Acute CVA (cerebrovascular accident) ([address] 2/15/2017    CAD (coronary artery disease)     MI 1995    Carotid artery disease ([address]     Cough     Diplopia 2/14/2017    Fatigue     Headache     History of CVA (cerebrovascular accident) 2/14/2017    CT head: chronic L BG infarct    Hyperlipidemia     Hypertension     Muscle weakness     PVD (peripheral vascular disease) ([address]       Past Surgical History:   Procedure Laterality Date    CARDIAC SURG PROCEDURE UNLIST      4 cardiac stents, 3 stens to L leg, 2 stens R leg, 1 stent to abdomen    HX MOHS PROCEDURES Right 1/2015    HX ORTHOPAEDIC Left 4/2015    rotator cuff manipulation    NEUROLOGICAL PROCEDURE UNLISTED      EMG       Social History Socioeconomic History    Marital status:      Spouse name: Not on file    Number of children: Not on file    Years of education: Not on file    Highest education level: Not on file   Social Needs    Financial resource strain: Not on file    Food insecurity - worry: Not on file    Food insecurity - inability: Not on file    Transportation needs - medical: Not on file   [employer] needs - non-medical: Not on file   Occupational History    Not on file   Tobacco Use    Smoking status: Current Every Day Smoker     Packs/day: 0.25     Types: Cigarettes     Last attempt to quit: 2016     Years since quittin.7    Smokeless tobacco: Current User   Substance and Sexual Activity    Alcohol use: Yes     Alcohol/week: 12.0 oz     Types: 24 Cans of beer per week     Comment: socially    Drug use: No    Sexual activity: Yes   Other Topics Concern    Not on file   Social History Narrative    Not on file      Family History   Problem Relation Age of Onset    Heart Disease Brother       Prior to Admission medications    Medication Sig Start Date End Date Taking? Authorizing Provider   amLODIPine (NORVASC) 10 mg tablet Take 1 Tab by mouth daily. 2/15/17   Edna Singleton MD   aspirin 81 mg chewable tablet Take 1 Tab by mouth daily. 2/15/17   Edna Singleton MD   atorvastatin (LIPITOR) 80 mg tablet Take 1 Tab by mouth nightly. 2/15/17   Edna Signleton MD   HYDROcodone-acetaminophen (NORCO) 5-325 mg per tablet Take 1 Tab by mouth every four (4) hours as needed. Max Daily Amount: 6 Tabs. 2/15/17   Rosalio Singleton MD   labetalol (NORMODYNE) 100 mg tablet Take 1 Tab by mouth every twelve (12) hours. 2/15/17   Edna Singleton MD   losartan (COZAAR) 25 mg tablet Take 1 Tab by mouth daily. 2/15/17   Rosalio Singleton MD   ondansetron (ZOFRAN ODT) 4 mg disintegrating tablet Take 1 Tab by mouth every eight (8) hours as needed for Nausea.  2/15/17   Edna Singleton MD   diclofenac EC (VOLTAREN) 75 mg EC tablet Take 75 mg by mouth two (2) times daily as needed. Provider, Historical   methocarbamol (ROBAXIN) 500 mg tablet Take 500 mg by mouth nightly as needed (muscle pain). Provider, Historical   dextromethorphan-guaiFENesin (ROBITUSSIN-DM)  mg/5 mL syrup Take 10 mL by mouth every four (4) hours as needed for Cough. Provider, Historical     No Known Allergies    Objective:     Vitals:  Blood pressure 155/72, pulse 60, temperature 97.8 °F (36.6 °C), resp. rate 15, height 5' 9\" (1.753 m), weight 72.5 kg (159 lb 13.3 oz), SpO2 97 %. Temp (24hrs), Av.7 °F (36.5 °C), Min:97.5 °F (36.4 °C), Max:97.9 °F (36.6 °C)      Intake and Output:  No intake/output data recorded. No intake/output data recorded. Physical Exam:                Patient is intubated:  no    Physical Examination:   GENERAL ASSESSMENT: anxious  SKIN: dry skin  HEAD: normocephalic  EYES: normal eyes  NECK: normal  CHEST: normal air exchange, rhonchi bl, wheezes bl  HEART: regular rate and rhythm, normal S1/S2  ABDOMEN: soft, non-distended, no masses  :Maxwell: no  EXTREMITY: 2 edema  NEURO: gross motor exam normal by observation      ECG/rhythm[de-identified] Rev:yes  Xray/CT/US/MRI REV:yes  Data Review   Recent Results (from the past 72 hour(s))   CBC WITH AUTOMATED DIFF    Collection Time: 18  2:43 AM   Result Value Ref Range    WBC 16.0 (H) 4.1 - 11.1 K/uL    RBC 4.44 4.10 - 5.70 M/uL    HGB 12.9 12.1 - 17.0 g/dL    HCT 40.1 36.6 - 50.3 %    MCV 90.3 80.0 - 99.0 FL    MCH 29.1 26.0 - 34.0 PG    MCHC 32.2 30.0 - 36.5 g/dL    RDW 14.7 (H) 11.5 - 14.5 %    PLATELET 921 393 - 814 K/uL    MPV 9.8 8.9 - 12.9 FL    NRBC 0.0 0  WBC    ABSOLUTE NRBC 0.00 0.00 - 0.01 K/uL    NEUTROPHILS 67 32 - 75 %    LYMPHOCYTES 23 12 - 49 %    MONOCYTES 7 5 - 13 %    EOSINOPHILS 2 0 - 7 %    BASOPHILS 0 0 - 1 %    IMMATURE GRANULOCYTES 1 (H) 0.0 - 0.5 %    ABS. NEUTROPHILS 10.8 (H) 1.8 - 8.0 K/UL    ABS. LYMPHOCYTES 3.7 (H) 0.8 - 3.5 K/UL    ABS.  MONOCYTES 1.2 (H) 0.0 - 1.0 K/UL ABS. EOSINOPHILS 0.3 0.0 - 0.4 K/UL    ABS. BASOPHILS 0.0 0.0 - 0.1 K/UL    ABS. IMM. GRANS. 0.1 (H) 0.00 - 0.04 K/UL    DF AUTOMATED     METABOLIC PANEL, COMPREHENSIVE    Collection Time: 11/17/18  2:43 AM   Result Value Ref Range    Sodium 142 136 - 145 mmol/L    Potassium 3.8 3.5 - 5.1 mmol/L    Chloride 104 97 - 108 mmol/L    CO2 30 21 - 32 mmol/L    Anion gap 8 5 - 15 mmol/L    Glucose 174 (H) 65 - 100 mg/dL    BUN 33 (H) 6 - 20 MG/DL    Creatinine 1.55 (H) 0.70 - 1.30 MG/DL    BUN/Creatinine ratio 21 (H) 12 - 20      GFR est AA 55 (L) >60 ml/min/1.73m2    GFR est non-AA 45 (L) >60 ml/min/1.73m2    Calcium 8.1 (L) 8.5 - 10.1 MG/DL    Bilirubin, total 0.6 0.2 - 1.0 MG/DL    ALT (SGPT) 34 12 - 78 U/L    AST (SGOT) 18 15 - 37 U/L    Alk.  phosphatase 115 45 - 117 U/L    Protein, total 6.0 (L) 6.4 - 8.2 g/dL    Albumin 2.8 (L) 3.5 - 5.0 g/dL    Globulin 3.2 2.0 - 4.0 g/dL    A-G Ratio 0.9 (L) 1.1 - 2.2     NT-PRO BNP    Collection Time: 11/17/18  2:43 AM   Result Value Ref Range    NT pro-BNP 6,183 (H) 0 - 125 PG/ML   TROPONIN I    Collection Time: 11/17/18  2:43 AM   Result Value Ref Range    Troponin-I, Qt. 0.11 (H) <0.05 ng/mL   EKG, 12 LEAD, INITIAL    Collection Time: 11/17/18  2:52 AM   Result Value Ref Range    Ventricular Rate 84 BPM    Atrial Rate 84 BPM    P-R Interval 198 ms    QRS Duration 120 ms    Q-T Interval 396 ms    QTC Calculation (Bezet) 467 ms    Calculated R Axis 5 degrees    Calculated T Axis 86 degrees    Diagnosis       Normal sinus rhythm  Left ventricular hypertrophy with QRS widening and repolarization abnormality  Abnormal ECG  When compared with ECG of 14-FEB-2017 09:11,  Non-specific change in ST segment in Lateral leads     SAMPLES BEING HELD    Collection Time: 11/17/18  2:54 AM   Result Value Ref Range    SAMPLES BEING HELD SST,RD,MIGUEL     COMMENT        Add-on orders for these samples will be processed based on acceptable specimen integrity and analyte stability, which may vary by analyte. Discussed with:    Patient and Attending/Consulting  Thank you so much to allow us to participate in this patient's care. We will follow.  : Candiss Goodell, MD  11/17/2018      [facility]:  [email or web address]  Evette Dos Santos office:  [address], [address] [place] [address],[address]  [place], [address] [place] [address]  Phone: [phone]  Fax :     [phone]    [facility] office:  [address] [facility], [address] [facility]  Phone - [phone]  Fax - [phone]

## 2018-11-17 NOTE — PROCEDURES
[facility]  *** FINAL REPORT ***    Name: Ba Hernandez  MRN: [id number]    Inpatient  : 22 Aug 1955  HIS Order #: [id number]  [id number] [facility] Visit #: [id number]  Date: 2018    TYPE OF TEST: Peripheral Venous Testing    REASON FOR TEST  Pain in limb, Limb swelling    Right Leg:-  Deep venous thrombosis:           No  Superficial venous thrombosis:    No  Deep venous insufficiency:        No  Superficial venous insufficiency: No    Left Leg:-  Deep venous thrombosis:           No  Superficial venous thrombosis:    No  Deep venous insufficiency:        No  Superficial venous insufficiency: No      INTERPRETATION/FINDINGS  PROCEDURE:  BILATERAL LE VENOUS DUPLEX. Evaluation of lower extremity veins with ultrasound (B-mode imaging,  pulsed Doppler, color Doppler). Includes the common femoral, deep  femoral, femoral, popliteal, posterior tibial, peroneal, and great  saphenous veins. FINDINGS:  Foster Pillar scale and color flow duplex images of the veins in  both lower extremities demonstrate normal compressibility, spontaneous   and augmented flow profiles, and absence of filling defects  throughout the deep and superficial veins in both lower extremities. CONCLUSION: Bilateral lower extremity venous duplex negative for deep  venous thrombosis or thrombophlebitis. ADDITIONAL COMMENTS    I have personally reviewed the data relevant to the interpretation of  this  study. TECHNOLOGIST: Jd Chiang  Signed: 2018 11:09 AM    PHYSICIAN: Vilma Jacobo.  Mingo Cruz MD  Signed: 2018 09:27 AM

## 2018-11-17 NOTE — ED NOTES
Pt arrives to ED via EMS. Pt woke up with chest tightness, took 4 nitro sublingual before he called EMS per pt. When EMS arrived, pt c/o SOB, high BP, and wheezing. Duo neb treatment x 1 given en route per EMS.

## 2018-11-17 NOTE — PROGRESS NOTES
TRANSFER - IN REPORT: 
 
Verbal report received from Ei Ei (name) on Yodit Darden  being received from ED (unit) for routine progression of care Report consisted of patients Situation, Background, Assessment and  
Recommendations(SBAR). Information from the following report(s) SBAR, Kardex, ED Summary, Procedure Summary, Intake/Output, MAR, Accordion, Recent Results, Med Rec Status and Cardiac Rhythm SB was reviewed with the receiving nurse. Opportunity for questions and clarification was provided. Awaiting patient arrival.  
 
0630: Patient arrived. Vitals obtained. 0700: Bedside and Verbal shift change report given to Manuela (oncoming nurse) by Bradford Sanchez (offgoing nurse).  Report included the following information SBAR, Kardex, ED Summary, Intake/Output, MAR, Accordion, Recent Results, Med Rec Status and Cardiac Rhythm SB.

## 2018-11-18 LAB
ANION GAP SERPL CALC-SCNC: 10 MMOL/L (ref 5–15)
BUN SERPL-MCNC: 38 MG/DL (ref 6–20)
BUN/CREAT SERPL: 22 (ref 12–20)
CALCIUM SERPL-MCNC: 8.3 MG/DL (ref 8.5–10.1)
CHLORIDE SERPL-SCNC: 104 MMOL/L (ref 97–108)
CK MB CFR SERPL CALC: 7.8 % (ref 0–2.5)
CK MB SERPL-MCNC: 3.5 NG/ML (ref 5–25)
CK SERPL-CCNC: 45 U/L (ref 39–308)
CO2 SERPL-SCNC: 26 MMOL/L (ref 21–32)
CREAT SERPL-MCNC: 1.72 MG/DL (ref 0.7–1.3)
ERYTHROCYTE [DISTWIDTH] IN BLOOD BY AUTOMATED COUNT: 14.9 % (ref 11.5–14.5)
EST. AVERAGE GLUCOSE BLD GHB EST-MCNC: 148 MG/DL
GLUCOSE BLD STRIP.AUTO-MCNC: 200 MG/DL (ref 65–100)
GLUCOSE BLD STRIP.AUTO-MCNC: 280 MG/DL (ref 65–100)
GLUCOSE SERPL-MCNC: 155 MG/DL (ref 65–100)
HBA1C MFR BLD: 6.8 % (ref 4.2–6.3)
HCT VFR BLD AUTO: 40.4 % (ref 36.6–50.3)
HGB BLD-MCNC: 13.2 G/DL (ref 12.1–17)
MAGNESIUM SERPL-MCNC: 1.9 MG/DL (ref 1.6–2.4)
MCH RBC QN AUTO: 29.7 PG (ref 26–34)
MCHC RBC AUTO-ENTMCNC: 32.7 G/DL (ref 30–36.5)
MCV RBC AUTO: 91 FL (ref 80–99)
NRBC # BLD: 0 K/UL (ref 0–0.01)
NRBC BLD-RTO: 0 PER 100 WBC
PHOSPHATE SERPL-MCNC: 3.4 MG/DL (ref 2.6–4.7)
PLATELET # BLD AUTO: 192 K/UL (ref 150–400)
PMV BLD AUTO: 10.3 FL (ref 8.9–12.9)
POTASSIUM SERPL-SCNC: 4.3 MMOL/L (ref 3.5–5.1)
RBC # BLD AUTO: 4.44 M/UL (ref 4.1–5.7)
SERVICE CMNT-IMP: ABNORMAL
SERVICE CMNT-IMP: ABNORMAL
SODIUM SERPL-SCNC: 140 MMOL/L (ref 136–145)
TROPONIN I SERPL-MCNC: 0.07 NG/ML
WBC # BLD AUTO: 14.2 K/UL (ref 4.1–11.1)

## 2018-11-18 PROCEDURE — 94640 AIRWAY INHALATION TREATMENT: CPT

## 2018-11-18 PROCEDURE — 74011250636 HC RX REV CODE- 250/636: Performed by: INTERNAL MEDICINE

## 2018-11-18 PROCEDURE — 83735 ASSAY OF MAGNESIUM: CPT

## 2018-11-18 PROCEDURE — 74011250637 HC RX REV CODE- 250/637: Performed by: INTERNAL MEDICINE

## 2018-11-18 PROCEDURE — 74011636637 HC RX REV CODE- 636/637: Performed by: INTERNAL MEDICINE

## 2018-11-18 PROCEDURE — 82962 GLUCOSE BLOOD TEST: CPT

## 2018-11-18 PROCEDURE — 65660000000 HC RM CCU STEPDOWN

## 2018-11-18 PROCEDURE — 96374 THER/PROPH/DIAG INJ IV PUSH: CPT

## 2018-11-18 PROCEDURE — 74011000250 HC RX REV CODE- 250: Performed by: INTERNAL MEDICINE

## 2018-11-18 PROCEDURE — 84484 ASSAY OF TROPONIN QUANT: CPT

## 2018-11-18 PROCEDURE — 83036 HEMOGLOBIN GLYCOSYLATED A1C: CPT

## 2018-11-18 PROCEDURE — 84100 ASSAY OF PHOSPHORUS: CPT

## 2018-11-18 PROCEDURE — 74011250637 HC RX REV CODE- 250/637: Performed by: SPECIALIST

## 2018-11-18 PROCEDURE — 82550 ASSAY OF CK (CPK): CPT

## 2018-11-18 PROCEDURE — 80048 BASIC METABOLIC PNL TOTAL CA: CPT

## 2018-11-18 PROCEDURE — 36415 COLL VENOUS BLD VENIPUNCTURE: CPT

## 2018-11-18 PROCEDURE — 85027 COMPLETE CBC AUTOMATED: CPT

## 2018-11-18 RX ORDER — CARVEDILOL 6.25 MG/1
12.5 TABLET ORAL 2 TIMES DAILY WITH MEALS
Status: DISCONTINUED | OUTPATIENT
Start: 2018-11-18 | End: 2018-11-19

## 2018-11-18 RX ORDER — CLONIDINE HYDROCHLORIDE 0.1 MG/1
0.1 TABLET ORAL
Status: COMPLETED | OUTPATIENT
Start: 2018-11-18 | End: 2018-11-18

## 2018-11-18 RX ORDER — IPRATROPIUM BROMIDE AND ALBUTEROL SULFATE 2.5; .5 MG/3ML; MG/3ML
3 SOLUTION RESPIRATORY (INHALATION)
Status: DISCONTINUED | OUTPATIENT
Start: 2018-11-18 | End: 2018-11-19

## 2018-11-18 RX ORDER — MAGNESIUM SULFATE 100 %
4 CRYSTALS MISCELLANEOUS AS NEEDED
Status: DISCONTINUED | OUTPATIENT
Start: 2018-11-18 | End: 2018-11-21 | Stop reason: HOSPADM

## 2018-11-18 RX ORDER — GUAIFENESIN 100 MG/5ML
81 LIQUID (ML) ORAL DAILY
Status: DISCONTINUED | OUTPATIENT
Start: 2018-11-19 | End: 2018-11-18

## 2018-11-18 RX ORDER — HYDRALAZINE HYDROCHLORIDE 25 MG/1
25 TABLET, FILM COATED ORAL 3 TIMES DAILY
Status: DISCONTINUED | OUTPATIENT
Start: 2018-11-18 | End: 2018-11-18

## 2018-11-18 RX ORDER — DIPHENHYDRAMINE HCL 25 MG
25 CAPSULE ORAL
Status: DISCONTINUED | OUTPATIENT
Start: 2018-11-18 | End: 2018-11-21 | Stop reason: HOSPADM

## 2018-11-18 RX ORDER — DEXTROSE 50 % IN WATER (D50W) INTRAVENOUS SYRINGE
12.5-25 AS NEEDED
Status: DISCONTINUED | OUTPATIENT
Start: 2018-11-18 | End: 2018-11-21 | Stop reason: HOSPADM

## 2018-11-18 RX ORDER — INSULIN LISPRO 100 [IU]/ML
INJECTION, SOLUTION INTRAVENOUS; SUBCUTANEOUS
Status: DISCONTINUED | OUTPATIENT
Start: 2018-11-18 | End: 2018-11-20

## 2018-11-18 RX ORDER — CLONIDINE HYDROCHLORIDE 0.1 MG/1
0.1 TABLET ORAL 2 TIMES DAILY
Status: DISCONTINUED | OUTPATIENT
Start: 2018-11-19 | End: 2018-11-19

## 2018-11-18 RX ORDER — GUAIFENESIN 100 MG/5ML
81 LIQUID (ML) ORAL DAILY
Status: DISCONTINUED | OUTPATIENT
Start: 2018-11-18 | End: 2018-11-21 | Stop reason: HOSPADM

## 2018-11-18 RX ORDER — HYDRALAZINE HYDROCHLORIDE 25 MG/1
50 TABLET, FILM COATED ORAL 3 TIMES DAILY
Status: DISCONTINUED | OUTPATIENT
Start: 2018-11-19 | End: 2018-11-18

## 2018-11-18 RX ORDER — AMLODIPINE BESYLATE 5 MG/1
10 TABLET ORAL DAILY
Status: DISCONTINUED | OUTPATIENT
Start: 2018-11-18 | End: 2018-11-21 | Stop reason: HOSPADM

## 2018-11-18 RX ORDER — HYDRALAZINE HYDROCHLORIDE 20 MG/ML
20 INJECTION INTRAMUSCULAR; INTRAVENOUS
Status: DISCONTINUED | OUTPATIENT
Start: 2018-11-18 | End: 2018-11-21 | Stop reason: HOSPADM

## 2018-11-18 RX ADMIN — CARVEDILOL 12.5 MG: 6.25 TABLET, FILM COATED ORAL at 08:05

## 2018-11-18 RX ADMIN — CARVEDILOL 12.5 MG: 6.25 TABLET, FILM COATED ORAL at 17:33

## 2018-11-18 RX ADMIN — NITROGLYCERIN 1 INCH: 20 OINTMENT TOPICAL at 20:19

## 2018-11-18 RX ADMIN — Medication 5 ML: at 15:33

## 2018-11-18 RX ADMIN — ENOXAPARIN SODIUM 40 MG: 40 INJECTION SUBCUTANEOUS at 08:05

## 2018-11-18 RX ADMIN — HYDRALAZINE HYDROCHLORIDE 25 MG: 25 TABLET, FILM COATED ORAL at 21:51

## 2018-11-18 RX ADMIN — IPRATROPIUM BROMIDE AND ALBUTEROL SULFATE 3 ML: .5; 3 SOLUTION RESPIRATORY (INHALATION) at 01:27

## 2018-11-18 RX ADMIN — IPRATROPIUM BROMIDE AND ALBUTEROL SULFATE 3 ML: .5; 3 SOLUTION RESPIRATORY (INHALATION) at 23:08

## 2018-11-18 RX ADMIN — IPRATROPIUM BROMIDE AND ALBUTEROL SULFATE 3 ML: .5; 3 SOLUTION RESPIRATORY (INHALATION) at 13:33

## 2018-11-18 RX ADMIN — ASPIRIN 81 MG 81 MG: 81 TABLET ORAL at 13:07

## 2018-11-18 RX ADMIN — GUAIFENESIN AND CODEINE PHOSPHATE 5 ML: 100; 10 SOLUTION ORAL at 13:12

## 2018-11-18 RX ADMIN — HYDRALAZINE HYDROCHLORIDE 25 MG: 25 TABLET, FILM COATED ORAL at 17:33

## 2018-11-18 RX ADMIN — INSULIN LISPRO 5 UNITS: 100 INJECTION, SOLUTION INTRAVENOUS; SUBCUTANEOUS at 17:39

## 2018-11-18 RX ADMIN — ZOLPIDEM TARTRATE 5 MG: 5 TABLET ORAL at 00:24

## 2018-11-18 RX ADMIN — HYDRALAZINE HYDROCHLORIDE 10 MG: 20 INJECTION INTRAMUSCULAR; INTRAVENOUS at 00:15

## 2018-11-18 RX ADMIN — GUAIFENESIN AND CODEINE PHOSPHATE 5 ML: 100; 10 SOLUTION ORAL at 08:05

## 2018-11-18 RX ADMIN — PREDNISONE 20 MG: 20 TABLET ORAL at 08:04

## 2018-11-18 RX ADMIN — CLONIDINE HYDROCHLORIDE 0.1 MG: 0.1 TABLET ORAL at 03:18

## 2018-11-18 RX ADMIN — IPRATROPIUM BROMIDE AND ALBUTEROL SULFATE 3 ML: .5; 3 SOLUTION RESPIRATORY (INHALATION) at 19:29

## 2018-11-18 RX ADMIN — METHYLPREDNISOLONE SODIUM SUCCINATE 60 MG: 40 INJECTION, POWDER, FOR SOLUTION INTRAMUSCULAR; INTRAVENOUS at 23:13

## 2018-11-18 RX ADMIN — AMLODIPINE BESYLATE 10 MG: 5 TABLET ORAL at 08:04

## 2018-11-18 RX ADMIN — ZOLPIDEM TARTRATE 5 MG: 5 TABLET ORAL at 23:13

## 2018-11-18 RX ADMIN — INSULIN HUMAN 6 UNITS: 100 INJECTION, SUSPENSION SUBCUTANEOUS at 17:38

## 2018-11-18 RX ADMIN — INSULIN LISPRO 3 UNITS: 100 INJECTION, SOLUTION INTRAVENOUS; SUBCUTANEOUS at 21:57

## 2018-11-18 RX ADMIN — IPRATROPIUM BROMIDE AND ALBUTEROL SULFATE 3 ML: .5; 3 SOLUTION RESPIRATORY (INHALATION) at 15:25

## 2018-11-18 RX ADMIN — Medication 10 ML: at 21:58

## 2018-11-18 RX ADMIN — METHYLPREDNISOLONE SODIUM SUCCINATE 60 MG: 40 INJECTION, POWDER, FOR SOLUTION INTRAMUSCULAR; INTRAVENOUS at 17:34

## 2018-11-18 RX ADMIN — IPRATROPIUM BROMIDE AND ALBUTEROL SULFATE 3 ML: .5; 3 SOLUTION RESPIRATORY (INHALATION) at 07:23

## 2018-11-18 RX ADMIN — BUMETANIDE 2 MG: 1 TABLET ORAL at 08:04

## 2018-11-18 RX ADMIN — PREDNISONE 20 MG: 20 TABLET ORAL at 13:07

## 2018-11-18 RX ADMIN — ATORVASTATIN CALCIUM 80 MG: 20 TABLET, FILM COATED ORAL at 21:52

## 2018-11-18 RX ADMIN — GUAIFENESIN AND CODEINE PHOSPHATE 5 ML: 100; 10 SOLUTION ORAL at 17:42

## 2018-11-18 NOTE — PROCEDURES
[facility]  *** FINAL REPORT ***    Name: Iesha Harrison  MRN: [id number]    Inpatient  : 22 Aug 1955  HIS Order #: [id number]  [id number] [facility] Visit #: [id number]  Date: 2018    TYPE OF TEST: Cerebrovascular Duplex    REASON FOR TEST  Carotid bruit    Right Carotid:-             Proximal               Mid                 Distal  cm/s  Systolic  Diastolic  Systolic  Diastolic  Systolic  Diastolic  CCA:     07.7      11.0                            62.5      11.0  Bulb:  ECA:    329.2      29.0  ICA:    105.9      22.3      216.2      51.8      129.3      28.8  ICA/CCA:  1.7       2.0    ICA Stenosis: 50-69%    Right Vertebral:-  Finding: Antegrade  Sys:      115.5  Rosemary:       24.8    Right Subclavian:    Left Carotid:-            Proximal                Mid                 Distal  cm/s  Systolic  Diastolic  Systolic  Diastolic  Systolic  Diastolic  CCA:     28.9       6.4                            45.1       9.5  Bulb:  ECA:     88.2       9.0  ICA:     76.4      16.2      131.3      34.7       92.3      22.1  ICA/CCA:  1.7       1.7    ICA Stenosis: <50%    Left Vertebral:-  Finding: Antegrade  Sys:       62.2  Rosemary:       12.7    Left Subclavian:    INTERPRETATION/FINDINGS  PROCEDURE:  Evaluation of the extracranial cerebrovascular arteries  with ultrasound (B-mode imaging, pulsed Doppler, color Doppler). Includes the common carotid, internal carotid, external carotid, and  vertebral arteries. FINDINGS:  RIGHT: Irregular heterogeneous plaque noted in the distal CCA, carotid   bulb, proximal ECA with velocities of PSV of 329.2 and the proximal  ICA with no hemodynamic changes. Tortuous segment of the mid ICA  identified with elevated velocities of 216.2. LEFT: Diffuse complex plaque was seen in the proximal CCA, distal CCA,   carotid bulb, proximal eca, and proximal ICA with no hemodynamic  changes.     IMPRESSION: Findings are consistent with 50-69% stenosis of the right  mid internal carotid (tortuosity)  and 0-49%stenosis of the left  internal carotid. Vertebrals are patent with antegrade flow. NOTE:  Hemodynamically significant stenosis identified at the proximal right  ECA. ADDITIONAL COMMENTS    I have personally reviewed the data relevant to the interpretation of  this  study. TECHNOLOGIST: Van Hooper. Андрей  Signed: 11/17/2018 07:57 PM    PHYSICIAN: Alonzo Patel.  Vimal White MD  Signed: 11/19/2018 09:27 AM

## 2018-11-18 NOTE — PROGRESS NOTES
Sharie Romberg, MD. [facility] Patient: Sandro Guerra : 1955 Today's Date: 2018 CARDIOLOGY PROGRESS NOTE 
S: Still SOB. Coughing. Not any better. Had some brief CP after a coughing spell earlier. O: 
Visit Vitals /55 (BP 1 Location: Right arm, BP Patient Position: At rest) Pulse 69 Temp 97.4 °F (36.3 °C) Resp 16 Ht 5' 9\" (1.753 m) Wt 156 lb 8 oz (71 kg) SpO2 95% BMI 23.11 kg/m² Patient in NAD HEENT:  Hearing intact, non-icteric, normocephalic, atraumatic. Neck Exam: Supple, No clear JVD sitting up, + bilat bruits Lung Exam: + mild exp wheezing, no crackles Cardiac Exam: Regular rate and rhythm with no murmur - distant Abdomen: Soft, non-tender, obese . Extremities: Moves all ext well. Mild left lower extremity edema. Psych: Appropriate affect Neuro - Grossly intact Review of Symptoms: 
Constitutional: Negative for fever, chills HEENT: Negative for nosebleeds, tinnitus, and vision changes. Respiratory: + FIGUEROA, cough Cardiovascular: + PND, intermittent chest pain Gastrointestinal: Negative for  melena. Genitourinary: Negative for dysuria Musculoskeletal: Negative for myalgias. Skin: Negative for rash Heme: No problems bleeding. Neurological: Negative for speech change and focal weakness.  
  
  
 
 
Intake/Output Summary (Last 24 hours) at 2018 1233 Last data filed at 2018 0800 Gross per 24 hour Intake 1080 ml Output  Net 1080 ml  
 
 
 
 
 
LABS / OTHER STUDIES:  
 
Recent Results (from the past 24 hour(s))  
CK W/ CKMB & INDEX Collection Time: 18  5:00 PM  
Result Value Ref Range CK 41 39 - 308 U/L  
 CK - MB 4.2 (H) <3.6 NG/ML  
 CK-MB Index 10.2 (H) 0 - 2.5    
TROPONIN I Collection Time: 18  5:00 PM  
Result Value Ref Range Troponin-I, Qt. 0.10 (H) <0.05 ng/mL TROPONIN I Collection Time: 18 12:13 AM  
Result Value Ref Range Troponin-I, Qt. 0.07 (H) <0.05 ng/mL CK W/ CKMB & INDEX Collection Time: 11/18/18 12:13 AM  
Result Value Ref Range CK 45 39 - 308 U/L  
 CK - MB 3.5 <3.6 NG/ML  
 CK-MB Index 7.8 (H) 0 - 2.5    
CBC W/O DIFF Collection Time: 11/18/18 12:13 AM  
Result Value Ref Range WBC 14.2 (H) 4.1 - 11.1 K/uL  
 RBC 4.44 4.10 - 5.70 M/uL  
 HGB 13.2 12.1 - 17.0 g/dL HCT 40.4 36.6 - 50.3 % MCV 91.0 80.0 - 99.0 FL  
 MCH 29.7 26.0 - 34.0 PG  
 MCHC 32.7 30.0 - 36.5 g/dL  
 RDW 14.9 (H) 11.5 - 14.5 % PLATELET 671 849 - 909 K/uL MPV 10.3 8.9 - 12.9 FL  
 NRBC 0.0 0  WBC ABSOLUTE NRBC 0.00 0.00 - 0.01 K/uL MAGNESIUM Collection Time: 11/18/18 12:13 AM  
Result Value Ref Range Magnesium 1.9 1.6 - 2.4 mg/dL METABOLIC PANEL, BASIC Collection Time: 11/18/18 12:13 AM  
Result Value Ref Range Sodium 140 136 - 145 mmol/L Potassium 4.3 3.5 - 5.1 mmol/L Chloride 104 97 - 108 mmol/L  
 CO2 26 21 - 32 mmol/L Anion gap 10 5 - 15 mmol/L Glucose 155 (H) 65 - 100 mg/dL BUN 38 (H) 6 - 20 MG/DL Creatinine 1.72 (H) 0.70 - 1.30 MG/DL  
 BUN/Creatinine ratio 22 (H) 12 - 20 GFR est AA 49 (L) >60 ml/min/1.73m2 GFR est non-AA 40 (L) >60 ml/min/1.73m2 Calcium 8.3 (L) 8.5 - 10.1 MG/DL  
PHOSPHORUS Collection Time: 11/18/18 12:13 AM  
Result Value Ref Range Phosphorus 3.4 2.6 - 4.7 MG/DL  
HEMOGLOBIN A1C WITH EAG Collection Time: 11/18/18 12:13 AM  
Result Value Ref Range Hemoglobin A1c 6.8 (H) 4.2 - 6.3 % Est. average glucose 148 mg/dL  
 
 
 
  
  
CARDIAC DIAGNOSTICS:  
  
Cardiac Evaluation Includes: 
  
   
Cath (8/3/12): LM ok, 50% mid LAD, mid 70% stenosis in LCX.   RCA is dominant and completely occluded proximally (filled by collaterals)   -->  He received 2 stents in the LCX. (promus) 
  
KELLEY's - 8/10/13: Right ankle arm 0.72 and left 0.52 
  
Echo 8/13 - EF 55% mild LAE, modeerate Mr 
  
Echo (4/17/15) - LVEF 55%  There was akinesis of the basal inferior wall(s). There was severe hypokinesis of the basal inferoseptal and basal inferolateral wall(s). Mod-severe MR.   
  
Carotid Doppler 4/15 -  16-49% stenosis bilaterally 
  
Lexiscan Cardiolite 3/16 - partially reversible inferior wall defect. SSS = 5, SRS = 1, SDS - 4. LVEF 54% 
  
Echo 3/31/16 - LVEF 55-60%, akinesis of inferior wall. Severe MR.  
  
Echo 2/14/17 - LVEF 55-60%, LAE. Neg bubble study. Mild MR.  
  
Carotid Doppler 2/17 - mild disease bilat  
  
  
EKG 11/17/18 - NSR, LVH, PRWP 
- I viewed EKG myself (similar to prior EKG)  
  
CXR 11/17/18 - no acute process, calcified right pleural plaque Component Latest Ref Rng & Units 11/17/2018  
 
      2:43 AM  
NT pro-BNP 
    0 - 125 PG/ML 6,183 (H)  
 
  
  
ASSESSMENT AND PLAN:  
  
Assessment and Plan: 
Mr. Mix is a 58 y.o.   male with a history of CAD, CKD, HTN, CVA who presents 11/17/18 complaining of SOB and chest pain. He's had FIGUEROA for several weeks with orthopnea as well. He has history of intermittent angina, but had chest tightness for a couple of nights requiring SL NTG. His blood pressure on admission was 240/97. 
  
1) Hypertensive Urgency   
- He says -170 for over a year. /97 on admission.  
- Suspect a lot of his problems (decompensated CHF, angina, elevated trop) could be due to uncontrolled HTN.  
- Will work with Nephrology on the anti-hypertensive regimen given his renal disease 
 - BP remains high 11/18/18 ---> Coreg increased (was recently started). Was on Norvasc 10 mg on admission. Bumex added by Nephrology yesterday. I'll start hydralazine (would defer ACE-I/ARB use to Nephrology)   
2) CAD and chest pain; elevated troponin   
- he has known CAD with his RCA chronically occluded. - he continues to smoke and last saw Dr. Lynda Goldsmith in 2015 - Will treat medically for CAD and control his BP first 
- If he continues to have anginal symptoms despite meds, then could proceed with further workup (stress test vs cath) - Have restarted ASA and a BB. Cont statin   
- smoking cessation advised   
3) Acute Respiratory failure - Given presenting symptoms (orthopnea, PND) and high proBNP, I suspected acute decompensated CHF -  however he has not felt better with diuresis and is wheezing on exam and I'm not certain of his volume status (lungs clear on CXR) ---> will check filling pressures on his echo. - Will continue to diurese, watching renal function closely. Would consider a Pulmonary evaluation if problems persist. 4) Moderate Carotid artery disease  
- statin, ASA, serial studies   
5) History of MR 
- checking an echo  
  
6) SOPHIA/CKD (presumed FSGS) - per renal  
  
  
Jacobo Cano MD, [address], [address]      [address] [place] [address]                 [address] Ph: [phone]                               Ph [phone]

## 2018-11-18 NOTE — PROGRESS NOTES
Bedside and Verbal shift change report given to Alysha Gifford (oncoming nurse) by 6 [facility] (offgoing nurse). Report included the following information SBAR, Kardex, ED Summary, Procedure Summary, Intake/Output, MAR, Accordion, Recent Results, Med Rec Status and Cardiac Rhythm NSR.  
 
2121: Administered prn hydralazine for /82 
 
2200: Repeat BP: 175/73.  
 
2307: Spoke with Dr. Fozia Chicas, informed him of patient's BP trending up as the night has progressed. sBP >180 then only decreasing to the 170s once prn med was given. 36: Notified Dr. Fozia Chicas of BP. No new orders. 9171: Paged Dr. Antonia Casas in reference to patient's BP. He called back and gave additional orders. I asked when he would like to be called back if BP did not respond to clonidine. He stated that sBP in the 180s was fine, not emergent, and he would address it in the am.  
 
0352: [facility] orders from Dr. Fozia Chicas to increase prn hydralazine to 20 mg. Informed him of the above and of current BP. Ok to still increase medication dose.

## 2018-11-18 NOTE — PROGRESS NOTES
BP remains markedly elevated despite changes made today (increasing Norvasc and Coreg, adding hydralazine). Add nitropaste. Monitor closely.

## 2018-11-18 NOTE — PROGRESS NOTES
Terrell Varma [facility] [address] 
[address] [facility], [address] [place] [address] 
[phone] Medical Progress Note NAME: Holly Zafar :  1955 MRM:  [id number] Date/Time: 2018 Assessment / Plan: Hypertensive urgency: BP uncontrolled. Increased Norvasc and Coreg today. Cardiology added hydralazine. TTE pending Wheezing / dyspnea: multifactorial. Was wheezing significantly yesterday and started on schedule duonebs, Better this morning, apparently worsening again later in the day per report. CXR showed no acute findings, but calcific right pleural plaque. Change PO prednisone to IV Solu-medrol. Increase frequency of duo-nebs. Continue PRN albuterol nebs. Per cardiology has acute decompensated heart failure w/ preserved EF. BLE edema could be from severe proteinuria. Dopplers negative for DVT. Repeat echo pending. Diuresing on PO Bumex. Watch renal function. Will pulmonology to see tomorrow, if no better. Again, smoking cessation will be crucial.  
 
  CAD (coronary artery disease) / PAD / carotid artery disease: appreciate cardiology and vascular surgery following. Needs outpatient follow up. Add ASA, statin, BP control. Needs to stop smoking, again discussed today Elevated serum creatinine: CKD3, had biopsy done outpatient, with presumed FSGS. Was on 60mg prednisone outpatient, followed by Dr. Raya Timmons. Follow BMP closely. On Lovenox SQ for chemoppx Hyperglycemia: meets criteria for DM, as A1c is 6.8%. New diagnosis. BG will be worse on steroids. Start NPH BID while on steroids. Start SSI Leukocytosis: likely from leukocyte demargination from steroids. Follow CBC. Low threshold for antibiotics Total time spent: 35 minutes Time spent in the care of this patient including reviewing records, discussing with nursing and/or other providers on the treatment team, obtaining history and examining the patient, and discussing treatment plans. Care Plan discussed with: Patient, Nursing Staff and >50% of time spent in counseling and coordination of care Discussed:  Care Plan Prophylaxis:  Lovenox Disposition:  Home w/Family Subjective: Chief Complaint:  Follow up elevated BP Chart/notes/labs/studies reviewed, patient examined at bedside. Multiple complaints. States his BP is not controlled. Had very short, transient sharp chest pain with coughing spells. Objective:  
 
 
Vitals:  
 
  
Last 24hrs VS reviewed since prior progress note. Most recent are: 
 
Visit Vitals /55 (BP 1 Location: Right arm, BP Patient Position: At rest) Pulse 69 Temp 97.4 °F (36.3 °C) Resp 16 Ht 5' 9\" (1.753 m) Wt 71 kg (156 lb 8 oz) SpO2 95% BMI 23.11 kg/m² SpO2 Readings from Last 6 Encounters:  
11/18/18 95% 02/15/17 94% 06/12/15 98% 06/08/15 100% 04/10/15 97% Intake/Output Summary (Last 24 hours) at 11/18/2018 1321 Last data filed at 11/18/2018 1306 Gross per 24 hour Intake 1080 ml Output 775 ml Net 305 ml Exam:  
 
Physical Exam: 
 
Gen:  Well-developed, well-nourished, in no acute distress. NAD HEENT:  Sclerae nonicteric, hearing intact to voice, mucous membranes moist 
Neck:  Supple, without masses. Resp:  No accessory muscle use, mildly diminished, no significant wheezing this morning. No rales or rhonchi 
Card: RRR, without m/r/g. 2+ BLE edema. Abd:  +bowel sounds, soft, NTTP, nondistended. No HSM. Neuro: Face symmetric, tongue midline, speech fluent, follows commands appropriately Psych:  Alert, oriented x 3. Good insight Medications Reviewed: (see below) Lab Data Reviewed: (see below) 
 
______________________________________________________________________ Medications:  
 
Current Facility-Administered Medications Medication Dose Route Frequency  hydrALAZINE (APRESOLINE) 20 mg/mL injection 20 mg  20 mg IntraVENous Q6H PRN  
  amLODIPine (NORVASC) tablet 10 mg  10 mg Oral DAILY  carvedilol (COREG) tablet 12.5 mg  12.5 mg Oral BID WITH MEALS  diphenhydrAMINE (BENADRYL) capsule 25 mg  25 mg Oral Q6H PRN  
 albuterol-ipratropium (DUO-NEB) 2.5 MG-0.5 MG/3 ML  3 mL Nebulization Q4H RT  
 aspirin chewable tablet 81 mg  81 mg Oral DAILY  hydrALAZINE (APRESOLINE) tablet 25 mg  25 mg Oral TID  sodium chloride (NS) flush 5-10 mL  5-10 mL IntraVENous Q8H  
 sodium chloride (NS) flush 5-10 mL  5-10 mL IntraVENous PRN  
 acetaminophen (TYLENOL) tablet 650 mg  650 mg Oral Q4H PRN  
 oxyCODONE-acetaminophen (PERCOCET) 5-325 mg per tablet 1 Tab  1 Tab Oral Q4H PRN  
 HYDROmorphone (PF) (DILAUDID) injection 0.5 mg  0.5 mg IntraVENous Q4H PRN  prochlorperazine (COMPAZINE) injection 10 mg  10 mg IntraVENous Q6H PRN  
 zolpidem (AMBIEN) tablet 5 mg  5 mg Oral QHS PRN  
 enoxaparin (LOVENOX) injection 40 mg  40 mg SubCUTAneous Q24H  
 bumetanide (BUMEX) tablet 2 mg  2 mg Oral DAILY  predniSONE (DELTASONE) tablet 20 mg  20 mg Oral TID WITH MEALS  
 albuterol (PROVENTIL VENTOLIN) nebulizer solution 2.5 mg  2.5 mg Nebulization Q2H PRN  
 nicotine (NICODERM CQ) 21 mg/24 hr patch 1 Patch  1 Patch TransDERmal Q24H  
 guaiFENesin-codeine (ROBITUSSIN AC) 100-10 mg/5 mL solution 5 mL  5 mL Oral Q4H PRN  
 atorvastatin (LIPITOR) tablet 80 mg  80 mg Oral QHS Lab Review:  
 
Recent Labs 11/18/18 
0013 11/17/18 
0243 WBC 14.2* 16.0*  
HGB 13.2 12.9 HCT 40.4 40.1  200 Recent Labs 11/18/18 
0013 11/17/18 
0243  142  
K 4.3 3.8  104 CO2 26 30 * 174* BUN 38* 33* CREA 1.72* 1.55* CA 8.3* 8.1*  
MG 1.9  --   
PHOS 3.4  --   
ALB  --  2.8* SGOT  --  18 ALT  --  34 No components found for: [place] No results for input(s): PH, PCO2, PO2, HCO3, FIO2 in the last 72 hours. No results for input(s): INR in the last 72 hours.  
 
No lab exists for component: INREXT 
 No results found for: MICAH No results found for: CULT 
        
___________________________________________________ Attending Physician: Nahun Kiser MD

## 2018-11-18 NOTE — PROGRESS NOTES
SHIFT CHANGE: 
7:35 AM Report received from Blank Gray RN. SBAR, Kardex, Procedure Summary, Intake/Output, MAR, Recent Results, Med Rec Status and Cardiac Rhythm NSR were discussed. SHIFT SUMMARY: 
8:22 AM  Patient states he hasn't been using the urinal to void. Educated patient that we need to measure his urine output so he needs to void in the urinal.  Patient voiced understanding. 10:03 AM  Verbal orders received from Dr. Kumar Bennett for PRN Benadryl 25 mg Q6 hrs for itching. END OF SHIFT REPORT: 
8:03 PM Bedside and Verbal shift change report given to Luis Sanchez RN (oncoming nurse) by Malachi Guerrero RN (offgoing nurse). Report included the following information SBAR, Kardex, Procedure Summary, Intake/Output, MAR, Recent Results, Med Rec Status and Cardiac Rhythm NSR.

## 2018-11-18 NOTE — CONSULTS
Surgery Consult    Subjective:      Stacey Khan is a 58 y.o. male who presents for evaluation of hypertension. He has a significant atherosclerosis history. His blood pressure was over 240 at home and he had associated chest tightness. He presented and was found to have a mild troponin elevation. He has known carotid disease but does not follow anyone. He underwent a carotid ultrasound that showed moderate disease with a high grade right ECA stenosis. He is asymptomatic and denies any stroke, ministroke or amaurosis sxs. Denies dizziness. He does have lower extremity claudication and has a history of multiple stents in his bilateral lower extremity. He is followed by [facility].      Past Medical History:   Diagnosis Date    Acute CVA (cerebrovascular accident) ([address] 2/15/2017    Per clinical exam    CAD (coronary artery disease)     MI 1995; stenting x 2 in 2012     [facility]     Cough     Diplopia 2/14/2017    Fatigue     FSGS (focal segmental glomerulosclerosis)     Headache     History of CVA (cerebrovascular accident) 2/14/2017    CT head: chronic L BG infarct    Hyperlipidemia     Hypertension     Mitral regurgitation     Mod-severe MR in 2015    Muscle weakness     [facility]VD [facility]peripheral vascular disease) ([facility]      Past Surgical History:   Procedure Laterality Date    CARDIAC SURG PROCEDURE UNLIST      4 cardiac stents, 3 stens to L leg, 2 stens R leg, 1 stent to abdomen    HX MOHS PROCEDURES Right 1/2015    HX ORTHOPAEDIC Left 4/2015    rotator cuff manipulation    NEUROLOGICAL PROCEDURE UNLISTED      EMG      Family History   Problem Relation Age of Onset    Heart Disease Brother      Social History     Socioeconomic History    Marital status:      Spouse name: Not on file    Number of children: Not on file    Years of education: Not on file    Highest education level: Not on file   Social Needs    Financial resource strain: Not on file    Food insecurity - worry: Not on file    Food insecurity - inability: Not on file    Transportation needs - medical: Not on file   [employer] needs - non-medical: Not on file   Occupational History    Not on file   Tobacco Use    Smoking status: Current Every Day Smoker     Packs/day: 0.25     Types: Cigarettes     Last attempt to quit: 2016     Years since quittin.7    Smokeless tobacco: Current User   Substance and Sexual Activity    Alcohol use:  Yes     Alcohol/week: 12.0 oz     Types: 24 Cans of beer per week     Comment: socially    Drug use: No    Sexual activity: Yes   Other Topics Concern    Not on file   Social History Narrative    Not on file      Current Facility-Administered Medications   Medication Dose Route Frequency Provider Last Rate Last Dose    hydrALAZINE (APRESOLINE) 20 mg/mL injection 20 mg  20 mg IntraVENous Q6H PRN Rosalio Singleton MD        amLODIPine (NORVASC) tablet 10 mg  10 mg Oral DAILY Kirsten Magana MD   10 mg at 18 0804    carvedilol (COREG) tablet 12.5 mg  12.5 mg Oral BID WITH MEALS Kirsten Magana MD   12.5 mg at 18 0805    sodium chloride (NS) flush 5-10 mL  5-10 mL IntraVENous Q8H Renetta Simon MD   10 mL at 18 2121    sodium chloride (NS) flush 5-10 mL  5-10 mL IntraVENous PRN Renetta Simon MD        acetaminophen (TYLENOL) tablet 650 mg  650 mg Oral Q4H PRN Renetta Simon MD        oxyCODONE-acetaminophen (PERCOCET) 5-325 mg per tablet 1 Tab  1 Tab Oral Q4H PRN Renetta Simon MD        HYDROmorphone (PF) (DILAUDID) injection 0.5 mg  0.5 mg IntraVENous Q4H PRN Renetta Simon MD        prochlorperazine (COMPAZINE) injection 10 mg  10 mg IntraVENous Q6H PRN Renetta Simon MD        zolpidem (AMBIEN) tablet 5 mg  5 mg Oral QHS PRN Renetta Simon MD   5 mg at 18 0024    enoxaparin (LOVENOX) injection 40 mg  40 mg SubCUTAneous Q24H Renetta Simon MD   40 mg at 18 0805    bumetanide (BUMEX) tablet 2 mg  2 mg Oral DAILY Renetta Ruff MD   2 mg at 18 2751    predniSONE (DELTASONE) tablet 20 mg  20 mg Oral TID WITH MEALS Khoi TENA MD   20 mg at 18 0804    albuterol-ipratropium (DUO-NEB) 2.5 MG-0.5 MG/3 ML  3 mL Nebulization Q6H RT Melonie Tang MD   3 mL at 18 0723    albuterol (PROVENTIL VENTOLIN) nebulizer solution 2.5 mg  2.5 mg Nebulization Q2H PRN Melonie Tang MD        nicotine (NICODERM CQ) 21 mg/24 hr patch 1 Patch  1 Patch TransDERmal Q24H Melonie Tang MD   1 Patch at 18 1217    guaiFENesin-codeine (ROBITUSSIN AC) 100-10 mg/5 mL solution 5 mL  5 mL Oral Q4H PRN Melonie Tang MD   5 mL at 18 0805    atorvastatin (LIPITOR) tablet 80 mg  80 mg Oral Malathi Riggins MD   80 mg at 182        Allergies   Allergen Reactions    Ace Inhibitors Cough       Review of Systems:  A comprehensive review of systems was negative except for that written in the History of Present Illness. Objective:        Patient Vitals for the past 8 hrs:   BP Temp Pulse Resp SpO2 Weight   18 0900 180/66        18 0800 184/64 97.8 °F (36.6 °C) 72 18 95 %    18 0723     93 %    18 0705   68      18 0532      71 kg (156 lb 8 oz)   18 0500 179/75  69 18 93 %    18 0400 183/71  70 19 93 %    18 0300 193/75  76 21 91 %    18 0234 182/75  75 23 94 %        Temp (24hrs), Av.8 °F (36.6 °C), Min:97.6 °F (36.4 °C), Max:98 °F (36.7 °C)      Physical Exam:  GENERAL: alert, cooperative, no distress, appears stated age, THROAT & NECK: normal and no erythema or exudates noted. , LUNG: clear to auscultation bilaterally, HEART: regular rate and rhythm, S1, S2 normal, no murmur, click, rub or gallop, ABDOMEN: soft, non-tender. Bowel sounds normal. No masses,  no organomegaly, EXTREMITIES:  Pitting edema, nonpalpable distal pulses, NEUROLOGIC: AOx3. Gait normal. Reflexes and motor strength normal and symmetric.  Cranial nerves 2-12 and sensation grossly intact., PSYCHIATRIC: non focal    Assessment:     59 y/o WM with advanced vascular disease with asymptomatic right ICA stenosis. Plan:     Mr. Norma Willis has moderate right sided asymptomatic carotid disease. The ECA stenosis has no clinical importance. I reviewed with him the importance of ongoing screening ultrasounds for proggression. Continue aggressive medical regiment with antiplatelet therapy and statin. He can followup with myself or Dr. Carol Gaspar with repeat carotid studies in 6 months. Thanks for the consult, please call with questions.      Signed By: Valentin Forde MD     November 18, 2018

## 2018-11-19 ENCOUNTER — APPOINTMENT (OUTPATIENT)
Dept: GENERAL RADIOLOGY | Age: 63
DRG: 304 | End: 2018-11-19
Attending: SPECIALIST
Payer: MEDICARE

## 2018-11-19 LAB
ANION GAP SERPL CALC-SCNC: 10 MMOL/L (ref 5–15)
BNP SERPL-MCNC: 3465 PG/ML (ref 0–125)
BUN SERPL-MCNC: 49 MG/DL (ref 6–20)
BUN/CREAT SERPL: 25 (ref 12–20)
CALCIUM SERPL-MCNC: 8.7 MG/DL (ref 8.5–10.1)
CHLORIDE SERPL-SCNC: 102 MMOL/L (ref 97–108)
CO2 SERPL-SCNC: 26 MMOL/L (ref 21–32)
COMMENT, HOLDF: NORMAL
CREAT SERPL-MCNC: 1.98 MG/DL (ref 0.7–1.3)
ERYTHROCYTE [DISTWIDTH] IN BLOOD BY AUTOMATED COUNT: 15 % (ref 11.5–14.5)
GLUCOSE BLD STRIP.AUTO-MCNC: 134 MG/DL (ref 65–100)
GLUCOSE BLD STRIP.AUTO-MCNC: 172 MG/DL (ref 65–100)
GLUCOSE BLD STRIP.AUTO-MCNC: 247 MG/DL (ref 65–100)
GLUCOSE BLD STRIP.AUTO-MCNC: 314 MG/DL (ref 65–100)
GLUCOSE SERPL-MCNC: 206 MG/DL (ref 65–100)
HCT VFR BLD AUTO: 38.8 % (ref 36.6–50.3)
HGB BLD-MCNC: 12.7 G/DL (ref 12.1–17)
MAGNESIUM SERPL-MCNC: 2 MG/DL (ref 1.6–2.4)
MCH RBC QN AUTO: 29.8 PG (ref 26–34)
MCHC RBC AUTO-ENTMCNC: 32.7 G/DL (ref 30–36.5)
MCV RBC AUTO: 91.1 FL (ref 80–99)
NRBC # BLD: 0 K/UL (ref 0–0.01)
NRBC BLD-RTO: 0 PER 100 WBC
PLATELET # BLD AUTO: 196 K/UL (ref 150–400)
PMV BLD AUTO: 10.4 FL (ref 8.9–12.9)
POTASSIUM SERPL-SCNC: 4 MMOL/L (ref 3.5–5.1)
RBC # BLD AUTO: 4.26 M/UL (ref 4.1–5.7)
SAMPLES BEING HELD,HOLD: NORMAL
SERVICE CMNT-IMP: ABNORMAL
SODIUM SERPL-SCNC: 138 MMOL/L (ref 136–145)
WBC # BLD AUTO: 14.3 K/UL (ref 4.1–11.1)

## 2018-11-19 PROCEDURE — 74011000250 HC RX REV CODE- 250: Performed by: INTERNAL MEDICINE

## 2018-11-19 PROCEDURE — 36415 COLL VENOUS BLD VENIPUNCTURE: CPT

## 2018-11-19 PROCEDURE — 85027 COMPLETE CBC AUTOMATED: CPT

## 2018-11-19 PROCEDURE — 74011000250 HC RX REV CODE- 250: Performed by: PHYSICIAN ASSISTANT

## 2018-11-19 PROCEDURE — 80048 BASIC METABOLIC PNL TOTAL CA: CPT

## 2018-11-19 PROCEDURE — 94640 AIRWAY INHALATION TREATMENT: CPT

## 2018-11-19 PROCEDURE — 74011250636 HC RX REV CODE- 250/636: Performed by: INTERNAL MEDICINE

## 2018-11-19 PROCEDURE — 83835 ASSAY OF METANEPHRINES: CPT

## 2018-11-19 PROCEDURE — 83880 ASSAY OF NATRIURETIC PEPTIDE: CPT

## 2018-11-19 PROCEDURE — 74011250637 HC RX REV CODE- 250/637: Performed by: NURSE PRACTITIONER

## 2018-11-19 PROCEDURE — 74011636637 HC RX REV CODE- 636/637: Performed by: INTERNAL MEDICINE

## 2018-11-19 PROCEDURE — 74011250637 HC RX REV CODE- 250/637: Performed by: SPECIALIST

## 2018-11-19 PROCEDURE — 82088 ASSAY OF ALDOSTERONE: CPT

## 2018-11-19 PROCEDURE — 71046 X-RAY EXAM CHEST 2 VIEWS: CPT

## 2018-11-19 PROCEDURE — 74011250637 HC RX REV CODE- 250/637: Performed by: INTERNAL MEDICINE

## 2018-11-19 PROCEDURE — 93975 VASCULAR STUDY: CPT

## 2018-11-19 PROCEDURE — 65660000000 HC RM CCU STEPDOWN

## 2018-11-19 PROCEDURE — 83735 ASSAY OF MAGNESIUM: CPT

## 2018-11-19 PROCEDURE — 82962 GLUCOSE BLOOD TEST: CPT

## 2018-11-19 RX ORDER — CLONIDINE HYDROCHLORIDE 0.1 MG/1
0.1 TABLET ORAL 3 TIMES DAILY
Status: DISCONTINUED | OUTPATIENT
Start: 2018-11-19 | End: 2018-11-20

## 2018-11-19 RX ORDER — CLONIDINE HYDROCHLORIDE 0.1 MG/1
0.1 TABLET ORAL 3 TIMES DAILY
Status: DISCONTINUED | OUTPATIENT
Start: 2018-11-19 | End: 2018-11-19

## 2018-11-19 RX ORDER — BUMETANIDE 1 MG/1
1 TABLET ORAL DAILY
Status: DISCONTINUED | OUTPATIENT
Start: 2018-11-19 | End: 2018-11-21

## 2018-11-19 RX ORDER — LABETALOL 200 MG/1
200 TABLET, FILM COATED ORAL EVERY 12 HOURS
Status: DISCONTINUED | OUTPATIENT
Start: 2018-11-20 | End: 2018-11-20

## 2018-11-19 RX ORDER — SODIUM CHLORIDE 9 MG/ML
100 INJECTION, SOLUTION INTRAVENOUS CONTINUOUS
Status: DISCONTINUED | OUTPATIENT
Start: 2018-11-19 | End: 2018-11-19

## 2018-11-19 RX ORDER — ALBUTEROL SULFATE 1.25 MG/3ML
1.25 SOLUTION RESPIRATORY (INHALATION)
Status: DISCONTINUED | OUTPATIENT
Start: 2018-11-19 | End: 2018-11-21

## 2018-11-19 RX ORDER — HEPARIN SODIUM 5000 [USP'U]/ML
5000 INJECTION, SOLUTION INTRAVENOUS; SUBCUTANEOUS EVERY 8 HOURS
Status: DISCONTINUED | OUTPATIENT
Start: 2018-11-20 | End: 2018-11-21 | Stop reason: HOSPADM

## 2018-11-19 RX ORDER — LABETALOL 200 MG/1
200 TABLET, FILM COATED ORAL EVERY 12 HOURS
Status: DISCONTINUED | OUTPATIENT
Start: 2018-11-19 | End: 2018-11-19

## 2018-11-19 RX ADMIN — Medication 10 ML: at 13:05

## 2018-11-19 RX ADMIN — ENOXAPARIN SODIUM 40 MG: 40 INJECTION SUBCUTANEOUS at 09:16

## 2018-11-19 RX ADMIN — INSULIN LISPRO 3 UNITS: 100 INJECTION, SOLUTION INTRAVENOUS; SUBCUTANEOUS at 22:02

## 2018-11-19 RX ADMIN — METHYLPREDNISOLONE SODIUM SUCCINATE 60 MG: 40 INJECTION, POWDER, FOR SOLUTION INTRAMUSCULAR; INTRAVENOUS at 17:43

## 2018-11-19 RX ADMIN — ATORVASTATIN CALCIUM 80 MG: 20 TABLET, FILM COATED ORAL at 22:02

## 2018-11-19 RX ADMIN — ALBUTEROL SULFATE 1.25 MG: 1.25 SOLUTION RESPIRATORY (INHALATION) at 15:16

## 2018-11-19 RX ADMIN — CLONIDINE HYDROCHLORIDE 0.1 MG: 0.1 TABLET ORAL at 16:35

## 2018-11-19 RX ADMIN — CLONIDINE HYDROCHLORIDE 0.1 MG: 0.1 TABLET ORAL at 22:02

## 2018-11-19 RX ADMIN — IPRATROPIUM BROMIDE AND ALBUTEROL SULFATE 3 ML: .5; 3 SOLUTION RESPIRATORY (INHALATION) at 07:38

## 2018-11-19 RX ADMIN — GUAIFENESIN AND CODEINE PHOSPHATE 5 ML: 100; 10 SOLUTION ORAL at 10:36

## 2018-11-19 RX ADMIN — CLONIDINE HYDROCHLORIDE 0.1 MG: 0.1 TABLET ORAL at 09:15

## 2018-11-19 RX ADMIN — CARVEDILOL 12.5 MG: 6.25 TABLET, FILM COATED ORAL at 09:15

## 2018-11-19 RX ADMIN — HYDRALAZINE HYDROCHLORIDE 20 MG: 20 INJECTION INTRAMUSCULAR; INTRAVENOUS at 07:09

## 2018-11-19 RX ADMIN — Medication 10 ML: at 06:47

## 2018-11-19 RX ADMIN — METHYLPREDNISOLONE SODIUM SUCCINATE 60 MG: 40 INJECTION, POWDER, FOR SOLUTION INTRAMUSCULAR; INTRAVENOUS at 13:04

## 2018-11-19 RX ADMIN — METHYLPREDNISOLONE SODIUM SUCCINATE 60 MG: 40 INJECTION, POWDER, FOR SOLUTION INTRAMUSCULAR; INTRAVENOUS at 06:47

## 2018-11-19 RX ADMIN — BUMETANIDE 1 MG: 1 TABLET ORAL at 09:15

## 2018-11-19 RX ADMIN — ALBUTEROL SULFATE 1.25 MG: 1.25 SOLUTION RESPIRATORY (INHALATION) at 20:16

## 2018-11-19 RX ADMIN — INSULIN HUMAN 10 UNITS: 100 INJECTION, SUSPENSION SUBCUTANEOUS at 16:37

## 2018-11-19 RX ADMIN — INSULIN LISPRO 7 UNITS: 100 INJECTION, SOLUTION INTRAVENOUS; SUBCUTANEOUS at 16:38

## 2018-11-19 RX ADMIN — ASPIRIN 81 MG 81 MG: 81 TABLET ORAL at 09:15

## 2018-11-19 RX ADMIN — AMLODIPINE BESYLATE 10 MG: 5 TABLET ORAL at 09:15

## 2018-11-19 RX ADMIN — NITROGLYCERIN 1 INCH: 20 OINTMENT TOPICAL at 09:17

## 2018-11-19 RX ADMIN — INSULIN HUMAN 10 UNITS: 100 INJECTION, SUSPENSION SUBCUTANEOUS at 09:16

## 2018-11-19 RX ADMIN — CARVEDILOL 12.5 MG: 6.25 TABLET, FILM COATED ORAL at 16:35

## 2018-11-19 RX ADMIN — METHYLPREDNISOLONE SODIUM SUCCINATE 60 MG: 40 INJECTION, POWDER, FOR SOLUTION INTRAMUSCULAR; INTRAVENOUS at 23:09

## 2018-11-19 RX ADMIN — NITROGLYCERIN 1 INCH: 20 OINTMENT TOPICAL at 17:43

## 2018-11-19 RX ADMIN — Medication 10 ML: at 23:12

## 2018-11-19 NOTE — PROGRESS NOTES
0725 - Bedside and Verbal shift change report given to Esau Jj (oncoming nurse) by Gurjit Curtis (offgoing nurse). Report included the following information SBAR, Kardex, Accordion, Recent Results and Cardiac Rhythm NSR.

## 2018-11-19 NOTE — CDMP QUERY
Based on the need for increased specificity in documentation for the new ICD 10 coding system, please include the following components in your documentation regarding:  CHF Documentation for heart failure must: Specify Acuity :   Acute, Chronic, acute on chronic Identify Type:    Systolic, Diastolic, Combined systolic and diastolic failure List the relationship of HTN to Heart Failure Identify the underlying cause The medical record reflects the following clinical findings, treatment, and risk factors. Risk Factors:  H/o CHF Clinical Indicators:   
ECHO 
SUMMARY: 
Left ventricle: Systolic function was mildly reduced. Ejection fraction 
was estimated to be 50 %. H&P 
\" Acute decompensated CHF \" 
 
pro-BNP 6,105/7,894 Treatment: Bumex 1 mg PO daily, Coreg 12.5 mg PO daily, weight monitoring, I&O, ECHO, Cardiology consult Please clarify and document your clinical opinion in the progress notes and discharge summary including the definitive and/or presumptive diagnosis, (suspected or probable), related to the above clinical findings. Please include clinical findings supporting your diagnosis. Thank you Cindi Han 
[facility] 
[phone]

## 2018-11-19 NOTE — PROGRESS NOTES
Cardiology Progress Note [address] [place] [address]. [address], [address] Phone [phone]; Fax [phone] 
 
 
 
2018 8:49 AM  
 
Admit Date:           2018 Admit Diagnosis:  Hypertensive emergency :          1955 MRN:          [id number] ASSESSMENT/RECOMMENDATION:  
1)Hypertensive Urgency   
- He says -170 for over a year.  /97 on admission.  
- Suspect a lot of his problems (decompensated CHF, angina, elevated trop) could be due to uncontrolled HTN.  
- Will work with Nephrology on the anti-hypertensive regimen given his renal disease 
 - continue coreg/norvasc/clonodine (new today), add back bumex 1 mg daily. Holding off on ACE/ARB d/w with nephrology. If renal US is negative suspect higher BP could be d/t  steroid use   
2) CAD and chest pain; elevated troponin   
- he has known CAD with his RCA chronically occluded. - he continues to smoke and last saw Dr. Lynda Goldsmith in  - Will treat medically for CAD and control his BP first 
- consider cath if he continues to have anginal symptoms  
- Have restarted ASA and a BB. Cont statin   
- smoking cessation advised -nicotine patch  
  
3) Acute Respiratory failure/COPD 
- breathing comfortably on RA (has had weeks of SOB and it is now a little better) - pulmonary consulted 
-chronic steroid use 4) Moderate Carotid artery disease  
- statin, ASA, serial studies   
5) History of MR 
- loud murmur at apex/mild MR per echo--> follow on serial studies   
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 Creatinine trending up at 1.9.  
-control BP 
-awaiting [facility] 
 
 
CARDIOLOGY ATTENDING Patient personally seen and examined. All the elements of history and examination were personally performed. Assessment and plan was discussed and agree as written above This has been a difficult case. BP has remained high despite the meds we have tried (BB, hydralazine, nitrates -- unable to use ACE-I/ARB due to SOPHIA, but he did say this was not effective as outpatient). Will now try clonidine for HTN (reviewed side effects with him). Workup for secondary HTN underway. Spoke to Dr. Joe Frazier - Due to edema and proteinuria, he wants to continue diuretics despite increasing Cr. If volume status remains confusing, then will consider a RHC to further assess (tomorrow?). Pulmonary consulted for COPD. His SOB seems out of proportion to his volume status and did not get any better despite diuretics. Murmur suggest MR, however echo with just mild MR--> will follow on serial studies Eugene Rios MD, [facility] No intake/output data recorded. Last 3 Recorded Weights in this Encounter 11/17/18 0631 11/18/18 0532 11/19/18 7493 Weight: 159 lb 13.3 oz (72.5 kg) 156 lb 8 oz (71 kg) 153 lb 3.5 oz (69.5 kg)  
 
 
 
11/17 1901 - 11/19 0700 In: 1560 [P.O.:1560] Out: 1575 [TSYAF:2779] SUBJECTIVE Radha Aleman denies palpitations, irregular heart beat, SOB, chest pain or LE edema. Denies h/a No lightheadedness or dizziness Did not sleep well Current Facility-Administered Medications Medication Dose Route Frequency  insulin NPH (NOVOLIN N, HUMULIN N) injection 10 Units  10 Units SubCUTAneous ACB&D  
 0.9% sodium chloride infusion  100 mL/hr IntraVENous CONTINUOUS  
 cloNIDine HCl (CATAPRES) tablet 0.1 mg  0.1 mg Oral TID  hydrALAZINE (APRESOLINE) 20 mg/mL injection 20 mg  20 mg IntraVENous Q6H PRN  
 amLODIPine (NORVASC) tablet 10 mg  10 mg Oral DAILY  carvedilol (COREG) tablet 12.5 mg  12.5 mg Oral BID WITH MEALS  diphenhydrAMINE (BENADRYL) capsule 25 mg  25 mg Oral Q6H PRN  
 albuterol-ipratropium (DUO-NEB) 2.5 MG-0.5 MG/3 ML  3 mL Nebulization Q4H RT  
 aspirin chewable tablet 81 mg  81 mg Oral DAILY  methylPREDNISolone (PF) (SOLU-MEDROL) injection 60 mg  60 mg IntraVENous Q6H  
 insulin lispro (HUMALOG) injection   SubCUTAneous AC&HS  
 glucose chewable tablet 16 g  4 Tab Oral PRN  
 dextrose (D50W) injection syrg 12.5-25 g  12.5-25 g IntraVENous PRN  
 glucagon (GLUCAGEN) injection 1 mg  1 mg IntraMUSCular PRN  
 nitroglycerin (NITROBID) 2 % ointment 1 Inch  1 Inch Topical BID  sodium chloride (NS) flush 5-10 mL  5-10 mL IntraVENous Q8H  
 sodium chloride (NS) flush 5-10 mL  5-10 mL IntraVENous PRN  
 acetaminophen (TYLENOL) tablet 650 mg  650 mg Oral Q4H PRN  
 oxyCODONE-acetaminophen (PERCOCET) 5-325 mg per tablet 1 Tab  1 Tab Oral Q4H PRN  
 HYDROmorphone (PF) (DILAUDID) injection 0.5 mg  0.5 mg IntraVENous Q4H PRN  prochlorperazine (COMPAZINE) injection 10 mg  10 mg IntraVENous Q6H PRN  
 zolpidem (AMBIEN) tablet 5 mg  5 mg Oral QHS PRN  
 enoxaparin (LOVENOX) injection 40 mg  40 mg SubCUTAneous Q24H  
 albuterol (PROVENTIL VENTOLIN) nebulizer solution 2.5 mg  2.5 mg Nebulization Q2H PRN  
 nicotine (NICODERM CQ) 21 mg/24 hr patch 1 Patch  1 Patch TransDERmal Q24H  
 guaiFENesin-codeine (ROBITUSSIN AC) 100-10 mg/5 mL solution 5 mL  5 mL Oral Q4H PRN  
 atorvastatin (LIPITOR) tablet 80 mg  80 mg Oral QHS OBJECTIVE Intake/Output Summary (Last 24 hours) at 11/19/2018 8475 Last data filed at 11/18/2018 1738 Gross per 24 hour Intake 1080 ml Output 1575 ml Net -495 ml Review of Systems - History obtained from the patient AS PER  HPI Telemetry NSR w occ PVCs PHYSICAL EXAM  
  
 
Visit Vitals /72 (BP 1 Location: Right arm, BP Patient Position: At rest) Pulse 73 Temp 97.7 °F (36.5 °C) Resp 16 Ht 5' 9\" (1.753 m) Wt 153 lb 3.5 oz (69.5 kg) SpO2 96% BMI 22.63 kg/m² Gen: Well-developed, obese, in no acute distress  alert and oriented x 3 HEENT:  Pink conjunctivae, Hearing grossly normal.No scleral icterus or conjunctival, moist mucous membranes Neck: Supple,No JVD Resp: No accessory muscle use, Clear breath sounds, No rales or rhonchi 
Card: Regular Rate,Rythm, 3/6 murmurs at left axillary/apex, no rubs or gallop. No thrills. GI:          soft, non-tender MSK: No cyanosis or clubbing, good capillary refill Skin: No rashes or ulcers, no bruising Neuro:  Cranial nerves are grossly intact, moving all four extremities, no focal deficit, follows commands appropriately Psych:  Good insight, oriented to person, place and time, alert, Nml Affect LE: No edema DATA REVIEW Laboratory and Imaging have been reviewed by me and are notable for Recent Labs 11/18/18 
0013 11/17/18 
1700 11/17/18 
0579 CPK 45 41  --   
CKMB 3.5 4.2*  --   
TROIQ 0.07* 0.10* 0.14* Recent Labs 11/19/18 
0505 11/18/18 
0013 11/17/18 
3379  140 142  
K 4.0 4.3 3.8 CO2 26 26 30 BUN 49* 38* 33* CREA 1.98* 1.72* 1.55* * 155* 174* PHOS  --  3.4  --   
MG 2.0 1.9  --   
WBC 14.3* 14.2* 16.0*  
HGB 12.7 13.2 12.9 HCT 38.8 40.4 40.1  192 200 [facility] NP

## 2018-11-19 NOTE — PROGRESS NOTES
Bedside and Verbal shift change report given to ANGÉLICA Johnson (oncoming nurse) by Jason Holliday (offgoing nurse). Report included the following information SBAR, Kardex, MAR, Accordion, Recent Results, Med Rec Status and Cardiac Rhythm nsr. Mr. Devonte Kurtz requested anti-insomna medication. Med admin and effective Bedside and Verbal shift change report given to ANGÉLICA Colón (oncoming nurse) by Maryjane Clark (offgoing nurse). Report included the following information SBAR, Kardex, Accordion, Recent Results, Med Rec Status and Cardiac Rhythm SR . Sinus arrythmia with pvc

## 2018-11-19 NOTE — PROGRESS NOTES
Terrell Varma [facility] [place] [facility] 
[address] [facility], [address] [place] [address] 
[phone] Medical Progress Note NAME: Jhon Salazar :  1955 MRM:  [id number] Date/Time: 2018 Assessment / Plan: Hypertensive urgency: complex case. BP remains high. Added clonidine, hydralazine, nitropaste. Norvasc increased. Bumex PO added. Continue Coreg. Renal artery duplex poor study, unable to visualize. Wheezing / dyspnea: multifactorial. Appreciate pulmonology evaluation. Now on albuterol nebs. Continue IV steroids. CXR showed no acute findings, but calcific right pleural plaque. Per cardiology has acute decompensated heart failure w/ preserved EF. BLE edema could be from severe proteinuria. Dopplers negative for DVT. Echo showed EF 65%, diastolic dysfunction. Cardiology considering [address] CAD (coronary artery disease) / PAD / carotid artery disease: appreciate cardiology and vascular surgery following. Needs outpatient follow up. Continue ASA, statin, BP control. Smoking cessation Elevated serum creatinine: CKD3 at baseline, a bit worse. Discussed with Dr. Daria Hung. Nephrology wishes to continue Bumex. 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 Was on 60mg prednisone outpatient, followed by Dr. Rabia Espinal. Follow BMP Hyperglycemia: meets criteria for DM, as A1c is 6.8%. New diagnosis. BG will be worse on steroids. Increase NPH. Continue SSI Leukocytosis: likely from leukocyte demargination from steroids. Follow CBC off antibiotics Total time spent: 35 minutes, d/w cards, nephrology Time spent in the care of this patient including reviewing records, discussing with nursing and/or other providers on the treatment team, obtaining history and examining the patient, and discussing treatment plans. Care Plan discussed with: Patient, Nursing Staff and >50% of time spent in counseling and coordination of care Discussed:  Care Plan Prophylaxis:  heparin Disposition:  Home w/Family Subjective: Chief Complaint:  Follow up elevated BP Chart/notes/labs/studies reviewed, patient examined at bedside. Denies CP. +SOB. No f/c. LE edema better Objective:  
 
 
Vitals:  
 
  
Last 24hrs VS reviewed since prior progress note. Most recent are: 
 
Visit Vitals /61 (BP 1 Location: Right arm, BP Patient Position: At rest) Pulse 73 Temp 97.5 °F (36.4 °C) Resp 18 Ht 5' 9\" (1.753 m) Wt 69.5 kg (153 lb 3.5 oz) SpO2 94% BMI 22.63 kg/m² SpO2 Readings from Last 6 Encounters:  
11/19/18 94% 02/15/17 94% 06/12/15 98% 06/08/15 100% 04/10/15 97% Intake/Output Summary (Last 24 hours) at 11/19/2018 1458 Last data filed at 11/19/2018 1353 Gross per 24 hour Intake 900 ml Output 1500 ml Net -600 ml Exam:  
 
Physical Exam: 
 
Gen:  Well-developed, well-nourished, in no acute distress. NAD HEENT:  Sclerae nonicteric, hearing intact to voice, mucous membranes moist 
Neck:  Supple, without masses. Resp:  No accessory muscle use, mildly diminished, no significant wheezing this morning. No rales or rhonchi 
Card: RRR, without m/r/g. 2+ BLE edema, L>R Abd:  +bowel sounds, soft, NTTP, nondistended. Neuro: Face symmetric, tongue midline, speech fluent, follows commands appropriately Psych:  Alert, oriented x 3. Good insight Medications Reviewed: (see below) Lab Data Reviewed: (see below) 
 
______________________________________________________________________ Medications:  
 
Current Facility-Administered Medications Medication Dose Route Frequency  insulin NPH (NOVOLIN N, HUMULIN N) injection 10 Units  10 Units SubCUTAneous ACB&D  cloNIDine HCl (CATAPRES) tablet 0.1 mg  0.1 mg Oral TID  bumetanide (BUMEX) tablet 1 mg  1 mg Oral DAILY  albuterol (ACCUNEB) nebulizer solution 1.25 mg  1.25 mg Nebulization Q4H RT  
  hydrALAZINE (APRESOLINE) 20 mg/mL injection 20 mg  20 mg IntraVENous Q6H PRN  
 amLODIPine (NORVASC) tablet 10 mg  10 mg Oral DAILY  carvedilol (COREG) tablet 12.5 mg  12.5 mg Oral BID WITH MEALS  diphenhydrAMINE (BENADRYL) capsule 25 mg  25 mg Oral Q6H PRN  
 aspirin chewable tablet 81 mg  81 mg Oral DAILY  methylPREDNISolone (PF) (SOLU-MEDROL) injection 60 mg  60 mg IntraVENous Q6H  
 insulin lispro (HUMALOG) injection   SubCUTAneous AC&HS  
 glucose chewable tablet 16 g  4 Tab Oral PRN  
 dextrose (D50W) injection syrg 12.5-25 g  12.5-25 g IntraVENous PRN  
 glucagon (GLUCAGEN) injection 1 mg  1 mg IntraMUSCular PRN  
 nitroglycerin (NITROBID) 2 % ointment 1 Inch  1 Inch Topical BID  sodium chloride (NS) flush 5-10 mL  5-10 mL IntraVENous Q8H  
 sodium chloride (NS) flush 5-10 mL  5-10 mL IntraVENous PRN  
 acetaminophen (TYLENOL) tablet 650 mg  650 mg Oral Q4H PRN  
 oxyCODONE-acetaminophen (PERCOCET) 5-325 mg per tablet 1 Tab  1 Tab Oral Q4H PRN  
 HYDROmorphone (PF) (DILAUDID) injection 0.5 mg  0.5 mg IntraVENous Q4H PRN  prochlorperazine (COMPAZINE) injection 10 mg  10 mg IntraVENous Q6H PRN  
 zolpidem (AMBIEN) tablet 5 mg  5 mg Oral QHS PRN  
 enoxaparin (LOVENOX) injection 40 mg  40 mg SubCUTAneous Q24H  
 albuterol (PROVENTIL VENTOLIN) nebulizer solution 2.5 mg  2.5 mg Nebulization Q2H PRN  
 nicotine (NICODERM CQ) 21 mg/24 hr patch 1 Patch  1 Patch TransDERmal Q24H  
 guaiFENesin-codeine (ROBITUSSIN AC) 100-10 mg/5 mL solution 5 mL  5 mL Oral Q4H PRN  
 atorvastatin (LIPITOR) tablet 80 mg  80 mg Oral QHS Lab Review:  
 
Recent Labs 11/19/18 
0505 11/18/18 
0013 11/17/18 
0355 WBC 14.3* 14.2* 16.0*  
HGB 12.7 13.2 12.9 HCT 38.8 40.4 40.1  192 200 Recent Labs 11/19/18 
0505 11/18/18 
0013 11/17/18 
8545  140 142  
K 4.0 4.3 3.8  104 104 CO2 26 26 30 * 155* 174* BUN 49* 38* 33* CREA 1.98* 1.72* 1.55* CA 8.7 8.3* 8.1*  
MG 2.0 1.9  --   
PHOS  --  3.4  --   
ALB  --   --  2.8* SGOT  --   --  18 ALT  --   --  34 No components found for: [place] No results for input(s): PH, PCO2, PO2, HCO3, FIO2 in the last 72 hours. No results for input(s): INR in the last 72 hours. No lab exists for component: INREXT, INREXT No results found for: SDES No results found for: CULT 
        
___________________________________________________ Attending Physician: Karla Felder MD

## 2018-11-19 NOTE — PROGRESS NOTES
Reason for Admission:  Hypertensive Emergency RRAT Score:      11 Plan for utilizing home health:      Not interested in home health Likelihood of Readmission:  Green/Low Transition of Care Plan:      Pt lives with his girlfriend Marsha Alonzo cell is [phone]. Pt lives in a one story home with 2 steps to enter the home. Pt drive s and is independent with all of his ADLs. Pt has [employer] He gets his prescriptions filled at [address] [facility]. Pt's PCP is Dr. Moriah Watt. Pt has never had home health before, DME - none. No other issues or concerns at this time. REGINALD Yeh Care Management Interventions PCP Verified by CM: Yes(Robert Barry) Natalee Signup: No 
Discharge Durable Medical Equipment: No 
Physical Therapy Consult: No 
Occupational Therapy Consult: No 
Speech Therapy Consult: No 
Current Support Network: Other Confirm Follow Up Transport: Family Plan discussed with Pt/Family/Caregiver: Yes Discharge Location Discharge Placement: Home

## 2018-11-19 NOTE — PROGRESS NOTES
Terrell Varma Dominique Kimberly [address]  YOB: 1955 Assessment & Plan: 1. CKD 3/ Presumtive FSGS ( Biopsty 1 glm) · Severe Proteinuria: 10 gm, here 9 gm · Prednisone : prescribed 60, he wastaking 40, resumed 20 mg tid: now on iv steroids for pulm reasons · Cr 1.6-1.9 mg per out pt labs · We will dw DR. Yolette Mares about PCP prophylaxis and GI prophylaxis 2. HTN urgency · Amlodipine 10, coreg 12.5 mg bid · Add loops for HTN control · Added Clonidine · May add Losartan in 1-2 days · Renal duplex: if normal: is this HTN due to steroids? · ACE Caused cough 3. Edema · CKD,Proteinuria,ccb · Duplex leg: no DVT · Diuretics 4. Smoker Subjective:  
CC:arf HPI: Patient seen CKD: Cr worse but still around his baseline 8-9 Gm proteinuria HTN worse despite more meds COugh and SOB worse. ROS:neg for 12 sys except above Current Facility-Administered Medications Medication Dose Route Frequency  insulin NPH (NOVOLIN N, HUMULIN N) injection 10 Units  10 Units SubCUTAneous ACB&D  cloNIDine HCl (CATAPRES) tablet 0.1 mg  0.1 mg Oral TID  bumetanide (BUMEX) tablet 1 mg  1 mg Oral DAILY  hydrALAZINE (APRESOLINE) 20 mg/mL injection 20 mg  20 mg IntraVENous Q6H PRN  
 amLODIPine (NORVASC) tablet 10 mg  10 mg Oral DAILY  carvedilol (COREG) tablet 12.5 mg  12.5 mg Oral BID WITH MEALS  diphenhydrAMINE (BENADRYL) capsule 25 mg  25 mg Oral Q6H PRN  
 albuterol-ipratropium (DUO-NEB) 2.5 MG-0.5 MG/3 ML  3 mL Nebulization Q4H RT  
 aspirin chewable tablet 81 mg  81 mg Oral DAILY  methylPREDNISolone (PF) (SOLU-MEDROL) injection 60 mg  60 mg IntraVENous Q6H  
 insulin lispro (HUMALOG) injection   SubCUTAneous AC&HS  
 glucose chewable tablet 16 g  4 Tab Oral PRN  
 dextrose (D50W) injection syrg 12.5-25 g  12.5-25 g IntraVENous PRN  
 glucagon (GLUCAGEN) injection 1 mg  1 mg IntraMUSCular PRN  
  nitroglycerin (NITROBID) 2 % ointment 1 Inch  1 Inch Topical BID  sodium chloride (NS) flush 5-10 mL  5-10 mL IntraVENous Q8H  
 sodium chloride (NS) flush 5-10 mL  5-10 mL IntraVENous PRN  
 acetaminophen (TYLENOL) tablet 650 mg  650 mg Oral Q4H PRN  
 oxyCODONE-acetaminophen (PERCOCET) 5-325 mg per tablet 1 Tab  1 Tab Oral Q4H PRN  
 HYDROmorphone (PF) (DILAUDID) injection 0.5 mg  0.5 mg IntraVENous Q4H PRN  prochlorperazine (COMPAZINE) injection 10 mg  10 mg IntraVENous Q6H PRN  
 zolpidem (AMBIEN) tablet 5 mg  5 mg Oral QHS PRN  
 enoxaparin (LOVENOX) injection 40 mg  40 mg SubCUTAneous Q24H  
 albuterol (PROVENTIL VENTOLIN) nebulizer solution 2.5 mg  2.5 mg Nebulization Q2H PRN  
 nicotine (NICODERM CQ) 21 mg/24 hr patch 1 Patch  1 Patch TransDERmal Q24H  
 guaiFENesin-codeine (ROBITUSSIN AC) 100-10 mg/5 mL solution 5 mL  5 mL Oral Q4H PRN  
 atorvastatin (LIPITOR) tablet 80 mg  80 mg Oral QHS Objective:  
 
Vitals: 
Blood pressure 172/72, pulse 73, temperature 97.7 °F (36.5 °C), resp. rate 16, height 5' 9\" (1.753 m), weight 69.5 kg (153 lb 3.5 oz), SpO2 96 %. Temp (24hrs), Av.8 °F (36.6 °C), Min:97.4 °F (36.3 °C), Max:98.3 °F (36.8 °C) Intake and Output: 
No intake/output data recorded.  1901 -  0700 In: 1560 [P.O.:1560] Out: 1575 [ASUIO:0657] Physical Exam:              
 Patient is intubated:  no 
 
Physical Examination:  
GENERAL ASSESSMENT: NAD HEENT:Nontraumatic CHEST: rhonchi HEART: S1S2 ABDOMEN: Soft,NT, 
:Maxwell: n EXTREMITY: EDEMA 1 NEURO:Grossly non focal 
 
 
   
ECG/rhythm: 
 
Data Review No results for input(s): TNIPOC in the last 72 hours. No lab exists for component: ITNL Recent Labs 18 
0013 18 
1700 18 
2849 CPK 45 41  --   
CKMB 3.5 4.2*  --   
TROIQ 0.07* 0.10* 0.14* Recent Labs 18 
0505 18 
0013 18 
0321  140 142  
K 4.0 4.3 3.8  104 104 CO2 26 26 30  
 BUN 49* 38* 33* CREA 1.98* 1.72* 1.55* * 155* 174* PHOS  --  3.4  --   
MG 2.0 1.9  --   
CA 8.7 8.3* 8.1* ALB  --   --  2.8* WBC 14.3* 14.2* 16.0*  
HGB 12.7 13.2 12.9 HCT 38.8 40.4 40.1  192 200 No results for input(s): INR, PTP, APTT in the last 72 hours. No lab exists for component: INREXT Needs: urine analysis, urine sodium, protein and creatinine Lab Results Component Value Date/Time Creatinine, urine 81.33 11/17/2018 11:20 AM  
 
 
 
Discussed with:  Colleague, Nursing 
 
: Candiss Goodell, MD 
11/19/2018 [facility]: 
[email or web address]. [employer] [facility].[employer] [place] office: 
[address], [address] [place], [address] [place] [address] Phone: [phone] Fax :     [phone] [place] office: 
[address] [facility], [address] Phone - [phone] Fax - [phone]

## 2018-11-19 NOTE — PROCEDURES
Luis  *** FINAL REPORT ***    Name: Geovani Merritt  MRN: [id number]    Inpatient  : 22 Aug 1955  HIS Order #: [id number]  [address] [facility] Visit #: [id number]  Date: 2018    TYPE OF TEST: Visceral Arterial Duplex    REASON FOR TEST  Eval for renal vascular HTN    Aortic PSV:       cm/s  Diameter AP:     cm   TV:     cm                   Right          Left  Renal Artery:- -------------  -------------  Proximal  PSV:  Mid       PSV:  Distal    PSV:  Aortic ratio :    Medullary PSV:            EDV:            EDR:            SDR:    Cortical  PSV:            EDV:            EDR:            SDR:  Stenosis:  Kidney size:   10.5 cm        10.7 cm               x  6.4 cm      x  6.4 cm    Hilar:-        Right          Left  Acc. Time  AT:     secs           secs  Acc. Index AI:             RI: 0.79           0.84    Mesenteric:-                  Prox   Mid   Dist Ratio Stenosis          Aneurysm                  ----- ----- ----- ----- ----------------- ------------  SMA:                                   Not visualized  Celiac:                                 Not visualized  Hepatic:  Splenic:  RIDGE:  :    INTERPRETATION/FINDINGS  PROCEDURE: Evaluation of renal arteries with ultrasound (B-mode  imaging, pulsed Doppler, color Doppler). Includes the aorta, celiac  artery, superior mesenteric artery, renal arteries, segmental  arteries, and renal vein. FINDINGS: Technically difficult exam due to patient bowel gas. The  aorta was not visualized. RENAL:  1. The right kidney measures 10.5 cm.  2. The left kidney measures 10.57cm. 3. Bilateral intrinsic/medical renal disease identified based on the  R. Is  4. The bilateral renal arteries were not visualized. MESENTERIC:  1. Unable to visualize the superior mesenteric artery. 2. Unable to visualize the celiac artery. ADDITIONAL COMMENTS    I have personally reviewed the data relevant to the interpretation of  this  study.     TECHNOLOGIST: Pipe Chiang  Signed: 11/19/2018 1:39:41 PM    PHYSICIAN: Rogers Talley.  Shakir Jin MD  Signed: 11/20/2018 2:38:39 PM

## 2018-11-19 NOTE — CONSULTS
Name: [facility]: [facility]   : 1955 Admit Date: 2018   Phone: [phone]  Room: [facility]/01   PCP: Dalila Limon MD  MRN: [id number]   Date: 2018  Code: Full Code          Chart and notes reviewed. Data reviewed. I review the patient's current medications in the medical record at each encounter. I have evaluated and examined the patient. HPI:    11:51 AM       History was obtained from patient and chart. I was asked by Margarita Marroquin MD to see Stacey Erin in consultation for a chief complaint of COPD exacerbation. Mr. Manuelito Powers is a pleasant 58year old male who presented to the [facility] ED 2 days ago with SOB and was admitted with hypertensive urgency. Wheezing noted following admission and patient has been placed on IV Solumedrol. Patient also with history of CAD and stage 3 CKD (biopsy proved FSGS). He has been prescribed 60mg prednisone outpatient by Renal, but was only taking 40mg. Patient reports being told he has COPD by a pulmonologist in [place] after having a chest CT scan. States he was not prescribed any nebs or inhalers. Was also told he had \"a lung plaque\" from asbestos. 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 He reports FIGUEROA and can wheeze at times with activity. Also report wheezing at times with coughing. Does not feel like the steroids are helping his SOB. Also reports the nebs are irritating and making him cough. CXR personally visualized. Lungs are borderline hyperinflated but otherwise clear with chronic right-sided calcific pleural plaque suspected. WBC 14.3  Hgb 12.7  Creat 1.98  proBNP 3465    ECHO: EF 93%; grade 2 diastolic dysfunction; mild MR    Carotid dopplers: Findings are consistent with 50-69% stenosis of the right mid internal carotid (tortuosity) and 0-49%stenosis of the left internal carotid. Vertebrals are patent with antegrade flow.      BLE VD negative for DVT    Past Medical History:   Diagnosis Date    Acute CVA (cerebrovascular accident) ([facility] 2/15/2017    Per clinical exam    CAD (coronary artery disease)     MI ; stenting x 2 in      Carotid artery disease ([facility]     Cough     Diplopia 2017    Fatigue     FSGS (focal segmental glomerulosclerosis)     Headache     History of CVA (cerebrovascular accident) 2017    CT head: chronic L BG infarct    Hyperlipidemia     Hypertension     Mitral regurgitation     Mod-severe MR in     Muscle weakness     [facility]VD [facility]peripheral vascular disease) ([facility])        Past Surgical History:   Procedure Laterality Date    CARDIAC SURG PROCEDURE UNLIST      4 cardiac stents, 3 stens to L leg, 2 stens R leg, 1 stent to abdomen    HX MOHS PROCEDURES Right 2015    HX ORTHOPAEDIC Left 2015    rotator cuff manipulation    NEUROLOGICAL PROCEDURE UNLISTED      EMG       Family History   Problem Relation Age of Onset    Heart Disease Brother        Social History     Tobacco Use    Smoking status: Current Every Day Smoker     Packs/day: 0.25     Types: Cigarettes     Last attempt to quit: 2016     Years since quittin.7    Smokeless tobacco: Current User   Substance Use Topics    Alcohol use:  Yes     Alcohol/week: 12.0 oz     Types: 24 Cans of beer per week     Comment: socially       Allergies   Allergen Reactions    Ace Inhibitors Cough       Current Facility-Administered Medications   Medication Dose Route Frequency    insulin NPH (NOVOLIN N, HUMULIN N) injection 10 Units  10 Units SubCUTAneous ACB&D    cloNIDine HCl (CATAPRES) tablet 0.1 mg  0.1 mg Oral TID    bumetanide (BUMEX) tablet 1 mg  1 mg Oral DAILY    hydrALAZINE (APRESOLINE) 20 mg/mL injection 20 mg  20 mg IntraVENous Q6H PRN    amLODIPine (NORVASC) tablet 10 mg  10 mg Oral DAILY    carvedilol (COREG) tablet 12.5 mg  12.5 mg Oral BID WITH MEALS    diphenhydrAMINE (BENADRYL) capsule 25 mg  25 mg Oral Q6H PRN    albuterol-ipratropium (DUO-NEB) 2.5 MG-0.5 MG/3 ML  3 mL Nebulization Q4H RT    aspirin chewable tablet 81 mg  81 mg Oral DAILY    methylPREDNISolone (PF) (SOLU-MEDROL) injection 60 mg  60 mg IntraVENous Q6H    insulin lispro (HUMALOG) injection   SubCUTAneous AC&HS    glucose chewable tablet 16 g  4 Tab Oral PRN    dextrose (D50W) injection syrg 12.5-25 g  12.5-25 g IntraVENous PRN    glucagon (GLUCAGEN) injection 1 mg  1 mg IntraMUSCular PRN    nitroglycerin (NITROBID) 2 % ointment 1 Inch  1 Inch Topical BID    sodium chloride (NS) flush 5-10 mL  5-10 mL IntraVENous Q8H    sodium chloride (NS) flush 5-10 mL  5-10 mL IntraVENous PRN    acetaminophen (TYLENOL) tablet 650 mg  650 mg Oral Q4H PRN    oxyCODONE-acetaminophen (PERCOCET) 5-325 mg per tablet 1 Tab  1 Tab Oral Q4H PRN    HYDROmorphone (PF) (DILAUDID) injection 0.5 mg  0.5 mg IntraVENous Q4H PRN    prochlorperazine (COMPAZINE) injection 10 mg  10 mg IntraVENous Q6H PRN    zolpidem (AMBIEN) tablet 5 mg  5 mg Oral QHS PRN    enoxaparin (LOVENOX) injection 40 mg  40 mg SubCUTAneous Q24H    albuterol (PROVENTIL VENTOLIN) nebulizer solution 2.5 mg  2.5 mg Nebulization Q2H PRN    nicotine (NICODERM CQ) 21 mg/24 hr patch 1 Patch  1 Patch TransDERmal Q24H    guaiFENesin-codeine (ROBITUSSIN AC) 100-10 mg/5 mL solution 5 mL  5 mL Oral Q4H PRN    atorvastatin (LIPITOR) tablet 80 mg  80 mg Oral QHS         REVIEW OF SYSTEMS   12 point ROS negative except as stated in the HPI. Physical Exam:   Visit Vitals  /70 (BP 1 Location: Right arm, BP Patient Position: At rest)   Pulse 76   Temp 97.7 °F (36.5 °C)   Resp 16   Ht 5' 9\" (1.753 m)   Wt 69.5 kg (153 lb 3.5 oz)   SpO2 96%   BMI 22.63 kg/m²       General:  Alert, cooperative, no distress, appears stated age. Head:  Normocephalic, without obvious abnormality, atraumatic. Eyes:  Conjunctivae/corneas clear. Nose: Nares normal. Septum midline.  Mucosa normal.    Throat: Lips, mucosa, and tongue normal.    Neck: Supple, symmetrical, trachea midline, no adenopathy. Lungs:   Diminished with trace exp wheeze. Chest wall:  No tenderness or deformity. Heart:  Regular rate and rhythm, S1, S2 normal, 2/6 murmur at apex, no click, rub or gallop. Abdomen:   Soft, non-tender. Bowel sounds normal. No masses,  No organomegaly. Extremities: Extremities normal, atraumatic, no cyanosis. 1-2+ LE pitting edema. Pulses: 2+ and symmetric all extremities. Skin: Skin color, texture, turgor normal. No rashes or lesions   Lymph nodes: Cervical, supraclavicular nodes normal.   Neurologic: Grossly nonfocal       Lab Results   Component Value Date/Time    Sodium 138 11/19/2018 05:05 AM    Potassium 4.0 11/19/2018 05:05 AM    Chloride 102 11/19/2018 05:05 AM    CO2 26 11/19/2018 05:05 AM    BUN 49 (H) 11/19/2018 05:05 AM    Creatinine 1.98 (H) 11/19/2018 05:05 AM    Glucose 206 (H) 11/19/2018 05:05 AM    Calcium 8.7 11/19/2018 05:05 AM    Magnesium 2.0 11/19/2018 05:05 AM    Phosphorus 3.4 11/18/2018 12:13 AM       Lab Results   Component Value Date/Time    WBC 14.3 (H) 11/19/2018 05:05 AM    HGB 12.7 11/19/2018 05:05 AM    PLATELET 981 45/68/2034 05:05 AM    MCV 91.1 11/19/2018 05:05 AM       Lab Results   Component Value Date/Time    AST (SGOT) 18 11/17/2018 02:43 AM    Alk.  phosphatase 115 11/17/2018 02:43 AM    Protein, total 6.0 (L) 11/17/2018 02:43 AM    Albumin 2.8 (L) 11/17/2018 02:43 AM    Globulin 3.2 11/17/2018 02:43 AM       No results found for: IRON, FE, TIBC, IBCT, PSAT, FERR    No results found for: SR, CRP, SP, ANAIGG, RA, RPR, RPRT, VDRLT, VDRLS, TSH, TSHEXT     No results found for: PH, PHI, PCO2, PCO2I, PO2, PO2I, HCO3, HCO3I, FIO2, FIO2I    Lab Results   Component Value Date/Time    CK 45 11/18/2018 12:13 AM    CK-MB Index 7.8 (H) 11/18/2018 12:13 AM    Troponin-I, Qt. 0.07 (H) 11/18/2018 12:13 AM        No results found for: CULT    No results found for: TOXA1, RPR, HBCM, HBSAG, HAAB, HCAB1, HAAT, G6PD, CRYAC, HIVGT, HIVR, HIV1, HIV12, HIVPC, HIVRPI    Lab Results   Component Value Date/Time    CK 45 11/18/2018 12:13 AM    CK 41 11/17/2018 05:00 PM       Lab Results   Component Value Date/Time    Color YELLOW/STRAW 11/17/2018 11:20 AM    Appearance CLEAR 11/17/2018 11:20 AM    pH (UA) 6.0 11/17/2018 11:20 AM    Protein >300 (A) 11/17/2018 11:20 AM    Glucose 250 (A) 11/17/2018 11:20 AM    Ketone NEGATIVE  11/17/2018 11:20 AM    Bilirubin NEGATIVE  11/17/2018 11:20 AM    Blood NEGATIVE  11/17/2018 11:20 AM    Urobilinogen 1.0 11/17/2018 11:20 AM    Nitrites NEGATIVE  11/17/2018 11:20 AM    Leukocyte Esterase NEGATIVE  11/17/2018 11:20 AM    WBC 0-4 11/17/2018 11:20 AM    RBC 0-5 11/17/2018 11:20 AM    Bacteria NEGATIVE  11/17/2018 11:20 AM       IMPRESSION  · Hypertensive urgency  · Dyspnea/wheezing with likely underlying COPD  · CAD  · CKD  · Hx of asbestosis: chronic pleural plaque on chest imaging  · Tobacco use    PLAN  · On RA. O2 if needed to keep sats > 90%  · BP control per primary team/Renal/Cards: on Coreg, Norvasc, clonidine, prn hydralazine. · Diuresis with Bumex per Cards/Renal  · Switch scheduled Duonebs to albuterol nebs to see if this will help with irritation/cough. Continue IV Solumedrol  · F/U renal duplex: if normal, concern HTN may be due to steroids  · Patient needs outpatient pulmonary follow up and PFTs  · Cardiac diet  · DVT prophylaxis: Lovenox  · Complete smoking cessation. Nicotine patch in [place]      Thank you for allowing us to participate in the care of this patient.       [place], [place]

## 2018-11-19 NOTE — NURSE NAVIGATOR
Chart reviewed by Heart Failure Nurse Navigator. Heart Failure database completed. Patient admitted with hypertensive urgency. Patient being treated as OP by nephrology for CKD3/presumptive FSGS. EF:  50% with wall thickness moderately to markedly increased, grade 2 diastolic dysfunction, mild MR. 
 c 
ACEi/ARB/ARNi: Per cardiology and nephrology,will consider adding ARB if appropriate after renal US. BB: Carvedilol 12.5 mg BID. Aldosterone Antagonist: not indicated. Amlodipine 10 mg daily. Clonidien 0.1 mg TID. CRT not indicated. NYHA Functional Class **. Heart Failure Teach Back in Patient Education. Heart Failure Avoiding Triggers on Discharge Instructions. Cardiologist: Last seen in 2015 by Dr. Geoff Bui discharge follow up phone call to be made within 48-72 hours of discharge.

## 2018-11-20 ENCOUNTER — APPOINTMENT (OUTPATIENT)
Dept: GENERAL RADIOLOGY | Age: 63
DRG: 304 | End: 2018-11-20
Attending: INTERNAL MEDICINE
Payer: MEDICARE

## 2018-11-20 LAB
ANION GAP SERPL CALC-SCNC: 8 MMOL/L (ref 5–15)
B PERT DNA SPEC QL NAA+PROBE: NOT DETECTED
BUN SERPL-MCNC: 67 MG/DL (ref 6–20)
BUN/CREAT SERPL: 32 (ref 12–20)
C PNEUM DNA SPEC QL NAA+PROBE: NOT DETECTED
CALCIUM SERPL-MCNC: 8.2 MG/DL (ref 8.5–10.1)
CHLORIDE SERPL-SCNC: 104 MMOL/L (ref 97–108)
CO2 SERPL-SCNC: 28 MMOL/L (ref 21–32)
CREAT SERPL-MCNC: 2.07 MG/DL (ref 0.7–1.3)
ERYTHROCYTE [DISTWIDTH] IN BLOOD BY AUTOMATED COUNT: 15.2 % (ref 11.5–14.5)
FLUAV H1 2009 PAND RNA SPEC QL NAA+PROBE: NOT DETECTED
FLUAV H1 RNA SPEC QL NAA+PROBE: NOT DETECTED
FLUAV H3 RNA SPEC QL NAA+PROBE: NOT DETECTED
FLUAV SUBTYP SPEC NAA+PROBE: NOT DETECTED
FLUBV RNA SPEC QL NAA+PROBE: NOT DETECTED
GLUCOSE BLD STRIP.AUTO-MCNC: 213 MG/DL (ref 65–100)
GLUCOSE BLD STRIP.AUTO-MCNC: 214 MG/DL (ref 65–100)
GLUCOSE BLD STRIP.AUTO-MCNC: 241 MG/DL (ref 65–100)
GLUCOSE BLD STRIP.AUTO-MCNC: 497 MG/DL (ref 65–100)
GLUCOSE BLD STRIP.AUTO-MCNC: 582 MG/DL (ref 65–100)
GLUCOSE SERPL-MCNC: 173 MG/DL (ref 65–100)
HADV DNA SPEC QL NAA+PROBE: NOT DETECTED
HCOV 229E RNA SPEC QL NAA+PROBE: NOT DETECTED
HCOV HKU1 RNA SPEC QL NAA+PROBE: NOT DETECTED
HCOV NL63 RNA SPEC QL NAA+PROBE: NOT DETECTED
HCOV OC43 RNA SPEC QL NAA+PROBE: NOT DETECTED
HCT VFR BLD AUTO: 35.2 % (ref 36.6–50.3)
HGB BLD-MCNC: 11.4 G/DL (ref 12.1–17)
HMPV RNA SPEC QL NAA+PROBE: NOT DETECTED
HPIV1 RNA SPEC QL NAA+PROBE: NOT DETECTED
HPIV2 RNA SPEC QL NAA+PROBE: NOT DETECTED
HPIV3 RNA SPEC QL NAA+PROBE: NOT DETECTED
HPIV4 RNA SPEC QL NAA+PROBE: NOT DETECTED
M PNEUMO DNA SPEC QL NAA+PROBE: NOT DETECTED
MAGNESIUM SERPL-MCNC: 2 MG/DL (ref 1.6–2.4)
MCH RBC QN AUTO: 29.6 PG (ref 26–34)
MCHC RBC AUTO-ENTMCNC: 32.4 G/DL (ref 30–36.5)
MCV RBC AUTO: 91.4 FL (ref 80–99)
NRBC # BLD: 0 K/UL (ref 0–0.01)
NRBC BLD-RTO: 0 PER 100 WBC
PLATELET # BLD AUTO: 188 K/UL (ref 150–400)
PMV BLD AUTO: 10.7 FL (ref 8.9–12.9)
POTASSIUM SERPL-SCNC: 3.7 MMOL/L (ref 3.5–5.1)
RBC # BLD AUTO: 3.85 M/UL (ref 4.1–5.7)
RSV RNA SPEC QL NAA+PROBE: NOT DETECTED
RV+EV RNA SPEC QL NAA+PROBE: NOT DETECTED
SERVICE CMNT-IMP: ABNORMAL
SODIUM SERPL-SCNC: 140 MMOL/L (ref 136–145)
WBC # BLD AUTO: 13.7 K/UL (ref 4.1–11.1)

## 2018-11-20 PROCEDURE — 74011250637 HC RX REV CODE- 250/637: Performed by: NURSE PRACTITIONER

## 2018-11-20 PROCEDURE — 65660000000 HC RM CCU STEPDOWN

## 2018-11-20 PROCEDURE — 74011250636 HC RX REV CODE- 250/636: Performed by: INTERNAL MEDICINE

## 2018-11-20 PROCEDURE — 94640 AIRWAY INHALATION TREATMENT: CPT

## 2018-11-20 PROCEDURE — 74011000250 HC RX REV CODE- 250: Performed by: PHYSICIAN ASSISTANT

## 2018-11-20 PROCEDURE — 36415 COLL VENOUS BLD VENIPUNCTURE: CPT

## 2018-11-20 PROCEDURE — 80048 BASIC METABOLIC PNL TOTAL CA: CPT

## 2018-11-20 PROCEDURE — 85027 COMPLETE CBC AUTOMATED: CPT

## 2018-11-20 PROCEDURE — 74011636637 HC RX REV CODE- 636/637: Performed by: INTERNAL MEDICINE

## 2018-11-20 PROCEDURE — 74011250637 HC RX REV CODE- 250/637: Performed by: SPECIALIST

## 2018-11-20 PROCEDURE — 74011250636 HC RX REV CODE- 250/636: Performed by: PHYSICIAN ASSISTANT

## 2018-11-20 PROCEDURE — 87798 DETECT AGENT NOS DNA AMP: CPT

## 2018-11-20 PROCEDURE — 74018 RADEX ABDOMEN 1 VIEW: CPT

## 2018-11-20 PROCEDURE — 82962 GLUCOSE BLOOD TEST: CPT

## 2018-11-20 PROCEDURE — 74011250637 HC RX REV CODE- 250/637: Performed by: INTERNAL MEDICINE

## 2018-11-20 PROCEDURE — 83735 ASSAY OF MAGNESIUM: CPT

## 2018-11-20 PROCEDURE — 74011000250 HC RX REV CODE- 250: Performed by: NURSE PRACTITIONER

## 2018-11-20 RX ORDER — NEBIVOLOL 5 MG/1
10 TABLET ORAL EVERY EVENING
Status: DISCONTINUED | OUTPATIENT
Start: 2018-11-20 | End: 2018-11-21

## 2018-11-20 RX ORDER — BUDESONIDE 0.5 MG/2ML
500 INHALANT ORAL
Status: DISCONTINUED | OUTPATIENT
Start: 2018-11-20 | End: 2018-11-21 | Stop reason: HOSPADM

## 2018-11-20 RX ORDER — POLYETHYLENE GLYCOL 3350 17 G/17G
17 POWDER, FOR SOLUTION ORAL DAILY
Status: DISCONTINUED | OUTPATIENT
Start: 2018-11-20 | End: 2018-11-21

## 2018-11-20 RX ORDER — CLONIDINE HYDROCHLORIDE 0.1 MG/1
0.2 TABLET ORAL EVERY 12 HOURS
Status: DISCONTINUED | OUTPATIENT
Start: 2018-11-20 | End: 2018-11-20

## 2018-11-20 RX ORDER — CLONIDINE HYDROCHLORIDE 0.1 MG/1
0.1 TABLET ORAL 3 TIMES DAILY
Status: DISCONTINUED | OUTPATIENT
Start: 2018-11-20 | End: 2018-11-21

## 2018-11-20 RX ORDER — DOCUSATE SODIUM 100 MG/1
100 CAPSULE, LIQUID FILLED ORAL 2 TIMES DAILY
Status: DISCONTINUED | OUTPATIENT
Start: 2018-11-20 | End: 2018-11-21 | Stop reason: HOSPADM

## 2018-11-20 RX ORDER — ISOSORBIDE MONONITRATE 30 MG/1
60 TABLET, EXTENDED RELEASE ORAL DAILY
Status: DISCONTINUED | OUTPATIENT
Start: 2018-11-20 | End: 2018-11-21 | Stop reason: HOSPADM

## 2018-11-20 RX ORDER — INSULIN LISPRO 100 [IU]/ML
INJECTION, SOLUTION INTRAVENOUS; SUBCUTANEOUS
Status: DISCONTINUED | OUTPATIENT
Start: 2018-11-20 | End: 2018-11-21 | Stop reason: HOSPADM

## 2018-11-20 RX ADMIN — AMLODIPINE BESYLATE 10 MG: 5 TABLET ORAL at 07:48

## 2018-11-20 RX ADMIN — BUDESONIDE 500 MCG: 0.5 INHALANT RESPIRATORY (INHALATION) at 19:42

## 2018-11-20 RX ADMIN — ALBUTEROL SULFATE 1.25 MG: 1.25 SOLUTION RESPIRATORY (INHALATION) at 07:44

## 2018-11-20 RX ADMIN — Medication 10 ML: at 05:50

## 2018-11-20 RX ADMIN — ISOSORBIDE MONONITRATE 60 MG: 30 TABLET, EXTENDED RELEASE ORAL at 13:26

## 2018-11-20 RX ADMIN — NITROGLYCERIN 1 INCH: 20 OINTMENT TOPICAL at 07:48

## 2018-11-20 RX ADMIN — HEPARIN SODIUM 5000 UNITS: 5000 INJECTION INTRAVENOUS; SUBCUTANEOUS at 09:29

## 2018-11-20 RX ADMIN — ATORVASTATIN CALCIUM 80 MG: 20 TABLET, FILM COATED ORAL at 20:31

## 2018-11-20 RX ADMIN — INSULIN HUMAN 10 UNITS: 100 INJECTION, SUSPENSION SUBCUTANEOUS at 07:46

## 2018-11-20 RX ADMIN — POLYETHYLENE GLYCOL 3350 17 G: 17 POWDER, FOR SOLUTION ORAL at 11:51

## 2018-11-20 RX ADMIN — ALBUTEROL SULFATE 1.25 MG: 1.25 SOLUTION RESPIRATORY (INHALATION) at 00:07

## 2018-11-20 RX ADMIN — BUDESONIDE 500 MCG: 0.5 INHALANT RESPIRATORY (INHALATION) at 15:23

## 2018-11-20 RX ADMIN — INSULIN LISPRO 3 UNITS: 100 INJECTION, SOLUTION INTRAVENOUS; SUBCUTANEOUS at 07:46

## 2018-11-20 RX ADMIN — DOCUSATE SODIUM 100 MG: 100 CAPSULE, LIQUID FILLED ORAL at 11:50

## 2018-11-20 RX ADMIN — METHYLPREDNISOLONE SODIUM SUCCINATE 60 MG: 40 INJECTION, POWDER, FOR SOLUTION INTRAMUSCULAR; INTRAVENOUS at 05:47

## 2018-11-20 RX ADMIN — METHYLPREDNISOLONE SODIUM SUCCINATE 60 MG: 125 INJECTION, POWDER, FOR SOLUTION INTRAMUSCULAR; INTRAVENOUS at 13:25

## 2018-11-20 RX ADMIN — ALBUTEROL SULFATE 1.25 MG: 1.25 SOLUTION RESPIRATORY (INHALATION) at 12:41

## 2018-11-20 RX ADMIN — NEBIVOLOL HYDROCHLORIDE 10 MG: 5 TABLET ORAL at 19:24

## 2018-11-20 RX ADMIN — INSULIN LISPRO 12 UNITS: 100 INJECTION, SOLUTION INTRAVENOUS; SUBCUTANEOUS at 11:51

## 2018-11-20 RX ADMIN — INSULIN LISPRO 2 UNITS: 100 INJECTION, SOLUTION INTRAVENOUS; SUBCUTANEOUS at 21:19

## 2018-11-20 RX ADMIN — BUMETANIDE 1 MG: 1 TABLET ORAL at 07:49

## 2018-11-20 RX ADMIN — ASPIRIN 81 MG 81 MG: 81 TABLET ORAL at 07:48

## 2018-11-20 RX ADMIN — DOCUSATE SODIUM 100 MG: 100 CAPSULE, LIQUID FILLED ORAL at 18:39

## 2018-11-20 RX ADMIN — ALBUTEROL SULFATE 1.25 MG: 1.25 SOLUTION RESPIRATORY (INHALATION) at 19:42

## 2018-11-20 RX ADMIN — CLONIDINE HYDROCHLORIDE 0.1 MG: 0.1 TABLET ORAL at 07:49

## 2018-11-20 RX ADMIN — ALBUTEROL SULFATE 1.25 MG: 1.25 SOLUTION RESPIRATORY (INHALATION) at 15:23

## 2018-11-20 RX ADMIN — Medication 10 ML: at 13:27

## 2018-11-20 RX ADMIN — CLONIDINE HYDROCHLORIDE 0.1 MG: 0.1 TABLET ORAL at 20:31

## 2018-11-20 RX ADMIN — Medication 10 ML: at 20:32

## 2018-11-20 RX ADMIN — METHYLPREDNISOLONE SODIUM SUCCINATE 60 MG: 125 INJECTION, POWDER, FOR SOLUTION INTRAMUSCULAR; INTRAVENOUS at 20:31

## 2018-11-20 RX ADMIN — GUAIFENESIN AND CODEINE PHOSPHATE 5 ML: 100; 10 SOLUTION ORAL at 06:00

## 2018-11-20 RX ADMIN — LABETALOL HCL 200 MG: 200 TABLET, FILM COATED ORAL at 07:48

## 2018-11-20 RX ADMIN — CLONIDINE HYDROCHLORIDE 0.1 MG: 0.1 TABLET ORAL at 16:45

## 2018-11-20 RX ADMIN — INSULIN HUMAN 20 UNITS: 100 INJECTION, SUSPENSION SUBCUTANEOUS at 16:45

## 2018-11-20 RX ADMIN — HEPARIN SODIUM 5000 UNITS: 5000 INJECTION INTRAVENOUS; SUBCUTANEOUS at 18:39

## 2018-11-20 RX ADMIN — INSULIN LISPRO 214 UNITS: 100 INJECTION, SOLUTION INTRAVENOUS; SUBCUTANEOUS at 16:45

## 2018-11-20 NOTE — PROGRESS NOTES
Name: [facility]: [address]  
: 1955 Admit Date: 2018 Phone: [phone]  Room: [facility]/ PCP: Damari Will MD  MRN: [id number] Date: 2018  Code: Full Code Chart and notes reviewed. Data reviewed. I review the patient's current medications in the medical record at each encounter. I have evaluated and examined the patient. Overnight events Afebrile Sats 92% on RA 
BP remains elevated, but overall improved from admission WBC 13.7 Creat 2.07 - worse Renal duplex was poor study due to bowel gas and unable to visualize. ROS: Feeling a little better this morning. Still with SOB when he coughs. Cough is better after Atrovent nebs d'c'd. Admits that he gets anxious when he thinks about being SOB and that makes his dyspnea worse. Denies CP. Denies abd pain. Denies LE pain/swelling. Past Medical History:  
Diagnosis Date  Acute CVA (cerebrovascular accident) ([address] 2/15/2017 Per clinical exam  
 CAD (coronary artery disease) MI ; stenting x 2 in   Carotid artery disease ([address]  Cough  Diplopia 2017  Fatigue  FSGS (focal segmental glomerulosclerosis)  Headache  History of CVA (cerebrovascular accident) 2017 CT head: chronic L BG infarct  Hyperlipidemia  Hypertension  Mitral regurgitation Mod-severe MR in   Muscle weakness  PVD (peripheral vascular disease) ([address] Past Surgical History:  
Procedure Laterality Date  CARDIAC SURG PROCEDURE UNLIST 4 cardiac stents, 3 stens to L leg, 2 stens R leg, 1 stent to abdomen  HX MOHS PROCEDURES Right 2015  HX ORTHOPAEDIC Left 2015  
 rotator cuff manipulation  NEUROLOGICAL PROCEDURE UNLISTED    
 EMG Family History Problem Relation Age of Onset  Heart Disease Brother Social History Tobacco Use  Smoking status: Current Every Day Smoker   Packs/day: 0.25  
 Types: Cigarettes Last attempt to quit: 2016 Years since quittin.7  Smokeless tobacco: Current User Substance Use Topics  Alcohol use: Yes Alcohol/week: 12.0 oz Types: 24 Cans of beer per week Comment: socially Allergies Allergen Reactions  Ace Inhibitors Cough Current Facility-Administered Medications Medication Dose Route Frequency  insulin NPH (NOVOLIN N, HUMULIN N) injection 10 Units  10 Units SubCUTAneous ACB&D  
 bumetanide (BUMEX) tablet 1 mg  1 mg Oral DAILY  albuterol (ACCUNEB) nebulizer solution 1.25 mg  1.25 mg Nebulization Q4H RT  
 heparin (porcine) injection 5,000 Units  5,000 Units SubCUTAneous Q8H  
 cloNIDine HCl (CATAPRES) tablet 0.1 mg  0.1 mg Oral TID  labetalol (NORMODYNE) tablet 200 mg  200 mg Oral Q12H  hydrALAZINE (APRESOLINE) 20 mg/mL injection 20 mg  20 mg IntraVENous Q6H PRN  
 amLODIPine (NORVASC) tablet 10 mg  10 mg Oral DAILY  diphenhydrAMINE (BENADRYL) capsule 25 mg  25 mg Oral Q6H PRN  
 aspirin chewable tablet 81 mg  81 mg Oral DAILY  methylPREDNISolone (PF) (SOLU-MEDROL) injection 60 mg  60 mg IntraVENous Q6H  
 insulin lispro (HUMALOG) injection   SubCUTAneous AC&HS  
 glucose chewable tablet 16 g  4 Tab Oral PRN  
 dextrose (D50W) injection syrg 12.5-25 g  12.5-25 g IntraVENous PRN  
 glucagon (GLUCAGEN) injection 1 mg  1 mg IntraMUSCular PRN  
 nitroglycerin (NITROBID) 2 % ointment 1 Inch  1 Inch Topical BID  sodium chloride (NS) flush 5-10 mL  5-10 mL IntraVENous Q8H  
 sodium chloride (NS) flush 5-10 mL  5-10 mL IntraVENous PRN  
 acetaminophen (TYLENOL) tablet 650 mg  650 mg Oral Q4H PRN  
 oxyCODONE-acetaminophen (PERCOCET) 5-325 mg per tablet 1 Tab  1 Tab Oral Q4H PRN  
 HYDROmorphone (PF) (DILAUDID) injection 0.5 mg  0.5 mg IntraVENous Q4H PRN  prochlorperazine (COMPAZINE) injection 10 mg  10 mg IntraVENous Q6H PRN  
 zolpidem (AMBIEN) tablet 5 mg  5 mg Oral QHS PRN  
  albuterol (PROVENTIL VENTOLIN) nebulizer solution 2.5 mg  2.5 mg Nebulization Q2H PRN  
 nicotine (NICODERM CQ) 21 mg/24 hr patch 1 Patch  1 Patch TransDERmal Q24H  
 guaiFENesin-codeine (ROBITUSSIN AC) 100-10 mg/5 mL solution 5 mL  5 mL Oral Q4H PRN  
 atorvastatin (LIPITOR) tablet 80 mg  80 mg Oral QHS REVIEW OF SYSTEMS  
12 point ROS negative except as stated in the HPI. Physical Exam:  
Visit Vitals /85 Pulse 75 Temp 97.6 °F (36.4 °C) Resp 19 Ht 5' 9\" (1.753 m) Wt 70.3 kg (155 lb) SpO2 92% BMI 22.89 kg/m² General:  Alert, cooperative, no distress, appears stated age. Head:  Normocephalic, without obvious abnormality, atraumatic. Eyes:  Conjunctivae/corneas clear. Nose: Nares normal. Septum midline. Mucosa normal.   
Throat: Lips, mucosa, and tongue normal.   
Neck: Supple, symmetrical, trachea midline, no adenopathy. Lungs:   Diminished with mild exp wheeze. Chest wall:  No tenderness or deformity. Heart:  Regular rate and rhythm, S1, S2 normal, 2/6 murmur at apex, no click, rub or gallop. Abdomen:   Soft, non-tender. Bowel sounds normal. No masses,  No organomegaly. Extremities: Extremities normal, atraumatic, no cyanosis. 1-2+ LE pitting edema. Pulses: 2+ and symmetric all extremities. Skin: Skin color, texture, turgor normal. No rashes or lesions Lymph nodes: Cervical, supraclavicular nodes normal.  
Neurologic: Grossly nonfocal  
 
 
Lab Results Component Value Date/Time Sodium 140 11/20/2018 12:54 AM  
 Potassium 3.7 11/20/2018 12:54 AM  
 Chloride 104 11/20/2018 12:54 AM  
 CO2 28 11/20/2018 12:54 AM  
 BUN 67 (H) 11/20/2018 12:54 AM  
 Creatinine 2.07 (H) 11/20/2018 12:54 AM  
 Glucose 173 (H) 11/20/2018 12:54 AM  
 Calcium 8.2 (L) 11/20/2018 12:54 AM  
 Magnesium 2.0 11/20/2018 12:54 AM  
 Phosphorus 3.4 11/18/2018 12:13 AM  
 
 
Lab Results Component Value Date/Time  WBC 13.7 (H) 11/20/2018 12:54 AM  
 HGB 11.4 (L) 11/20/2018 12:54 AM  
 PLATELET 668 01/60/7692 12:54 AM  
 MCV 91.4 11/20/2018 12:54 AM  
 
 
Lab Results Component Value Date/Time AST (SGOT) 18 11/17/2018 02:43 AM  
 Alk. phosphatase 115 11/17/2018 02:43 AM  
 Protein, total 6.0 (L) 11/17/2018 02:43 AM  
 Albumin 2.8 (L) 11/17/2018 02:43 AM  
 Globulin 3.2 11/17/2018 02:43 AM  
 
 
No results found for: IRON, FE, TIBC, IBCT, PSAT, FERR No results found for: SR, CRP, SP, ANAIGG, RA, RPR, RPRT, VDRLT, VDRLS, TSH, TSHEXT, TSHEXT No results found for: PH, PHI, PCO2, PCO2I, PO2, PO2I, HCO3, HCO3I, FIO2, FIO2I Lab Results Component Value Date/Time CK 45 11/18/2018 12:13 AM  
 CK-MB Index 7.8 (H) 11/18/2018 12:13 AM  
 Troponin-I, Qt. 0.07 (H) 11/18/2018 12:13 AM  
  
 
No results found for: CULT No results found for: TOXA1, RPR, HBCM, HBSAG, HAAB, HCAB1, HAAT, G6PD, CRYAC, HIVGT, HIVR, HIV1, HIV12, HIVPC, HIVRPI Lab Results Component Value Date/Time CK 45 11/18/2018 12:13 AM  
 CK 41 11/17/2018 05:00 PM  
 
 
Lab Results Component Value Date/Time Color YELLOW/STRAW 11/17/2018 11:20 AM  
 Appearance CLEAR 11/17/2018 11:20 AM  
 pH (UA) 6.0 11/17/2018 11:20 AM  
 Protein >300 (A) 11/17/2018 11:20 AM  
 Glucose 250 (A) 11/17/2018 11:20 AM  
 Ketone NEGATIVE  11/17/2018 11:20 AM  
 Bilirubin NEGATIVE  11/17/2018 11:20 AM  
 Blood NEGATIVE  11/17/2018 11:20 AM  
 Urobilinogen 1.0 11/17/2018 11:20 AM  
 Nitrites NEGATIVE  11/17/2018 11:20 AM  
 Leukocyte Esterase NEGATIVE  11/17/2018 11:20 AM  
 WBC 0-4 11/17/2018 11:20 AM  
 RBC 0-5 11/17/2018 11:20 AM  
 Bacteria NEGATIVE  11/17/2018 11:20 AM  
 
 
Images: no new images this morning IMPRESSION 
· Hypertensive urgency · Dyspnea/wheezing with likely underlying COPD · CAD · CKD/proteinuria · Hx of asbestosis: chronic pleural plaque on chest imaging · Tobacco use PLAN 
· On RA. O2 if needed to keep sats > 90% · BP control per primary team/Renal/Cards: on labetalol, Norvasc, clonidine, prn hydralazine. · Diuresis with Bumex per Cards/Renal; watch creat · Continue albuterol and wean IV Solumedrol to 60 mg q 8hr · Add on Pulmicort nebs · Potential RHC per cardiology · Check RVP · Patient needs outpatient pulmonary follow up and PFTs · Cardiac diet · DVT prophylaxis: sub q heparin · Complete smoking cessation. Nicotine patch in [place], [place]

## 2018-11-20 NOTE — PROGRESS NOTES
Spiritual Care Partner Volunteer visited patient in [facility] on 11.20. 18. Documented by: 
 
[id number] Celestino Magallanes M.Div, M.S, [address] [place], [place] [address] [facility] available at [address] [place] [id number])

## 2018-11-20 NOTE — PROGRESS NOTES
Terrell Varma [facility] [address] YOB: 1955 Assessment & Plan: 1. CKD 3/ Presumtive FSGS ( Biopsty 1 glm) · Severe Proteinuria: 10 gm, here 9 gm · Cr slightly worse with loops and better BP control · Prednisone : prescribed 60, he wastaking 40, resumed 20 mg tid: now on iv steroids for pulm reasons · Cr 1.6-1.9 mg per out pt labs · We will dw DR. [place] Matter about PCP prophylaxis and GI prophylaxis 2. HTN urgency · Amlodipine 10, coreg 12.5 mg bid,Clonidine,Bumex · May add Losartan in 1-2 days:not today · Renal duplex:not able to see renal 
· ACE Caused cough 3. Edema · CKD,Proteinuria,ccb · Duplex leg: no DVT · Diuretics 4. Smoker Subjective:  
CC:arf HPI: Patient seen CKD: Cr 2. Renal duplex rev BP better Edema better ROS:neg for 12 sys except above Current Facility-Administered Medications Medication Dose Route Frequency  methylPREDNISolone (PF) (SOLU-MEDROL) injection 60 mg  60 mg IntraVENous Q8H  
 polyethylene glycol (MIRALAX) packet 17 g  17 g Oral DAILY  docusate sodium (COLACE) capsule 100 mg  100 mg Oral BID  insulin NPH (NOVOLIN N, HUMULIN N) injection 20 Units  20 Units SubCUTAneous ACB&D  
 isosorbide mononitrate ER (IMDUR) tablet 60 mg  60 mg Oral DAILY  insulin lispro (HUMALOG) injection   SubCUTAneous AC&HS  bumetanide (BUMEX) tablet 1 mg  1 mg Oral DAILY  albuterol (ACCUNEB) nebulizer solution 1.25 mg  1.25 mg Nebulization Q4H RT  
 heparin (porcine) injection 5,000 Units  5,000 Units SubCUTAneous Q8H  
 cloNIDine HCl (CATAPRES) tablet 0.1 mg  0.1 mg Oral TID  labetalol (NORMODYNE) tablet 200 mg  200 mg Oral Q12H  hydrALAZINE (APRESOLINE) 20 mg/mL injection 20 mg  20 mg IntraVENous Q6H PRN  
 amLODIPine (NORVASC) tablet 10 mg  10 mg Oral DAILY  diphenhydrAMINE (BENADRYL) capsule 25 mg  25 mg Oral Q6H PRN  
 aspirin chewable tablet 81 mg  81 mg Oral DAILY  glucose chewable tablet 16 g  4 Tab Oral PRN  
 dextrose (D50W) injection syrg 12.5-25 g  12.5-25 g IntraVENous PRN  
 glucagon (GLUCAGEN) injection 1 mg  1 mg IntraMUSCular PRN  
 sodium chloride (NS) flush 5-10 mL  5-10 mL IntraVENous Q8H  
 sodium chloride (NS) flush 5-10 mL  5-10 mL IntraVENous PRN  
 acetaminophen (TYLENOL) tablet 650 mg  650 mg Oral Q4H PRN  
 oxyCODONE-acetaminophen (PERCOCET) 5-325 mg per tablet 1 Tab  1 Tab Oral Q4H PRN  
 HYDROmorphone (PF) (DILAUDID) injection 0.5 mg  0.5 mg IntraVENous Q4H PRN  prochlorperazine (COMPAZINE) injection 10 mg  10 mg IntraVENous Q6H PRN  
 zolpidem (AMBIEN) tablet 5 mg  5 mg Oral QHS PRN  
 albuterol (PROVENTIL VENTOLIN) nebulizer solution 2.5 mg  2.5 mg Nebulization Q2H PRN  
 nicotine (NICODERM CQ) 21 mg/24 hr patch 1 Patch  1 Patch TransDERmal Q24H  
 guaiFENesin-codeine (ROBITUSSIN AC) 100-10 mg/5 mL solution 5 mL  5 mL Oral Q4H PRN  
 atorvastatin (LIPITOR) tablet 80 mg  80 mg Oral QHS Objective:  
 
Vitals: 
Blood pressure 152/59, pulse 64, temperature 98 °F (36.7 °C), resp. rate 20, height 5' 9\" (1.753 m), weight 70.3 kg (155 lb), SpO2 97 %. Temp (24hrs), Av.7 °F (36.5 °C), Min:97.5 °F (36.4 °C), Max:98 °F (36.7 °C) Intake and Output: 
701 - 1900 In: 26 [P.O.:576] Out: -  
1901 - 700 In: 520 [P.O.:520] Out:  [DLHIB:8286] Physical Exam:              
 Patient is intubated:  no 
 
Physical Examination:  
GENERAL ASSESSMENT: NAD HEENT:Nontraumatic CHEST: rhonchi HEART: S1S2 ABDOMEN: Soft,NT, 
:Maxwell: n EXTREMITY: EDEMA  better NEURO:Grossly non focal 
 
 
   
ECG/rhythm: 
 
Data Review No results for input(s): TNIPOC in the last 72 hours. No lab exists for component: ITNL Recent Labs 18 
0013 18 
1700 CPK 45 41 CKMB 3.5 4.2*  
TROIQ 0.07* 0.10* Recent Labs  
  18 
0054 18 
0505 18 
0013  138 140  
K 3.7 4.0 4.3  102 104 CO2 28 26 26 BUN 67* 49* 38* CREA 2.07* 1.98* 1.72* * 206* 155* PHOS  --   --  3.4 MG 2.0 2.0 1.9 CA 8.2* 8.7 8.3* WBC 13.7* 14.3* 14.2* HGB 11.4* 12.7 13.2 HCT 35.2* 38.8 40.4  196 192 No results for input(s): INR, PTP, APTT in the last 72 hours. No lab exists for component: INREXT, INREXT Needs: urine analysis, urine sodium, protein and creatinine Lab Results Component Value Date/Time Creatinine, urine 81.33 11/17/2018 11:20 AM  
 
 
 
Discussed with:  Colleague, Nursing 
 
: Katt Pinzon MD 
11/20/2018 [facility]: 
[email or web address] MilwaukeerologyassEncompass [facility] Hospital [facility]onaates. [facility] [email or web address] [employer] office: 
[address], [address] Phone: [phone] Fax :     [phone] [place] office: 
[address] [facility], [address] [facility] Phone - [phone] Fax - [phone]

## 2018-11-20 NOTE — PROGRESS NOTES
Terrell Varma [facility] [address] 
[address] [facility], [address] [place] [address] 
[phone] Medical Progress Note NAME: Stacey Khan :  1955 MRM:  [id number] Date/Time: 2018 Assessment / Plan: Hypertensive urgency: Complex case. BP remains high despite multiple agents added. Now on clonidine, hydralazine,  Norvasc, Bumex. Coreg changed to labetalol. Nitropaste changed to ISMN, Renal artery duplex poor study, unable to visualize. Discussed with cards. Wheezing / dyspnea: multifactorial. Appreciate pulmonology evaluation. Now on albuterol nebs. Continue IV steroids. CXR showed no acute findings, but calcific right pleural plaque. Per cardiology has acute decompensated heart failure w/ preserved EF. BLE edema could be from severe proteinuria. Dopplers negative for DVT. Echo showed EF 80%, diastolic dysfunction. Elevated serum creatinine: CKD3 at baseline, Cr trending up slightly. Nephrology wishes to continue Bumex. 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 Was on 60mg prednisone outpatient, followed by Dr. Vijay Blair. Follow BMP  
 
  CAD (coronary artery disease) / PAD / carotid artery disease: appreciate cardiology and vascular surgery following. Needs outpatient follow up. Continue ASA, statin, BP control as above. Smoking cessation Hyperglycemia: meets criteria for DM, as A1c is 6.8%. New diagnosis. BG worse on steroids. Added NPH (new), increase dose. Increase SSI to high dose Leukocytosis: likely from leukocyte demargination from steroids. Follow closely off antibiotics Constipation: normal for pt. Check KUB. Start bowel regimen Total time spent: 35 minutes, d/w cards Time spent in the care of this patient including reviewing records, discussing with nursing and/or other providers on the treatment team, obtaining history and examining the patient, and discussing treatment plans. Care Plan discussed with: Patient, Nursing Staff and >50% of time spent in counseling and coordination of care Discussed:  Care Plan Prophylaxis:  heparin Disposition:  Home w/Family Subjective: Chief Complaint:  Follow up elevated BP Chart/notes/labs/studies reviewed, patient examined at bedside. Denies CP. +SOB. +wheezing. No f/c. LE edema improving Objective:  
 
 
Vitals:  
 
  
Last 24hrs VS reviewed since prior progress note. Most recent are: 
 
Visit Vitals /66 (BP 1 Location: Right arm, BP Patient Position: At rest) Pulse 65 Temp 97.4 °F (36.3 °C) Resp 17 Ht 5' 9\" (1.753 m) Wt 70.3 kg (155 lb) SpO2 98% BMI 22.89 kg/m² SpO2 Readings from Last 6 Encounters:  
11/20/18 98% 02/15/17 94% 06/12/15 98% 06/08/15 100% 04/10/15 97% Intake/Output Summary (Last 24 hours) at 11/20/2018 1559 Last data filed at 11/20/2018 6871 Gross per 24 hour Intake 796 ml Output 1200 ml Net -404 ml Exam:  
 
Physical Exam: 
 
Gen:  Well-developed, well-nourished, in no acute distress. NAD HEENT:  Sclerae nonicteric, hearing intact to voice, mucous membranes moist 
Neck:  Supple, without masses. Resp:  No accessory muscle use, mildly diminished, wheezing this morning. No rales or rhonchi 
Card: RRR, without m/r/g. 1-2+ BLE edema, L>R Abd:  +bowel sounds, soft, NTTP, nondistended. Neuro: Face symmetric, tongue midline, speech fluent, follows commands appropriately Psych:  Alert, oriented x 3. Good insight Medications Reviewed: (see below) Lab Data Reviewed: (see below) 
 
______________________________________________________________________ Medications:  
 
Current Facility-Administered Medications Medication Dose Route Frequency  methylPREDNISolone (PF) (SOLU-MEDROL) injection 60 mg  60 mg IntraVENous Q8H  
 polyethylene glycol (MIRALAX) packet 17 g  17 g Oral DAILY  docusate sodium (COLACE) capsule 100 mg  100 mg Oral BID  insulin NPH (NOVOLIN N, HUMULIN N) injection 20 Units  20 Units SubCUTAneous ACB&D  
 isosorbide mononitrate ER (IMDUR) tablet 60 mg  60 mg Oral DAILY  insulin lispro (HUMALOG) injection   SubCUTAneous AC&HS  budesonide (PULMICORT) 500 mcg/2 ml nebulizer suspension  500 mcg Nebulization BID RT  
 bumetanide (BUMEX) tablet 1 mg  1 mg Oral DAILY  albuterol (ACCUNEB) nebulizer solution 1.25 mg  1.25 mg Nebulization Q4H RT  
 heparin (porcine) injection 5,000 Units  5,000 Units SubCUTAneous Q8H  
 cloNIDine HCl (CATAPRES) tablet 0.1 mg  0.1 mg Oral TID  labetalol (NORMODYNE) tablet 200 mg  200 mg Oral Q12H  hydrALAZINE (APRESOLINE) 20 mg/mL injection 20 mg  20 mg IntraVENous Q6H PRN  
 amLODIPine (NORVASC) tablet 10 mg  10 mg Oral DAILY  diphenhydrAMINE (BENADRYL) capsule 25 mg  25 mg Oral Q6H PRN  
 aspirin chewable tablet 81 mg  81 mg Oral DAILY  glucose chewable tablet 16 g  4 Tab Oral PRN  
 dextrose (D50W) injection syrg 12.5-25 g  12.5-25 g IntraVENous PRN  
 glucagon (GLUCAGEN) injection 1 mg  1 mg IntraMUSCular PRN  
 sodium chloride (NS) flush 5-10 mL  5-10 mL IntraVENous Q8H  
 sodium chloride (NS) flush 5-10 mL  5-10 mL IntraVENous PRN  
 acetaminophen (TYLENOL) tablet 650 mg  650 mg Oral Q4H PRN  
 oxyCODONE-acetaminophen (PERCOCET) 5-325 mg per tablet 1 Tab  1 Tab Oral Q4H PRN  
 HYDROmorphone (PF) (DILAUDID) injection 0.5 mg  0.5 mg IntraVENous Q4H PRN  prochlorperazine (COMPAZINE) injection 10 mg  10 mg IntraVENous Q6H PRN  
 zolpidem (AMBIEN) tablet 5 mg  5 mg Oral QHS PRN  
 albuterol (PROVENTIL VENTOLIN) nebulizer solution 2.5 mg  2.5 mg Nebulization Q2H PRN  
 nicotine (NICODERM CQ) 21 mg/24 hr patch 1 Patch  1 Patch TransDERmal Q24H  
 guaiFENesin-codeine (ROBITUSSIN AC) 100-10 mg/5 mL solution 5 mL  5 mL Oral Q4H PRN  
 atorvastatin (LIPITOR) tablet 80 mg  80 mg Oral QHS Lab Review: Recent Labs  
  11/20/18 
0054 11/19/18 
0505 11/18/18 
0013 WBC 13.7* 14.3* 14.2* HGB 11.4* 12.7 13.2 HCT 35.2* 38.8 40.4  196 192 Recent Labs  
  11/20/18 
0054 11/19/18 
0505 11/18/18 
0013  138 140  
K 3.7 4.0 4.3  102 104 CO2 28 26 26 * 206* 155* BUN 67* 49* 38* CREA 2.07* 1.98* 1.72* CA 8.2* 8.7 8.3*  
MG 2.0 2.0 1.9 PHOS  --   --  3.4 No components found for: [place] No results for input(s): PH, PCO2, PO2, HCO3, FIO2 in the last 72 hours. No results for input(s): INR in the last 72 hours. No lab exists for component: INREXT, INREXT No results found for: SDES No results found for: CULT 
        
___________________________________________________ Attending Physician: Alyse Christian MD

## 2018-11-20 NOTE — NURSE NAVIGATOR
Met with patient at bedside and introduced self and role of HF nurse navigator. Assessed patient current understanding of medical condition. Patient states he had uncontrolled BP at home and came to the ED when his systolic reached 050. His symptoms were shortness of breath and swelling. He states he was on prednisone for his kidney disease and his BP medications had been changed when he developed cough with lisinopril. He states he is going to be going home with insulin due to high blood sugar which he understands could be made worse by the prednisone. Reviewed diastolic heart failure in setting of uncontrolled BP with kidney disease. Discussed symptoms of fluid retention and self monitoring at home with daily weight. Reviewed weight parameters and how to be accurate with daily weight. Reviewed role of low sodium diet to fluid retention and BP. Patient states he has never been asked to eat a special diet. Patient notes he never eats 3 meals a day, frequently eats only one, and has been know to skip a day and not eat anything at all. In addition, his normal pattern for bowel movements is once a week. His girlfriend arrived during our discussion and noted that he has considerable discomfort and takes up to 2 hours when he does have a bowel movement. Patient states he does not feel the above patterns are a problem for him. Discussed benefits of more regular meal patterns, more proactive bowel regimen, and recommendation of consistent carbohydrate, low sodium diet. Patient would benefit from nutrition consult. Discussed importance of medications. Patient admits he is not always compliant with medications. He states he does not liking taking too many different medications and he is hopeful he will be able to wean of prednisone and insulin. Discussed risk of uncontrolled BP and worsening kidney disease and chronic nature of HTN and kidney disease.   Discussed the motivation of creating healthier habits in conjunction with medications to improved quality of life. Patient notes significant stress with working-he is self employed, and maintaining his home. Anxiety and possibly depression could be playing a role. LIVING WITH HEART FAILURE EDUCATION BOOK given to patient for reference and review.

## 2018-11-20 NOTE — PROGRESS NOTES
Shift Summary 0700 Bedside and Verbal shift change report given to Alexandru Buckley RN (oncoming nurse) by Emma Kat (offgoing nurse). Report included the following information SBAR, Kardex, MAR, Recent Results and Cardiac Rhythm  . BP elevated. Morning medications given slightly early.  scheduled coverage given. Nicotine patient applied to R Deltoid BP has decreased to the 150's/60's Nitro paste removed. Imdur given. BP remains 150s/60s Patient drinking regular gingerale. Education provided regarding diabetic choices. [address] Bedside and Verbal shift change report given to Samantha Avilez RN (oncoming nurse) by Lavon Michel. Anh Fisher RN (offgoing nurse). Report included the following information SBAR, Kardex, MAR and Recent Results.

## 2018-11-20 NOTE — PROGRESS NOTES
Cardiology Progress Note [address] [facility]. [address], [place], [address] [facility] Phone [phone]; Fax [phone] 
 
 
 
2018 8:49 AM  
 
Admit Date:           2018 Admit Diagnosis:  Hypertensive emergency :          1955 MRN:          [id number] ASSESSMENT/RECOMMENDATION:  
1)Hypertensive Urgency   
- BP liable . - Suspect a lot of his problems (decompensated CHF, angina, elevated trop) could be due to uncontrolled HTN.  
- Work with Nephrology on the anti-hypertensive regimen given his renal disease 
 - continue norvasc/clonodine/imdur, bumex (dose decreased). Bystolic added yesterday (labatelol d/c). Instructed him to take his BP at home twice a day and keep log to bring in to Dr Antonia Casas. - Holding off on ACE/ARB d/w with nephrology. - Renal US unable to visualize arteries -d/t bowel gas   
2) CAD and chest pain; elevated troponin   
- he has known CAD with his RCA chronically occluded. - Will treat medically for CAD and control his BP first 
- consider cath if he continues to have anginal symptoms  
- Have restarted ASA and a BB. Cont statin. - Switch NP to Imdur  
- smoking cessation advised -nicotine patch  
  
3) Pulmonary plaquing d/t Asbestos - pulmonary following 
-will need PFTs OP 
-chronic steroid use 4) Moderate Carotid artery disease  
- statin, ASA, serial studies   
5) History of MR 
- loud murmur at apex (positional )/mild MR per echo--> follow on serial studies   
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 Creatinine trending up. Bumex dose decreased. -control BP 
-renal US inconclusive 7) Constipation: had a BM 
-miralax and colace ordered. Reviewed with patient. Follow up with Dr Antonia Casas on Tuesday. Okay to d/c from a cardiac standpoint CARDIOLOGY ATTENDING 
 Patient personally seen and examined. All the elements of history and examination were personally performed. Assessment and plan was discussed and agree as written above He is feeling better. Defer diuretics to Dr. Lata Egan. I discussed this with him and he is using it for edema given nephrotic syndrome. Have switched BB to Bystolic (given his wheezing issues and concern of bronchospasm with other BB). Will see him back in clinic soon. Plan to titrate up BP meds gradually. OK to DC home today. Renee Arenas MD, [facility]  
 
  
  
 
 
 
 
 
11/20 3551 - 11/20 1900 In: 26 [P.O.:576] Out: - Last 3 Recorded Weights in this Encounter 11/18/18 0532 11/19/18 7270 11/19/18 2357 Weight: 156 lb 8 oz (71 kg) 153 lb 3.5 oz (69.5 kg) 155 lb (70.3 kg)  
 
 
 
11/18 1901 - 11/20 0700 In: 520 [P.O.:520] Out: 1900 [[id number]] SUBJECTIVE [place] denies palpitations, irregular heart beat, SOB, chest pain or LE edema. Denies h/a No lightheadedness or dizziness Wants to go home. Had a BM Current Facility-Administered Medications Medication Dose Route Frequency  methylPREDNISolone (PF) (SOLU-MEDROL) injection 60 mg  60 mg IntraVENous Q8H  
 polyethylene glycol (MIRALAX) packet 17 g  17 g Oral DAILY  docusate sodium (COLACE) capsule 100 mg  100 mg Oral BID  insulin NPH (NOVOLIN N, HUMULIN N) injection 10 Units  10 Units SubCUTAneous ACB&D  
 bumetanide (BUMEX) tablet 1 mg  1 mg Oral DAILY  albuterol (ACCUNEB) nebulizer solution 1.25 mg  1.25 mg Nebulization Q4H RT  
 heparin (porcine) injection 5,000 Units  5,000 Units SubCUTAneous Q8H  
 cloNIDine HCl (CATAPRES) tablet 0.1 mg  0.1 mg Oral TID  labetalol (NORMODYNE) tablet 200 mg  200 mg Oral Q12H  hydrALAZINE (APRESOLINE) 20 mg/mL injection 20 mg  20 mg IntraVENous Q6H PRN  
 amLODIPine (NORVASC) tablet 10 mg  10 mg Oral DAILY  diphenhydrAMINE (BENADRYL) capsule 25 mg  25 mg Oral Q6H PRN  
  aspirin chewable tablet 81 mg  81 mg Oral DAILY  insulin lispro (HUMALOG) injection   SubCUTAneous AC&HS  
 glucose chewable tablet 16 g  4 Tab Oral PRN  
 dextrose (D50W) injection syrg 12.5-25 g  12.5-25 g IntraVENous PRN  
 glucagon (GLUCAGEN) injection 1 mg  1 mg IntraMUSCular PRN  
 nitroglycerin (NITROBID) 2 % ointment 1 Inch  1 Inch Topical BID  sodium chloride (NS) flush 5-10 mL  5-10 mL IntraVENous Q8H  
 sodium chloride (NS) flush 5-10 mL  5-10 mL IntraVENous PRN  
 acetaminophen (TYLENOL) tablet 650 mg  650 mg Oral Q4H PRN  
 oxyCODONE-acetaminophen (PERCOCET) 5-325 mg per tablet 1 Tab  1 Tab Oral Q4H PRN  
 HYDROmorphone (PF) (DILAUDID) injection 0.5 mg  0.5 mg IntraVENous Q4H PRN  prochlorperazine (COMPAZINE) injection 10 mg  10 mg IntraVENous Q6H PRN  
 zolpidem (AMBIEN) tablet 5 mg  5 mg Oral QHS PRN  
 albuterol (PROVENTIL VENTOLIN) nebulizer solution 2.5 mg  2.5 mg Nebulization Q2H PRN  
 nicotine (NICODERM CQ) 21 mg/24 hr patch 1 Patch  1 Patch TransDERmal Q24H  
 guaiFENesin-codeine (ROBITUSSIN AC) 100-10 mg/5 mL solution 5 mL  5 mL Oral Q4H PRN  
 atorvastatin (LIPITOR) tablet 80 mg  80 mg Oral QHS OBJECTIVE Intake/Output Summary (Last 24 hours) at 11/20/2018 1035 Last data filed at 11/20/2018 1933 Gross per 24 hour Intake 1036 ml Output 1900 ml Net -864 ml Review of Systems - History obtained from the patient AS PER  HPI Telemetry NSR- SB 
 
PHYSICAL EXAM  
  
 
Visit Vitals /60 Pulse 67 Temp 97.6 °F (36.4 °C) Resp 21 Ht 5' 9\" (1.753 m) Wt 155 lb (70.3 kg) SpO2 93% BMI 22.89 kg/m² Gen: Well-developed, obese, in no acute distress  alert and oriented x 3 HEENT:  Pink conjunctivae, Hearing grossly normal.No scleral icterus or conjunctival, moist mucous membranes Neck: Supple,No JVD Resp: No accessory muscle use, Clear breath sounds, faint exp wheezing Card: Regular Rate,Rythm, no murmur, no rubs or gallop. No thrills. GI:          Rounded and firm, non-tender MSK: No cyanosis or clubbing, good capillary refill Skin: No rashes or ulcers, no bruising Neuro:  Cranial nerves are grossly intact, moving all four extremities, no focal deficit, follows commands appropriately Psych:  Good insight, oriented to person, place and time, alert, Nml Affect LE: No edema DATA REVIEW Laboratory and Imaging have been reviewed by me and are notable for Recent Labs 11/18/18 
0013 11/17/18 
1700 CPK 45 41 CKMB 3.5 4.2*  
TROIQ 0.07* 0.10* Recent Labs  
  11/20/18 
0054 11/19/18 
0505 11/18/18 
0013  138 140  
K 3.7 4.0 4.3 CO2 28 26 26 BUN 67* 49* 38* CREA 2.07* 1.98* 1.72* * 206* 155* PHOS  --   --  3.4 MG 2.0 2.0 1.9 WBC 13.7* 14.3* 14.2* HGB 11.4* 12.7 13.2 HCT 35.2* 38.8 40.4  196 192 Josy Aceves NP

## 2018-11-20 NOTE — PROGRESS NOTES
Bedside and Verbal shift change report given to RN Jammie Woods (oncoming nurse) by Glendy Yeboah  (offgoing nurse). Report included the following information SBAR, Kardex, MAR, Accordion, Recent Results, Med Rec Status and Cardiac Rhythm nsr. Mr. Riya Hollingsworth was frequently rounded on per protocol. No concerns or issue Bedside and Verbal shift change report given to RN Isabel Cooney (oncoming nurse) by Kyung Fuentes (offgoing nurse). Report included the following information SBAR, Kardex, Accordion, Recent Results, Med Rec Status and Cardiac Rhythm nsr.

## 2018-11-20 NOTE — DIABETES MGMT
DTC Progress Note Recommendations/ Comments: If appropriate, please consider increasing NPH to 14 units 2 times daily before meals. Pt has required 13 units of correction over the past 24 hours. Current hospital DM medication: 
-Humalog normal sensitivity correction  
-NPH 10 units bid before meals 
-Solumedrol 60mg every 8 hours Chart reviewed on [facility]. Patient is a 58 y.o. male with no reported history of diabetes, but most recent A1c 6.8% - indicative of new dx. A1c:  
Lab Results Component Value Date/Time Hemoglobin A1c 6.8 (H) 11/18/2018 12:13 AM  
 Hemoglobin A1c 5.9 02/15/2017 05:50 AM  
 
 
Recent Glucose Results:  
Lab Results Component Value Date/Time  (H) 11/20/2018 12:54 AM  
 GLUCPOC 214 (H) 11/20/2018 06:30 AM  
 GLUCPOC 247 (H) 11/19/2018 08:56 PM  
 GLUCPOC 314 (H) 11/19/2018 04:07 PM  
  
 
Lab Results Component Value Date/Time Creatinine 2.07 (H) 11/20/2018 12:54 AM  
 
Estimated Creatinine Clearance: 35.8 mL/min (A) (based on SCr of 2.07 mg/dL (H)). Active Orders Diet DIET CARDIAC Regular; 2 GM NA (House Low NA) PO intake:  
Patient Vitals for the past 72 hrs: 
 % Diet Eaten 11/20/18 0844 100 % 11/19/18 1308 100 % 11/18/18 1738 100 % 11/18/18 1333 100 % 11/18/18 0800 100 % 11/17/18 1802 100 % 11/17/18 1216 75 % Will continue to follow as needed. Thank you Shai Sneed RD, CDE [facility] Office: [phone] Pager: [phone]

## 2018-11-21 VITALS
RESPIRATION RATE: 18 BRPM | SYSTOLIC BLOOD PRESSURE: 172 MMHG | WEIGHT: 156 LBS | DIASTOLIC BLOOD PRESSURE: 75 MMHG | TEMPERATURE: 98 F | OXYGEN SATURATION: 92 % | BODY MASS INDEX: 23.11 KG/M2 | HEIGHT: 69 IN | HEART RATE: 57 BPM

## 2018-11-21 PROBLEM — R60.9 EDEMA: Status: RESOLVED | Noted: 2018-11-17 | Resolved: 2018-11-21

## 2018-11-21 PROBLEM — E11.9 DM (DIABETES MELLITUS), TYPE 2 (HCC): Status: ACTIVE | Noted: 2018-11-21

## 2018-11-21 PROBLEM — K59.00 CONSTIPATION: Chronic | Status: ACTIVE | Noted: 2018-11-21

## 2018-11-21 PROBLEM — I50.30 (HFPEF) HEART FAILURE WITH PRESERVED EJECTION FRACTION (HCC): Status: ACTIVE | Noted: 2018-11-21

## 2018-11-21 PROBLEM — R73.9 HYPERGLYCEMIA: Status: RESOLVED | Noted: 2018-11-17 | Resolved: 2018-11-21

## 2018-11-21 PROBLEM — I16.0 HYPERTENSIVE URGENCY: Status: RESOLVED | Noted: 2018-11-17 | Resolved: 2018-11-21

## 2018-11-21 LAB
ANION GAP SERPL CALC-SCNC: 9 MMOL/L (ref 5–15)
BUN SERPL-MCNC: 71 MG/DL (ref 6–20)
BUN/CREAT SERPL: 32 (ref 12–20)
CALCIUM SERPL-MCNC: 8 MG/DL (ref 8.5–10.1)
CHLORIDE SERPL-SCNC: 106 MMOL/L (ref 97–108)
CO2 SERPL-SCNC: 28 MMOL/L (ref 21–32)
CREAT SERPL-MCNC: 2.19 MG/DL (ref 0.7–1.3)
ERYTHROCYTE [DISTWIDTH] IN BLOOD BY AUTOMATED COUNT: 15.2 % (ref 11.5–14.5)
GLUCOSE BLD STRIP.AUTO-MCNC: 165 MG/DL (ref 65–100)
GLUCOSE SERPL-MCNC: 148 MG/DL (ref 65–100)
HCT VFR BLD AUTO: 34.6 % (ref 36.6–50.3)
HGB BLD-MCNC: 11.3 G/DL (ref 12.1–17)
MAGNESIUM SERPL-MCNC: 2.1 MG/DL (ref 1.6–2.4)
MCH RBC QN AUTO: 29.9 PG (ref 26–34)
MCHC RBC AUTO-ENTMCNC: 32.7 G/DL (ref 30–36.5)
MCV RBC AUTO: 91.5 FL (ref 80–99)
METANEPH FREE SERPL-MCNC: 59 PG/ML (ref 0–62)
NORMETANEPHRINE SERPL-MCNC: 130 PG/ML (ref 0–145)
NRBC # BLD: 0 K/UL (ref 0–0.01)
NRBC BLD-RTO: 0 PER 100 WBC
PLATELET # BLD AUTO: 198 K/UL (ref 150–400)
PMV BLD AUTO: 10.7 FL (ref 8.9–12.9)
POTASSIUM SERPL-SCNC: 3.5 MMOL/L (ref 3.5–5.1)
RBC # BLD AUTO: 3.78 M/UL (ref 4.1–5.7)
SERVICE CMNT-IMP: ABNORMAL
SODIUM SERPL-SCNC: 143 MMOL/L (ref 136–145)
WBC # BLD AUTO: 15.4 K/UL (ref 4.1–11.1)

## 2018-11-21 PROCEDURE — 82962 GLUCOSE BLOOD TEST: CPT

## 2018-11-21 PROCEDURE — 85027 COMPLETE CBC AUTOMATED: CPT

## 2018-11-21 PROCEDURE — 83735 ASSAY OF MAGNESIUM: CPT

## 2018-11-21 PROCEDURE — 74011000250 HC RX REV CODE- 250: Performed by: INTERNAL MEDICINE

## 2018-11-21 PROCEDURE — 74011636637 HC RX REV CODE- 636/637: Performed by: INTERNAL MEDICINE

## 2018-11-21 PROCEDURE — 74011250637 HC RX REV CODE- 250/637: Performed by: INTERNAL MEDICINE

## 2018-11-21 PROCEDURE — 74011250636 HC RX REV CODE- 250/636: Performed by: INTERNAL MEDICINE

## 2018-11-21 PROCEDURE — 74011250637 HC RX REV CODE- 250/637: Performed by: SPECIALIST

## 2018-11-21 PROCEDURE — 74011250636 HC RX REV CODE- 250/636: Performed by: PHYSICIAN ASSISTANT

## 2018-11-21 PROCEDURE — 80048 BASIC METABOLIC PNL TOTAL CA: CPT

## 2018-11-21 PROCEDURE — 94640 AIRWAY INHALATION TREATMENT: CPT

## 2018-11-21 PROCEDURE — 36415 COLL VENOUS BLD VENIPUNCTURE: CPT

## 2018-11-21 PROCEDURE — 74011000250 HC RX REV CODE- 250: Performed by: PHYSICIAN ASSISTANT

## 2018-11-21 PROCEDURE — 74011250637 HC RX REV CODE- 250/637: Performed by: NURSE PRACTITIONER

## 2018-11-21 RX ORDER — PREDNISONE 10 MG/1
TABLET ORAL
Qty: 60 TAB | Refills: 0 | Status: SHIPPED | OUTPATIENT
Start: 2018-11-21

## 2018-11-21 RX ORDER — CLONIDINE HYDROCHLORIDE 0.1 MG/1
0.1 TABLET ORAL EVERY 12 HOURS
Status: DISCONTINUED | OUTPATIENT
Start: 2018-11-21 | End: 2018-11-21 | Stop reason: HOSPADM

## 2018-11-21 RX ORDER — NEBIVOLOL 5 MG/1
5 TABLET ORAL DAILY
Qty: 30 TAB | Refills: 1 | Status: SHIPPED | OUTPATIENT
Start: 2018-11-21 | End: 2018-11-27 | Stop reason: SDUPTHER

## 2018-11-21 RX ORDER — CLONIDINE HYDROCHLORIDE 0.1 MG/1
0.1 TABLET ORAL EVERY 12 HOURS
Qty: 60 TAB | Refills: 1 | Status: SHIPPED | OUTPATIENT
Start: 2018-11-21

## 2018-11-21 RX ORDER — ISOSORBIDE MONONITRATE 60 MG/1
60 TABLET, EXTENDED RELEASE ORAL DAILY
Qty: 30 TAB | Refills: 1 | Status: SHIPPED | OUTPATIENT
Start: 2018-11-22

## 2018-11-21 RX ORDER — BUMETANIDE 1 MG/1
0.5 TABLET ORAL DAILY
Status: DISCONTINUED | OUTPATIENT
Start: 2018-11-22 | End: 2018-11-21 | Stop reason: HOSPADM

## 2018-11-21 RX ORDER — ALBUTEROL SULFATE 1.25 MG/3ML
1.25 SOLUTION RESPIRATORY (INHALATION)
Status: DISCONTINUED | OUTPATIENT
Start: 2018-11-21 | End: 2018-11-21 | Stop reason: HOSPADM

## 2018-11-21 RX ORDER — INSULIN PUMP SYRINGE, 3 ML
EACH MISCELLANEOUS
Qty: 1 KIT | Refills: 0 | Status: SHIPPED | OUTPATIENT
Start: 2018-11-21

## 2018-11-21 RX ORDER — BUMETANIDE 0.5 MG/1
0.5 TABLET ORAL DAILY
Qty: 30 TAB | Refills: 1 | Status: SHIPPED | OUTPATIENT
Start: 2018-11-22 | End: 2018-11-27

## 2018-11-21 RX ORDER — POLYETHYLENE GLYCOL 3350 17 G/17G
17 POWDER, FOR SOLUTION ORAL 2 TIMES DAILY
Status: DISCONTINUED | OUTPATIENT
Start: 2018-11-21 | End: 2018-11-21 | Stop reason: HOSPADM

## 2018-11-21 RX ORDER — GUAIFENESIN 100 MG/5ML
81 LIQUID (ML) ORAL DAILY
Qty: 1 TAB | Refills: 1 | Status: SHIPPED | OUTPATIENT
Start: 2018-11-22 | End: 2018-12-11 | Stop reason: ALTCHOICE

## 2018-11-21 RX ORDER — NEBIVOLOL 5 MG/1
5 TABLET ORAL EVERY EVENING
Status: DISCONTINUED | OUTPATIENT
Start: 2018-11-21 | End: 2018-11-21 | Stop reason: HOSPADM

## 2018-11-21 RX ORDER — BUDESONIDE AND FORMOTEROL FUMARATE DIHYDRATE 80; 4.5 UG/1; UG/1
2 AEROSOL RESPIRATORY (INHALATION) 2 TIMES DAILY
Qty: 1 INHALER | Refills: 0 | Status: SHIPPED | OUTPATIENT
Start: 2018-11-21

## 2018-11-21 RX ORDER — NEBIVOLOL 5 MG/1
5 TABLET ORAL DAILY
Qty: 28 TAB | Refills: 0 | COMMUNITY
Start: 2018-11-21 | End: 2018-11-21

## 2018-11-21 RX ADMIN — POLYETHYLENE GLYCOL 3350 17 G: 17 POWDER, FOR SOLUTION ORAL at 09:04

## 2018-11-21 RX ADMIN — Medication 10 ML: at 06:49

## 2018-11-21 RX ADMIN — ASPIRIN 81 MG 81 MG: 81 TABLET ORAL at 09:04

## 2018-11-21 RX ADMIN — BUDESONIDE 500 MCG: 0.5 INHALANT RESPIRATORY (INHALATION) at 07:16

## 2018-11-21 RX ADMIN — DOCUSATE SODIUM 100 MG: 100 CAPSULE, LIQUID FILLED ORAL at 09:04

## 2018-11-21 RX ADMIN — GUAIFENESIN AND CODEINE PHOSPHATE 5 ML: 100; 10 SOLUTION ORAL at 10:04

## 2018-11-21 RX ADMIN — ISOSORBIDE MONONITRATE 60 MG: 30 TABLET, EXTENDED RELEASE ORAL at 09:04

## 2018-11-21 RX ADMIN — INSULIN HUMAN 20 UNITS: 100 INJECTION, SUSPENSION SUBCUTANEOUS at 09:03

## 2018-11-21 RX ADMIN — HEPARIN SODIUM 5000 UNITS: 5000 INJECTION INTRAVENOUS; SUBCUTANEOUS at 09:08

## 2018-11-21 RX ADMIN — INSULIN LISPRO 3 UNITS: 100 INJECTION, SOLUTION INTRAVENOUS; SUBCUTANEOUS at 09:03

## 2018-11-21 RX ADMIN — METHYLPREDNISOLONE SODIUM SUCCINATE 60 MG: 125 INJECTION, POWDER, FOR SOLUTION INTRAMUSCULAR; INTRAVENOUS at 06:49

## 2018-11-21 RX ADMIN — ALBUTEROL SULFATE 1.25 MG: 1.25 SOLUTION RESPIRATORY (INHALATION) at 07:16

## 2018-11-21 RX ADMIN — ALBUTEROL SULFATE 1.25 MG: 1.25 SOLUTION RESPIRATORY (INHALATION) at 00:16

## 2018-11-21 RX ADMIN — HEPARIN SODIUM 5000 UNITS: 5000 INJECTION INTRAVENOUS; SUBCUTANEOUS at 02:26

## 2018-11-21 RX ADMIN — AMLODIPINE BESYLATE 10 MG: 5 TABLET ORAL at 09:05

## 2018-11-21 RX ADMIN — CLONIDINE HYDROCHLORIDE 0.1 MG: 0.1 TABLET ORAL at 09:04

## 2018-11-21 NOTE — PROGRESS NOTES
Name: [facility]: [address]  
: 1955 Admit Date: 2018 Phone: [phone]  Room: [facility]/01 PCP: Ingrid Yu MD  MRN: [id number] Date: 2018  Code: Full Code Chart and notes reviewed. Data reviewed. I review the patient's current medications in the medical record at each encounter. I have evaluated and examined the patient. Overnight events Afebrile HR 50-60s Sats 92% on RA 
BP remains elevated, but overall improved from admission WBC 15.4 Creat 2.19- worse Renal duplex was poor study due to bowel gas and unable to visualize. RVP negative ROS: Feels better overall. Breathing improved. Still with cough, non-productive. No fevers/chills. Doesn't think that the nebs help and that they make him cough more. States he is going home today \" no matter what. \" 
 
 
Past Medical History:  
Diagnosis Date  (HFpEF) heart failure with preserved ejection fraction ([address] 2018  Acute CVA (cerebrovascular accident) ([address] 2/15/2017 Per clinical exam  
 CAD (coronary artery disease) MI ; stenting x 2 in   Carotid artery disease ([address]  Cough  Diplopia 2017  DM (diabetes mellitus), type 2 ([address] 2018  Fatigue  FSGS (focal segmental glomerulosclerosis)  Headache  History of CVA (cerebrovascular accident) 2017 CT head: chronic L BG infarct  Hyperlipidemia  Hypertension  Mitral regurgitation Mod-severe MR in   Muscle weakness  PVD (peripheral vascular disease) ([address] Past Surgical History:  
Procedure Laterality Date  CARDIAC SURG PROCEDURE UNLIST 4 cardiac stents, 3 stens to L leg, 2 stens R leg, 1 stent to abdomen  HX MOHS PROCEDURES Right 2015  HX ORTHOPAEDIC Left 2015  
 rotator cuff manipulation  NEUROLOGICAL PROCEDURE UNLISTED    
 EMG Family History Problem Relation Age of Onset  Heart Disease Brother Social History Tobacco Use  Smoking status: Current Every Day Smoker Packs/day: 0.25 Types: Cigarettes Last attempt to quit: 2016 Years since quittin.7  Smokeless tobacco: Current User Substance Use Topics  Alcohol use: Yes Alcohol/week: 12.0 oz Types: 24 Cans of beer per week Comment: socially Allergies Allergen Reactions  Ace Inhibitors Cough Current Facility-Administered Medications Medication Dose Route Frequency  cloNIDine HCl (CATAPRES) tablet 0.1 mg  0.1 mg Oral Q12H  polyethylene glycol (MIRALAX) packet 17 g  17 g Oral BID  methylPREDNISolone (PF) (SOLU-MEDROL) injection 60 mg  60 mg IntraVENous Q8H  
 docusate sodium (COLACE) capsule 100 mg  100 mg Oral BID  insulin NPH (NOVOLIN N, HUMULIN N) injection 20 Units  20 Units SubCUTAneous ACB&D  
 isosorbide mononitrate ER (IMDUR) tablet 60 mg  60 mg Oral DAILY  insulin lispro (HUMALOG) injection   SubCUTAneous AC&HS  budesonide (PULMICORT) 500 mcg/2 ml nebulizer suspension  500 mcg Nebulization BID RT  
 nebivolol (BYSTOLIC) tablet 10 mg  10 mg Oral QPM  
 bumetanide (BUMEX) tablet 1 mg  1 mg Oral DAILY  albuterol (ACCUNEB) nebulizer solution 1.25 mg  1.25 mg Nebulization Q4H RT  
 heparin (porcine) injection 5,000 Units  5,000 Units SubCUTAneous Q8H  
 hydrALAZINE (APRESOLINE) 20 mg/mL injection 20 mg  20 mg IntraVENous Q6H PRN  
 amLODIPine (NORVASC) tablet 10 mg  10 mg Oral DAILY  diphenhydrAMINE (BENADRYL) capsule 25 mg  25 mg Oral Q6H PRN  
 aspirin chewable tablet 81 mg  81 mg Oral DAILY  glucose chewable tablet 16 g  4 Tab Oral PRN  
 dextrose (D50W) injection syrg 12.5-25 g  12.5-25 g IntraVENous PRN  
 glucagon (GLUCAGEN) injection 1 mg  1 mg IntraMUSCular PRN  
 sodium chloride (NS) flush 5-10 mL  5-10 mL IntraVENous Q8H  
 sodium chloride (NS) flush 5-10 mL  5-10 mL IntraVENous PRN  
  acetaminophen (TYLENOL) tablet 650 mg  650 mg Oral Q4H PRN  
 oxyCODONE-acetaminophen (PERCOCET) 5-325 mg per tablet 1 Tab  1 Tab Oral Q4H PRN  
 HYDROmorphone (PF) (DILAUDID) injection 0.5 mg  0.5 mg IntraVENous Q4H PRN  prochlorperazine (COMPAZINE) injection 10 mg  10 mg IntraVENous Q6H PRN  
 zolpidem (AMBIEN) tablet 5 mg  5 mg Oral QHS PRN  
 albuterol (PROVENTIL VENTOLIN) nebulizer solution 2.5 mg  2.5 mg Nebulization Q2H PRN  
 nicotine (NICODERM CQ) 21 mg/24 hr patch 1 Patch  1 Patch TransDERmal Q24H  
 guaiFENesin-codeine (ROBITUSSIN AC) 100-10 mg/5 mL solution 5 mL  5 mL Oral Q4H PRN  
 atorvastatin (LIPITOR) tablet 80 mg  80 mg Oral QHS REVIEW OF SYSTEMS  
12 point ROS negative except as stated in the HPI. Physical Exam:  
Visit Vitals /75 (BP 1 Location: Right arm, BP Patient Position: At rest) Pulse (!) 57 Temp 98 °F (36.7 °C) Resp 18 Ht 5' 9\" (1.753 m) Wt 70.8 kg (156 lb) SpO2 92% BMI 23.04 kg/m² General:  Alert, cooperative, no distress, appears stated age. Head:  Normocephalic, without obvious abnormality, atraumatic. Eyes:  Conjunctivae/corneas clear. Nose: Nares normal. Septum midline. Mucosa normal.   
Throat: Lips, mucosa, and tongue normal.   
Neck: Supple, symmetrical, trachea midline, no adenopathy. Lungs:   Diminished with mild exp wheeze throughout Chest wall:  No tenderness or deformity. Heart:  Regular rate and rhythm, S1, S2 normal, 2/6 murmur at apex, no click, rub or gallop. Abdomen:   Soft, non-tender. Bowel sounds normal. No masses,  No organomegaly. Extremities: Extremities normal, atraumatic, no cyanosis. Trace LE edema Pulses: 2+ and symmetric all extremities. Skin: Skin color, texture, turgor normal. No rashes or lesions Lymph nodes: Cervical, supraclavicular nodes normal.  
Neurologic: Grossly nonfocal  
 
 
Lab Results Component Value Date/Time  Sodium 143 11/21/2018 01:18 AM  
 Potassium 3.5 11/21/2018 01:18 AM  
 Chloride 106 11/21/2018 01:18 AM  
 CO2 28 11/21/2018 01:18 AM  
 BUN 71 (H) 11/21/2018 01:18 AM  
 Creatinine 2.19 (H) 11/21/2018 01:18 AM  
 Glucose 148 (H) 11/21/2018 01:18 AM  
 Calcium 8.0 (L) 11/21/2018 01:18 AM  
 Magnesium 2.1 11/21/2018 01:18 AM  
 Phosphorus 3.4 11/18/2018 12:13 AM  
 
 
Lab Results Component Value Date/Time WBC 15.4 (H) 11/21/2018 01:18 AM  
 HGB 11.3 (L) 11/21/2018 01:18 AM  
 PLATELET 645 42/64/9207 01:18 AM  
 MCV 91.5 11/21/2018 01:18 AM  
 
 
Lab Results Component Value Date/Time AST (SGOT) 18 11/17/2018 02:43 AM  
 Alk. phosphatase 115 11/17/2018 02:43 AM  
 Protein, total 6.0 (L) 11/17/2018 02:43 AM  
 Albumin 2.8 (L) 11/17/2018 02:43 AM  
 Globulin 3.2 11/17/2018 02:43 AM  
 
 
No results found for: IRON, FE, TIBC, IBCT, PSAT, FERR No results found for: SR, CRP, SP, ANAIGG, RA, RPR, RPRT, VDRLT, VDRLS, TSH, TSHEXT, TSHEXT No results found for: PH, PHI, PCO2, PCO2I, PO2, PO2I, HCO3, HCO3I, FIO2, FIO2I Lab Results Component Value Date/Time CK 45 11/18/2018 12:13 AM  
 CK-MB Index 7.8 (H) 11/18/2018 12:13 AM  
 Troponin-I, Qt. 0.07 (H) 11/18/2018 12:13 AM  
  
 
No results found for: CULT No results found for: TOXA1, RPR, HBCM, HBSAG, HAAB, HCAB1, HAAT, G6PD, CRYAC, HIVGT, HIVR, HIV1, HIV12, HIVPC, HIVRPI Lab Results Component Value Date/Time CK 45 11/18/2018 12:13 AM  
 CK 41 11/17/2018 05:00 PM  
 
 
Lab Results Component Value Date/Time  Color YELLOW/STRAW 11/17/2018 11:20 AM  
 Appearance CLEAR 11/17/2018 11:20 AM  
 pH (UA) 6.0 11/17/2018 11:20 AM  
 Protein >300 (A) 11/17/2018 11:20 AM  
 Glucose 250 (A) 11/17/2018 11:20 AM  
 Ketone NEGATIVE  11/17/2018 11:20 AM  
 Bilirubin NEGATIVE  11/17/2018 11:20 AM  
 Blood NEGATIVE  11/17/2018 11:20 AM  
 Urobilinogen 1.0 11/17/2018 11:20 AM  
 Nitrites NEGATIVE  11/17/2018 11:20 AM  
 Leukocyte Esterase NEGATIVE  11/17/2018 11:20 AM  
 WBC 0-4 11/17/2018 11:20 AM  
 RBC 0-5 11/17/2018 11:20 AM  
 Bacteria NEGATIVE  11/17/2018 11:20 AM  
 
 
Images: no new images this morning IMPRESSION 
· Hypertensive urgency · Dyspnea/wheezing with likely underlying COPD · CAD · CKD/proteinuria ; worsening · Hx of asbestosis: chronic pleural plaque on chest imaging · Tobacco use PLAN 
· On RA. O2 if needed to keep sats > 90%. 6MW prior to discharge · BP control per primary team/Renal/Cards: on Labetalol, Norvasc, clonidine, prn hydralazine. · Diuresis with Bumex per Cards/Renal; watch creat · Continue albuterol and wean IV Solumedrol to 40mg q8h: if he does indeed leave today then can switch to prednisone, although I don't think he is ready for discharge yet. Switch nebs to q6h. · Add on Pulmicort nebs · Potential RHC per cardiology · Patient needs outpatient pulmonary follow up and PFTs · Cardiac diet · DVT prophylaxis: sub q heparin · Complete smoking cessation. Nicotine patch in sonia Matos NP

## 2018-11-21 NOTE — TELEPHONE ENCOUNTER
Bystolic 5mg daily placed in closet for pt per VO of Dr. Lupe Donnelly. Coupon attached to bag for free 30-90 days as well.

## 2018-11-21 NOTE — DISCHARGE SUMMARY
Physician Discharge Summary     Patient ID:  Raghav Armendariz  [id number]  17 y.o.  1955    Admit date: 11/17/2018    Discharge date: 11/21/2018    Admission Diagnoses: Hypertensive emergency    Discharge Diagnoses:  Principal Diagnosis Hypertension                                            Principal Problem:    Hypertension ()    Active Problems:    CAD (coronary artery disease) ()      Overview: MI 1995      Elevated serum creatinine (11/17/2018)      (HFpEF) heart failure with preserved ejection fraction ([facility] (11/21/2018)      DM (diabetes mellitus), type 2 ([facility] (11/21/2018)      Constipation (11/21/2018)         Resolved Problems:  Problem List as of 11/21/2018 Date Reviewed: 11/17/2018          Codes Class Noted - Resolved    (HFpEF) heart failure with preserved ejection fraction ([facility] ICD-10-CM: I50.30  ICD-9-CM: 428.9  11/21/2018 - Present        DM (diabetes mellitus), type 2 ([facility] ICD-10-CM: E11.9  ICD-9-CM: 250.00  11/21/2018 - Present        Constipation (Chronic) ICD-10-CM: K59.00  ICD-9-CM: 564.00  11/21/2018 - Present        Elevated serum creatinine ICD-10-CM: R79.89  ICD-9-CM: 790.99  11/17/2018 - Present        History of CVA (cerebrovascular accident) (Chronic) ICD-10-CM: Z86.73  ICD-9-CM: V12.54  2/14/2017 - Present    Overview Signed 2/14/2017 11:04 AM by Rachel King     CT head: chronic L BG infarct             PVD (peripheral vascular disease) ([facility] ICD-10-CM: I73.9  ICD-9-CM: 443.9  Unknown - Present        Muscle weakness ICD-10-CM: M62.81  ICD-9-CM: 728.87  Unknown - Present        * (Principal) Hypertension (Chronic) ICD-10-CM: I10  ICD-9-CM: 401.9  Unknown - Present        Hyperlipidemia LDL goal <70 (Chronic) ICD-10-CM: E78.5  ICD-9-CM: 272.4  Unknown - Present        Headache ICD-10-CM: R51  ICD-9-CM: 784.0  Unknown - Present        Carotid artery disease (HCC) ICD-10-CM: I77.9  ICD-9-CM: 447.9  Unknown - Present        CAD (coronary artery disease) (Chronic) ICD-10-CM: I25.10  ICD-9-CM: 414.00  Unknown - Present    Overview Signed 2/14/2017 11:26 AM by [place]             Fatigue ICD-10-CM: R53.83  ICD-9-CM: 780.79  Unknown - Present        Cough ICD-10-CM: R05  ICD-9-CM: 786.2  Unknown - Present        Diplopia ICD-10-CM: H53.2  ICD-9-CM: 368.2  2/14/2017 - Present        Vomiting ICD-10-CM: R11.10  ICD-9-CM: 787.03  2/14/2017 - Present    Overview Signed 2/14/2017 11:29 AM by Eda Leone     PTA, occurred this AM             Accelerated hypertension (Chronic) ICD-10-CM: I10  ICD-9-CM: 401.0  2/14/2017 - Present        Disturbance of skin sensation ICD-10-CM: R20.9  ICD-9-CM: 782.0  4/10/2015 - Present        Carotid stenosis ICD-10-CM: I65.29  ICD-9-CM: 433.10  4/10/2015 - Present        RESOLVED: Hyperglycemia ICD-10-CM: R73.9  ICD-9-CM: 790.29  11/17/2018 - 11/21/2018        RESOLVED: Hypertensive urgency ICD-10-CM: I16.0  ICD-9-CM: 401.9  11/17/2018 - 11/21/2018        RESOLVED: Edema ICD-10-CM: R60.9  ICD-9-CM: 782.3  11/17/2018 - 11/21/2018        RESOLVED: Acute CVA (cerebrovascular accident) [facility]) ICD-10-CM: I63.9  ICD-9-CM: 434.91  2/15/2017 - 11/17/2018    Overview Signed 2/15/2017  8:45 AM by Eda Leone     Per clinical exam of diplopia and nystagmus                     Hospital Course:   Mr. Krystle Goodson was admitted to the Hospitalist Service on the 3rd floor for treatment of uncontrolled HTN. His BP regimen was adjusted and his BP improved. He was evaluated by Cardiology for chest pain and elevated troponin, this was attributed to his uncontrolled HTN and was treated medically. Renal saw him for his CKD/FSGS and recommended continuing his steroids and controlling his BP. His creatinine remained above his baseline and trended up throughout his hospitalization, however, Renal cleared him for discharge, mainly because he was adamant about leaving.   Vascular saw him for carotid disease, which was asymptomatic, and recommended medical treatment with antiplatelets and statins. Pulmonary saw him and treated him for acute COPD and recommended he stay in the hospital but he would not. He was diagnosed with borderline DM with an A1c of 6.8; he attributes this to his steroids for his FSGS and refused to be discharged on insulin as started in hospital by Dr. Isabella Garcia. He did agree to obtain a glucometer and check his BS at home and follow up with his PCP. He was discharged home on 11/21/2018 in improved condition. PCP: Kitty Baires MD    Consults: Cardiology, Pulmonary/Intensive care, Nephrology and Vascular Surgery    Discharge Exam:  See my Progress Note from today. Disposition: home    Patient Instructions:   Current Discharge Medication List      START taking these medications    Details   bumetanide (BUMEX) 0.5 mg tablet Take 1 Tab by mouth daily. Qty: 30 Tab, Refills: 1      cloNIDine HCl (CATAPRES) 0.1 mg tablet Take 1 Tab by mouth every twelve (12) hours. Qty: 60 Tab, Refills: 1      isosorbide mononitrate ER (IMDUR) 60 mg CR tablet Take 1 Tab by mouth daily. Qty: 30 Tab, Refills: 1      aspirin 81 mg chewable tablet Take 1 Tab by mouth daily. Qty: 1 Tab, Refills: 1      Blood-Glucose Meter monitoring kit Check blood sugar twice a day  Qty: 1 Kit, Refills: 0      budesonide-formoterol (SYMBICORT) 80-4.5 mcg/actuation HFAA Take 2 Puffs by inhalation two (2) times a day. Qty: 1 Inhaler, Refills: 0      ipratropium-albuterol (COMBIVENT RESPIMAT)  mcg/actuation inhaler Take 1 Puff by inhalation every six (6) hours. Qty: 1 Inhaler, Refills: 0         CONTINUE these medications which have CHANGED    Details   nebivolol (BYSTOLIC) 5 mg tablet Take 1 Tab by mouth daily.   Qty: 30 Tab, Refills: 1      predniSONE (DELTASONE) 10 mg tablet 40 mg TWICE a day for 3 days, then 40 mg once a day and wean further after follow up with Dr. Marin Book: 60 Tab, Refills: 0         CONTINUE these medications which have NOT CHANGED Details   atorvastatin (LIPITOR) 80 mg tablet Take 80 mg by mouth daily. amLODIPine (NORVASC) 10 mg tablet Take 1 Tab by mouth daily. Qty: 30 Tab, Refills: 1            Activity: Activity as tolerated  Diet: Diabetic Diet  Wound Care: None needed    Follow-up Information     Follow up With Specialties Details Why Contact Info    Jagdish Rey MD Cardiology On 11/27/2018 2 pm [address] [facility]  [phone]      Silvano Dawson MD Family Practice Go on 12/3/2018 Hospital follow-up scheduled at 2:00pm ( If you have questions or need to reschedule please call [phone] [facility] [phone]            35 minutes were spent on this discharge.     Signed:  Honorio Hamlin MD  11/21/2018  12:50 PM

## 2018-11-21 NOTE — PROGRESS NOTES
Beginning of shift:  Bedside and Verbal shift change report given to William Park RN (oncoming nurse) by David Rubio RN (offgoing nurse). Report included the following information SBAR, Kardex, Procedure Summary, Intake/Output, Recent Results, Med Rec Status and Cardiac Rhythm NSR. 
 
2033  Initial assessment completed. Introduced self as primary RN.  Pt appeared to have been asleep when first entering the room, but was easily awakened once he heard noise. VSS. Evening meds given PO w/o complications.  Pt denies additional complaints at this time. Plan for the evening discussed with pt and verbalized understanding. Bed locked and in low position with call bell within reach.  Instructed him to press call owens when needed. White board updated with this RN's name. 2121  2 units of Humalog given SQ for POC BG of 241. 
 
0421  Pt unable to sleep, but states he has no needs at this time. Bedside and Verbal shift change report given to Konrad Mills RN (oncoming nurse) by William Park RN (offgoing nurse). Report included the following information SBAR, Kardex, Procedure Summary, Intake/Output, MAR, Recent Results, Med Rec Status and Cardiac Rhythm NSR - Sinus Anthony Naas.

## 2018-11-21 NOTE — PROGRESS NOTES
Email sent to Felipe Palacios specialist to assist with scheduling of PCP hospital follow-up appointment.

## 2018-11-21 NOTE — PROGRESS NOTES
PCP UVALDO appt scheduled with Dr. Zuleima Whitehead on 12/3/2018 at 2:00pm Appt added to AVS. Teri Montanez CM Specialist

## 2018-11-21 NOTE — PROGRESS NOTES
Discharge instructions, including information on new medications, were reviewed with patient and his wife. All questions were answered. IV and heart monitor were removed. Patient received a copy of his discharge papers and his prescriptions were elctronically sent to his pharmacy by the physician. Patient will be discharged home with his wife.

## 2018-11-21 NOTE — PROGRESS NOTES
Terrell Varma misael [address] 
[address] [facility], [id number] [place] [address] 
[phone] Medical Progress Note NAME: Eric Seymour :  1955 MRM:  [id number] Date/Time: 2018  9:01 AM 
 
 
  
Subjective: Chief Complaint:  HTN: blood pressure much better, wants to go home, denies symptoms ROS: 
(bold if positive, if negative) Tolerating Diet Objective:  
 
 
Vitals:  
 
 
  
Last 24hrs VS reviewed since prior progress note. Most recent are: 
 
Visit Vitals /75 (BP 1 Location: Right arm, BP Patient Position: At rest) Pulse (!) 57 Temp 98 °F (36.7 °C) Resp 18 Ht 5' 9\" (1.753 m) Wt 70.8 kg (156 lb) SpO2 92% BMI 23.04 kg/m² SpO2 Readings from Last 6 Encounters:  
18 92% 02/15/17 94% 06/12/15 98% 06/08/15 100% 04/10/15 97% Intake/Output Summary (Last 24 hours) at 2018 0901 Last data filed at 2018 0400 Gross per 24 hour Intake 240 ml Output  Net 240 ml Exam:  
 
Physical Exam: 
 
Gen:  Well-developed, well-nourished, in no acute distress HEENT:  Pink conjunctivae, PERRL, hearing intact to voice, moist mucous membranes Neck:  Supple, without masses, thyroid non-tender Resp:  No accessory muscle use, clear breath sounds without wheezes rales or rhonchi 
Card:  No murmurs, normal S1, S2 without thrills, bruits or peripheral edema Abd:  Soft, non-tender, non-distended, normoactive bowel sounds are present, no palpable organomegaly and no detectable hernias Lymph:  No cervical or inguinal adenopathy Musc:  No cyanosis or clubbing Skin:  No rashes or ulcers, skin turgor is good Neuro:  Cranial nerves are grossly intact, no focal motor weakness, follows commands appropriately Psych:  Good insight, oriented to person, place and time, alert Telemetry reviewed:   normal sinus rhythm Medications Reviewed: (see below) Lab Data Reviewed: (see below) ______________________________________________________________________ Medications:  
 
Current Facility-Administered Medications Medication Dose Route Frequency  cloNIDine HCl (CATAPRES) tablet 0.1 mg  0.1 mg Oral Q12H  polyethylene glycol (MIRALAX) packet 17 g  17 g Oral BID  methylPREDNISolone (PF) (SOLU-MEDROL) injection 40 mg  40 mg IntraVENous Q8H  
 albuterol (ACCUNEB) nebulizer solution 1.25 mg  1.25 mg Nebulization Q6H RT  
 docusate sodium (COLACE) capsule 100 mg  100 mg Oral BID  insulin NPH (NOVOLIN N, HUMULIN N) injection 20 Units  20 Units SubCUTAneous ACB&D  
 isosorbide mononitrate ER (IMDUR) tablet 60 mg  60 mg Oral DAILY  insulin lispro (HUMALOG) injection   SubCUTAneous AC&HS  budesonide (PULMICORT) 500 mcg/2 ml nebulizer suspension  500 mcg Nebulization BID RT  
 nebivolol (BYSTOLIC) tablet 10 mg  10 mg Oral QPM  
 bumetanide (BUMEX) tablet 1 mg  1 mg Oral DAILY  heparin (porcine) injection 5,000 Units  5,000 Units SubCUTAneous Q8H  
 hydrALAZINE (APRESOLINE) 20 mg/mL injection 20 mg  20 mg IntraVENous Q6H PRN  
 amLODIPine (NORVASC) tablet 10 mg  10 mg Oral DAILY  diphenhydrAMINE (BENADRYL) capsule 25 mg  25 mg Oral Q6H PRN  
 aspirin chewable tablet 81 mg  81 mg Oral DAILY  glucose chewable tablet 16 g  4 Tab Oral PRN  
 dextrose (D50W) injection syrg 12.5-25 g  12.5-25 g IntraVENous PRN  
 glucagon (GLUCAGEN) injection 1 mg  1 mg IntraMUSCular PRN  
 sodium chloride (NS) flush 5-10 mL  5-10 mL IntraVENous Q8H  
 sodium chloride (NS) flush 5-10 mL  5-10 mL IntraVENous PRN  
 acetaminophen (TYLENOL) tablet 650 mg  650 mg Oral Q4H PRN  
 oxyCODONE-acetaminophen (PERCOCET) 5-325 mg per tablet 1 Tab  1 Tab Oral Q4H PRN  
 HYDROmorphone (PF) (DILAUDID) injection 0.5 mg  0.5 mg IntraVENous Q4H PRN  prochlorperazine (COMPAZINE) injection 10 mg  10 mg IntraVENous Q6H PRN  
 zolpidem (AMBIEN) tablet 5 mg  5 mg Oral QHS PRN  
  albuterol (PROVENTIL VENTOLIN) nebulizer solution 2.5 mg  2.5 mg Nebulization Q2H PRN  
 nicotine (NICODERM CQ) 21 mg/24 hr patch 1 Patch  1 Patch TransDERmal Q24H  
 guaiFENesin-codeine (ROBITUSSIN AC) 100-10 mg/5 mL solution 5 mL  5 mL Oral Q4H PRN  
 atorvastatin (LIPITOR) tablet 80 mg  80 mg Oral QHS Lab Review:  
 
Recent Labs  
  11/21/18 0118 11/20/18 0054 11/19/18 
0505 WBC 15.4* 13.7* 14.3* HGB 11.3* 11.4* 12.7 HCT 34.6* 35.2* 38.8  188 196 Recent Labs  
  11/21/18 0118 11/20/18 0054 11/19/18 
0505  140 138  
K 3.5 3.7 4.0  
 104 102 CO2 28 28 26 * 173* 206* BUN 71* 67* 49* CREA 2.19* 2.07* 1.98* CA 8.0* 8.2* 8.7 MG 2.1 2.0 2.0 Lab Results Component Value Date/Time Glucose (POC) 165 (H) 11/21/2018 06:39 AM  
 Glucose (POC) 241 (H) 11/20/2018 08:38 PM  
 Glucose (POC) 213 (H) 11/20/2018 04:19 PM  
 Glucose (POC) 497 (H) 11/20/2018 11:34 AM  
 Glucose (POC) 582 (H) 11/20/2018 11:33 AM  
 
No results for input(s): PH, PCO2, PO2, HCO3, FIO2 in the last 72 hours. No results for input(s): INR in the last 72 hours. No lab exists for component: INREXT No results found for: SDES No results found for: CULT Assessment:  
 
Principal Problem: Hypertension () Active Problems: 
  CAD (coronary artery disease) () Overview: MI 18 Elevated serum creatinine (11/17/2018) (HFpEF) heart failure with preserved ejection fraction ([facility] (11/21/2018) DM (diabetes mellitus), type 2 ([facility] (11/21/2018) Constipation (11/21/2018) Plan:  
 
Principal Problem: Hypertension () - BP better controlled overall, no symptoms, not seeking perfect control right away Active Problems: 
  CAD (coronary artery disease) () 
 - continue cardiac meds, outpatient follow up with Cardiology Elevated serum creatinine (11/17/2018) - creatinine continues to rise - Renal following 
 - avoid nephrotoxic meds - may be due to BP effect 
 - on prednisone for CKD, wants to stop taking as below (HFpEF) heart failure with preserved ejection fraction ([facility] (11/21/2018) - improved with control of BP, outpatient follow up with Cardiology DM (diabetes mellitus), type 2 [facility] (11/21/2018) - possibly steroid induced - patient states he is not willing to take insulin as an outpatient 
 - wants to come off steroids rather than take meds to control BP, will let Renal address that Constipation (11/21/2018) - continue bowel regimen Total time spent in patient care: 35 minutes Care Plan discussed with: Patient, Care Manager, Nursing Staff and Bird Gray NP Discussed:  Code Status, Care Plan and D/C Planning Prophylaxis:  Hep SQ Disposition:  Home w/Family 
        
___________________________________________________ Attending Physician: Sparkle Palma MD

## 2018-11-21 NOTE — PROGRESS NOTES
NUTRITION education Nutrition Assessment:  
Consult received for DM diet education secondary to new dx of DM. Nutrition Diagnoses: 
Nutrition/food-related knowledge deficit related to lack of previous diet education as evidenced by no prior knowledge of need for nutrition-related recommendations. Intervention: 1. Nutrition education provided on CHO content of foods, proper portion sizes, appropriate beverages, snack ideas, etc.   
 
2. Nutrition Counseling including strategies for behavior modification, goal setting and planning Monitoring/Evaluation: Pt expressed understanding of education topics and acknowledged ability in applying diet goals although frustrated with having to make dietary changes. Agatha Rose, [address]

## 2018-11-21 NOTE — PROGRESS NOTES
Beginning of shift:  Bedside and Verbal shift change report given to Kei Novoa RN (oncoming nurse) by Ba Cárdenas RN (offgoing nurse). Report included the following information SBAR, Kardex, Procedure Summary, Intake/Output, Recent Results, Med Rec Status and Cardiac Rhythm NSR.

## 2018-11-21 NOTE — PROGRESS NOTES
Client has removed telemetry leads. Offered to place them back on but he states that he is \"going home\". Monitor tech is aware. Nephrology cleared patient for discharge. Will contact Dr. Chad Zaldivar.

## 2018-11-21 NOTE — DISCHARGE INSTRUCTIONS
HOSPITALIST DISCHARGE INSTRUCTIONS  NAME: Verner Griffiths   :  1955   MRN:  [id number]     Date/Time:  2018 11:08 AM    ADMIT DATE: 2018     DISCHARGE DATE: 2018     DISCHARGE DIAGNOSIS:  Uncontrolled HTN    MEDICATIONS:  · It is important that you take the medication exactly as they are prescribed. · Keep your medication in the bottles provided by the pharmacist and keep a list of the medication names, dosages, and times to be taken in your wallet. · Do not take other medications without consulting your doctor. Pain Management: per above medications    What to do at Home    Recommended diet:  Cardiac Diet and Diabetic Diet    Recommended activity: Activity as tolerated    If you experience any of the following symptoms then please call your primary care physician or return to the emergency room if you cannot get hold of your doctor:  Fever, chills, nausea, vomiting, diarrhea, change in mentation, falling, bleeding, shortness of breath    Follow Up: Follow-up Information     Follow up With Specialties Details Why Contact Info    Altagracia Benjamin MD Cardiology On 2018 2 pm [address]  [phone]      Ciara Limon MD Family Practice In 2 weeks  [address] [address]  [phone]              Information obtained by :  I understand that if any problems occur once I am at home I am to contact my physician. I understand and acknowledge receipt of the instructions indicated above.                                                                                                                                            Physician's or R.N.'s Signature                                                                  Date/Time                                                                                                                                              Patient or Representative Signature Date/Time  Monitor blood pressure twice a day. Please keep a log of your blood pressure to bring to your appt with Dr Joao Avelar next week. Avoiding Triggers With Heart Failure: Care Instructions  Your Care Instructions    Triggers are anything that make your heart failure flare up. A flare-up is also called \"sudden heart failure\" or \"acute heart failure. \" When you have a flare-up, fluid builds up in your lungs, and you have problems breathing. You might need to go to the hospital. By watching for changes in your condition and avoiding triggers, you can prevent heart failure flare-ups. Follow-up care is a key part of your treatment and safety. Be sure to make and go to all appointments, and call your doctor if you are having problems. It's also a good idea to know your test results and keep a list of the medicines you take. How can you care for yourself at home? Watch for changes in your weight and condition  · Weigh yourself without clothing at the same time each day. Record your weight. Call your doctor if you have sudden weight gain, such as more than 2 to 3 pounds in a day or 5 pounds in a week. (Your doctor may suggest a different range of weight gain.) A sudden weight gain may mean that your heart failure is getting worse. · Keep a daily record of your symptoms. Write down any changes in how you feel, such as new shortness of breath, cough, or problems eating. Also record if your ankles are more swollen than usual and if you feel more tired than usual. Note anything that you ate or did that could have triggered these changes. Limit sodium  Sodium causes your body to hold on to extra water. This may cause your heart failure symptoms to get worse. People get most of their sodium from processed foods. Fast food and restaurant meals also tend to be very high in sodium. · Your doctor may suggest that you limit sodium to 2,000 milligrams (mg) a day or less.  That is less than 1 teaspoon of salt a day, including all the salt you eat in cooking or in packaged foods. · Read food labels on cans and food packages. They tell you how much sodium you get in one serving. Check the serving size. If you eat more than one serving, you are getting more sodium. · Be aware that sodium can come in forms other than salt, including monosodium glutamate (MSG), sodium citrate, and sodium bicarbonate (baking soda). MSG is often added to Asian food. You can sometimes ask for food without MSG or salt. · Slowly reducing salt will help you adjust to the taste. Take the salt shaker off the table. · Flavor your food with garlic, lemon juice, onion, vinegar, herbs, and spices instead of salt. Do not use soy sauce, steak sauce, onion salt, garlic salt, mustard, or ketchup on your food, unless it is labeled \"low-sodium\" or \"low-salt. \"  · Make your own salad dressings, sauces, and ketchup without adding salt. · Use fresh or frozen ingredients, instead of canned ones, whenever you can. Choose low-sodium canned goods. · Eat less processed food and food from restaurants, including fast food. Exercise as directed  Moderate, regular exercise is very good for your heart. It improves your blood flow and helps control your weight. But too much exercise can stress your heart and cause a heart failure flare-up. · Check with your doctor before you start an exercise program.  · Walking is an easy way to get exercise. Start out slowly. Gradually increase the length and pace of your walk. Swimming, riding a bike, and using a treadmill are also good forms of exercise. · When you exercise, watch for signs that your heart is working too hard. You are pushing yourself too hard if you cannot talk while you are exercising. If you become short of breath or dizzy or have chest pain, stop, sit down, and rest.  · Do not exercise when you do not feel well. Take medicines correctly  · Take your medicines exactly as prescribed.  Call your doctor if you think you are having a problem with your medicine. · Make a list of all the medicines you take. Include those prescribed to you by other doctors and any over-the-counter medicines, vitamins, or supplements you take. Take this list with you when you go to any doctor. · Take your medicines at the same time every day. It may help you to post a list of all the medicines you take every day and what time of day you take them. · Make taking your medicine as simple as you can. Plan times to take your medicines when you are doing other things, such as eating a meal or getting ready for bed. This will make it easier to remember to take your medicines. · Get organized. Use helpful tools, such as daily or weekly pill containers. When should you call for help? Call 911 if you have symptoms of sudden heart failure such as:    · You have severe trouble breathing.     · You cough up pink, foamy mucus.     · You have a new irregular or rapid heartbeat.    Call your doctor now or seek immediate medical care if:    · You have new or increased shortness of breath.     · You are dizzy or lightheaded, or you feel like you may faint.     · You have sudden weight gain, such as more than 2 to 3 pounds in a day or 5 pounds in a week. (Your doctor may suggest a different range of weight gain.)     · You have increased swelling in your legs, ankles, or feet.     · You are suddenly so tired or weak that you cannot do your usual activities.    Watch closely for changes in your health, and be sure to contact your doctor if you develop new symptoms. Where can you learn more? Go to http://trent-heidi.info/. Enter E165 in the search box to learn more about \"Avoiding Triggers With Heart Failure: Care Instructions. \"  Current as of: December 6, 2017  Content Version: 11.8  © 8680-2215 Healthwise[employer].  Care instructions adapted under license by [employer] (which disclaims liability or warranty for this information). If you have questions about a medical condition or this instruction, always ask your healthcare professional. [address] any warranty or liability for your use of this information. Acute High Blood Pressure: Care Instructions  Your Care Instructions    Acute high blood pressure is very high blood pressure. It's a serious problem. Very high blood pressure can damage your brain, heart, eyes, and kidneys. You may have been given medicines to lower your blood pressure. You may have gotten them as pills or through a needle in one of your veins. This is called an IV. And maybe you were given other medicines too. These can be needed when high blood pressure causes other problems. To keep your blood pressure at a lower level, you may need to make healthy lifestyle changes. And you will probably need to take medicines. Be sure to follow up with your doctor about your blood pressure and what you can do about it. Follow-up care is a key part of your treatment and safety. Be sure to make and go to all appointments, and call your doctor if you are having problems. It's also a good idea to know your test results and keep a list of the medicines you take. How can you care for yourself at home? · See your doctor as often as he or she recommends. This is to make sure your blood pressure is under control. You will probably go at least 2 times a year. But it may be more often at first.  · Take your blood pressure medicine exactly as prescribed. You may take one or more types. They include diuretics, beta-blockers, ACE inhibitors, calcium channel blockers, and angiotensin II receptor blockers. Call your doctor if you think you are having a problem with your medicine. · If you take blood pressure medicine, talk to your doctor before you take decongestants or anti-inflammatory medicine, such as ibuprofen. These can raise blood pressure.   · Learn how to check your blood pressure at home. Check it often. · Ask your doctor if it's okay to drink alcohol. · Talk to your doctor about lifestyle changes that can help blood pressure. These include being active and not smoking. When should you call for help? Call 911 anytime you think you may need emergency care. This may mean having symptoms that suggest that your blood pressure is causing a serious heart or blood vessel problem. Your blood pressure may be over 180/120.   For example, call 911 if:    · You have symptoms of a heart attack. These may include:  ? Chest pain or pressure, or a strange feeling in the chest.  ? Sweating. ? Shortness of breath. ? Nausea or vomiting. ? Pain, pressure, or a strange feeling in the back, neck, jaw, or upper belly or in one or both shoulders or arms. ? Lightheadedness or sudden weakness. ? A fast or irregular heartbeat.     · You have symptoms of a stroke. These may include:  ? Sudden numbness, tingling, weakness, or loss of movement in your face, arm, or leg, especially on only one side of your body. ? Sudden vision changes. ? Sudden trouble speaking. ? Sudden confusion or trouble understanding simple statements. ? Sudden problems with walking or balance. ? A sudden, severe headache that is different from past headaches.     · You have severe back or belly pain.    Do not wait until your blood pressure comes down on its own. Get help right away.   Call your doctor now or seek immediate care if:    · Your blood pressure is much higher than normal (such as 180/120 or higher), but you don't have symptoms.     · You think high blood pressure is causing symptoms, such as:  ? Severe headache.  ? Blurry vision.    Watch closely for changes in your health, and be sure to contact your doctor if:    · Your blood pressure measures higher than your doctor recommends at least 2 times.  That means the top number is higher or the bottom number is higher, or both.     · You think you may be having side effects from your blood pressure medicine. Where can you learn more? Go to http://trent-heidi.info/. Enter P727 in the search box to learn more about \"Acute High Blood Pressure: Care Instructions. \"  Current as of: December 6, 2017  Content Version: 11.8  © 9698-0877 [employer]. Care instructions adapted under license by [employer] (which disclaims liability or warranty for this information). If you have questions about a medical condition or this instruction, always ask your healthcare professional. [address] any warranty or liability for your use of this information. Stopping Smoking: Care Instructions  Your Care Instructions  Cigarette smokers crave the nicotine in cigarettes. Giving it up is much harder than simply changing a habit. Your body has to stop craving the nicotine. It is hard to quit, but you can do it. There are many tools that people use to quit smoking. You may find that combining tools works best for you. There are several steps to quitting. First you get ready to quit. Then you get support to help you. After that, you learn new skills and behaviors to become a nonsmoker. For many people, a necessary step is getting and using medicine. Your doctor will help you set up the plan that best meets your needs. You may want to attend a smoking cessation program to help you quit smoking. When you choose a program, look for one that has proven success. Ask your doctor for ideas. You will greatly increase your chances of success if you take medicine as well as get counseling or join a cessation program.  Some of the changes you feel when you first quit tobacco are uncomfortable. Your body will miss the nicotine at first, and you may feel short-tempered and grumpy. You may have trouble sleeping or concentrating. Medicine can help you deal with these symptoms.  You may struggle with changing your smoking habits and rituals. The last step is the tricky one: Be prepared for the smoking urge to continue for a time. This is a lot to deal with, but keep at it. You will feel better. Follow-up care is a key part of your treatment and safety. Be sure to make and go to all appointments, and call your doctor if you are having problems. It's also a good idea to know your test results and keep a list of the medicines you take. How can you care for yourself at home? · Ask your family, friends, and coworkers for support. You have a better chance of quitting if you have help and support. · Join a support group, such as Nicotine Anonymous, for people who are trying to quit smoking. · Consider signing up for a smoking cessation program, such as the American Lung Association's Freedom from Smoking program.  · Get text messaging support. Go to the website at www.smokefree. gov to sign up for the [facility] program.  · Set a quit date. Pick your date carefully so that it is not right in the middle of a big deadline or stressful time. Once you quit, do not even take a puff. Get rid of all ashtrays and lighters after your last cigarette. Clean your house and your clothes so that they do not smell of smoke. · Learn how to be a nonsmoker. Think about ways you can avoid those things that make you reach for a cigarette. ? Avoid situations that put you at greatest risk for smoking. For some people, it is hard to have a drink with friends without smoking. For others, they might skip a coffee break with coworkers who smoke. ? Change your daily routine. Take a different route to work or eat a meal in a different place. · Cut down on stress. Calm yourself or release tension by doing an activity you enjoy, such as reading a book, taking a hot bath, or gardening. · Talk to your doctor or pharmacist about nicotine replacement therapy, which replaces the nicotine in your body.  You still get nicotine but you do not use tobacco. Nicotine replacement products help you slowly reduce the amount of nicotine you need. These products come in several forms, many of them available over-the-counter:  ? Nicotine patches  ? Nicotine gum and lozenges  ? Nicotine inhaler  · Ask your doctor about bupropion (Wellbutrin) or varenicline (Chantix), which are prescription medicines. They do not contain nicotine. They help you by reducing withdrawal symptoms, such as stress and anxiety. · Some people find hypnosis, acupuncture, and massage helpful for ending the smoking habit. · Eat a healthy diet and get regular exercise. Having healthy habits will help your body move past its craving for nicotine. · Be prepared to keep trying. Most people are not successful the first few times they try to quit. Do not get mad at yourself if you smoke again. Make a list of things you learned and think about when you want to try again, such as next week, next month, or next year. Where can you learn more? Go to http://trent-heidi.info/. Enter Z915 in the search box to learn more about \"Stopping Smoking: Care Instructions. \"  Current as of: November 29, 2017  Content Version: 11.8  © 6064-0322 Healthwise, [place]. Care instructions adapted under license by [employer] (which disclaims liability or warranty for this information). If you have questions about a medical condition or this instruction, always ask your healthcare professional. [address] any warranty or liability for your use of this information.

## 2018-11-21 NOTE — PROGRESS NOTES
Pharmacist Discharge Medication Reconciliation Discharge Provider:  Dustin Holcomb MD 
   
  
Discharge Medications: My Medications START taking these medications Instructions Each Dose to Equal Morning Noon Evening Bedtime  
aspirin 81 mg chewable tablet Start taking on:  11/22/2018 Notes to patient:  Using as a mild blood thinner Take 1 Tab by mouth daily. 81 mg Blood-Glucose Meter monitoring kit Check blood sugar twice a day 
       
budesonide-formoterol 80-4.5 mcg/actuation Hfaa Commonly known as:  SYMBICORT Take 2 Puffs by inhalation two (2) times a day. 2 Puff 
      
bumetanide 0.5 mg tablet Commonly known as:  Digna Brill Start taking on:  11/22/2018 Notes to patient:  Fluid pill / diuretic Take 1 Tab by mouth daily. 0.5 mg 
      
cloNIDine HCl 0.1 mg tablet Commonly known as:  CATAPRES Notes to patient:  New BP medication Take 1 Tab by mouth every twelve (12) hours. 0.1 mg 
      
ipratropium-albuterol  mcg/actuation inhaler Commonly known as:  Mary Neighbor Take 1 Puff by inhalation every six (6) hours. 1 Puff 
      
isosorbide mononitrate ER 60 mg CR tablet Commonly known as:  IMDUR Start taking on:  11/22/2018 Notes to patient:  Medication to help control BP (and chronic chest pain). Called a Nitrate Take 1 Tab by mouth daily. 60 mg 
      
nebivolol 5 mg tablet Commonly known as:  BYSTOLIC Notes to patient:  Medication to control BP and increase heart function. Called a beta-blocker Take 1 Tab by mouth daily. 5 mg CHANGE how you take these medications Instructions Each Dose to Equal Morning Noon Evening Bedtime  
predniSONE 10 mg tablet Commonly known as:  Gallo Jaimes What changed:   
· how much to take · how to take this · when to take this 
· additional instructions 40 mg TWICE a day for 3 days, then 40 mg once a day and wean further after follow up with Dr. Khoa Rios CONTINUE taking these medications Instructions Each Dose to Equal Morning Noon Evening Bedtime  
amLODIPine 10 mg tablet Commonly known as:  Pasha Cochran Take 1 Tab by mouth daily. 10 mg 
      
LIPITOR 80 mg tablet Generic drug:  atorvastatin Take 80 mg by mouth daily. 80 mg Where to Get Your Medications These medications were sent to [facility], [address] [place] [address] Phone:  [phone] · aspirin 81 mg chewable tablet · Blood-Glucose Meter monitoring kit · budesonide-formoterol 80-4.5 mcg/actuation Hfaa · bumetanide 0.5 mg tablet · cloNIDine HCl 0.1 mg tablet · ipratropium-albuterol  mcg/actuation inhaler · isosorbide mononitrate ER 60 mg CR tablet · nebivolol 5 mg tablet · predniSONE 10 mg tablet The patient's chart, MAR, and AVS were reviewed by Chichi Tidwell, PHARMD, Contact: [phone]

## 2018-11-21 NOTE — PROGRESS NOTES
Terrell Varma Valerianomisael [place] [address] Helm YOB: 1955 Assessment & Plan: 1. CKD 3/ Presumtive FSGS ( Biopsty 1 glm) · Severe Proteinuria: 10 gm, here 9 gm · Cr slightly worse with loops and better BP control · Prednisone : prescribed 60, he wastaking 40, resumed 20 mg tid: now on iv steroids for pulm reasons · Cr 1.6-1.9 mg per out pt labs · We will dw DR. Jono Bobby about PCP prophylaxis and GI prophylaxis 2. HTN urgency · Amlodipine 10, coreg 12.5 mg bid,Clonidine,Bumex · May add Losartan out pt · Renal duplex:not able to see renal arteries · ACE Caused cough 3. Edema · CKD,Proteinuria,ccb · Duplex leg: no DVT · Diuretics 4. Smoker PLAN 
- Reduce Bumex 0.5 mg for HTN,Edema 
- no ARB yet 
- home on Pred 20 tid 
- apt next week with CKD clinic 
- home bp readings: goal  110-130/80 Subjective:  
CC:arf HPI: Patient seen CKD: Cr 2.2 Feels better BP better Edema better ROS:neg for 12 sys except above Current Facility-Administered Medications Medication Dose Route Frequency  cloNIDine HCl (CATAPRES) tablet 0.1 mg  0.1 mg Oral Q12H  polyethylene glycol (MIRALAX) packet 17 g  17 g Oral BID  methylPREDNISolone (PF) (SOLU-MEDROL) injection 40 mg  40 mg IntraVENous Q8H  
 albuterol (ACCUNEB) nebulizer solution 1.25 mg  1.25 mg Nebulization Q6H RT  
 [START ON 11/22/2018] bumetanide (BUMEX) tablet 0.5 mg  0.5 mg Oral DAILY  nebivolol (BYSTOLIC) tablet 5 mg  5 mg Oral QPM  
 docusate sodium (COLACE) capsule 100 mg  100 mg Oral BID  insulin NPH (NOVOLIN N, HUMULIN N) injection 20 Units  20 Units SubCUTAneous ACB&D  
 isosorbide mononitrate ER (IMDUR) tablet 60 mg  60 mg Oral DAILY  insulin lispro (HUMALOG) injection   SubCUTAneous AC&HS  budesonide (PULMICORT) 500 mcg/2 ml nebulizer suspension  500 mcg Nebulization BID RT  
 heparin (porcine) injection 5,000 Units  5,000 Units SubCUTAneous Q8H  
  hydrALAZINE (APRESOLINE) 20 mg/mL injection 20 mg  20 mg IntraVENous Q6H PRN  
 amLODIPine (NORVASC) tablet 10 mg  10 mg Oral DAILY  diphenhydrAMINE (BENADRYL) capsule 25 mg  25 mg Oral Q6H PRN  
 aspirin chewable tablet 81 mg  81 mg Oral DAILY  glucose chewable tablet 16 g  4 Tab Oral PRN  
 dextrose (D50W) injection syrg 12.5-25 g  12.5-25 g IntraVENous PRN  
 glucagon (GLUCAGEN) injection 1 mg  1 mg IntraMUSCular PRN  
 sodium chloride (NS) flush 5-10 mL  5-10 mL IntraVENous Q8H  
 sodium chloride (NS) flush 5-10 mL  5-10 mL IntraVENous PRN  
 acetaminophen (TYLENOL) tablet 650 mg  650 mg Oral Q4H PRN  
 oxyCODONE-acetaminophen (PERCOCET) 5-325 mg per tablet 1 Tab  1 Tab Oral Q4H PRN  
 HYDROmorphone (PF) (DILAUDID) injection 0.5 mg  0.5 mg IntraVENous Q4H PRN  prochlorperazine (COMPAZINE) injection 10 mg  10 mg IntraVENous Q6H PRN  
 zolpidem (AMBIEN) tablet 5 mg  5 mg Oral QHS PRN  
 albuterol (PROVENTIL VENTOLIN) nebulizer solution 2.5 mg  2.5 mg Nebulization Q2H PRN  
 nicotine (NICODERM CQ) 21 mg/24 hr patch 1 Patch  1 Patch TransDERmal Q24H  
 guaiFENesin-codeine (ROBITUSSIN AC) 100-10 mg/5 mL solution 5 mL  5 mL Oral Q4H PRN  
 atorvastatin (LIPITOR) tablet 80 mg  80 mg Oral QHS Objective:  
 
Vitals: 
Blood pressure 172/75, pulse (!) 57, temperature 98 °F (36.7 °C), resp. rate 18, height 5' 9\" (1.753 m), weight 70.8 kg (156 lb), SpO2 92 %. Temp (24hrs), Av.7 °F (36.5 °C), Min:96.8 °F (36 °C), Max:98.3 °F (36.8 °C) Intake and Output: 
No intake/output data recorded.  1901 -  0700 In: 0 [P.O.:816] Out: 950 [Urine:950] Physical Exam:              
 Patient is intubated:  no 
 
Physical Examination:  
GENERAL ASSESSMENT: NAD HEENT:Nontraumatic CHEST: rhonchi HEART: S1S2 ABDOMEN: Soft,NT, 
:Maxwell: n EXTREMITY: EDEMA  better NEURO:Grossly non focal 
 
 
   
ECG/rhythm: 
 
Data Review No results for input(s): TNIPOC in the last 72 hours. No lab exists for component: ITNL No results for input(s): CPK, CKMB, TROIQ in the last 72 hours. Recent Labs  
  11/21/18 
0118 11/20/18 
0054 11/19/18 
0505  140 138  
K 3.5 3.7 4.0  
 104 102 CO2 28 28 26 BUN 71* 67* 49* CREA 2.19* 2.07* 1.98* * 173* 206* MG 2.1 2.0 2.0  
CA 8.0* 8.2* 8.7 WBC 15.4* 13.7* 14.3* HGB 11.3* 11.4* 12.7 HCT 34.6* 35.2* 38.8  188 196 No results for input(s): INR, PTP, APTT in the last 72 hours. No lab exists for component: INREXT, INREXT Needs: urine analysis, urine sodium, protein and creatinine Lab Results Component Value Date/Time Creatinine, urine 81.33 11/17/2018 11:20 AM  
 
 
 
Discussed with:  Colleague, Nursing 
 
: Jonny Mendiola MD 
11/21/2018 [facility]: 
[email or web address] [place] office: 
[address] [place] [address], [address] [place], [id number] [place] [address] Phone: [phone] Fax :     [phone] [place] office: 
[address] [facility], [address] [facility] Phone - [phone] Fax - [phone]

## 2018-11-22 LAB
ALDOST SERPL-MCNC: 10.7 NG/DL (ref 0–30)
RENIN PLAS-CCNC: 1.99 NG/ML/HR (ref 0.17–5.38)
SPECIMEN SOURCE: NORMAL

## 2018-11-27 ENCOUNTER — OFFICE VISIT (OUTPATIENT)
Dept: CARDIOLOGY CLINIC | Age: 63
End: 2018-11-27

## 2018-11-27 VITALS
DIASTOLIC BLOOD PRESSURE: 80 MMHG | WEIGHT: 156 LBS | HEART RATE: 60 BPM | BODY MASS INDEX: 23.11 KG/M2 | SYSTOLIC BLOOD PRESSURE: 168 MMHG | HEIGHT: 69 IN

## 2018-11-27 DIAGNOSIS — I10 ESSENTIAL HYPERTENSION: ICD-10-CM

## 2018-11-27 DIAGNOSIS — I25.10 CORONARY ARTERY DISEASE INVOLVING NATIVE CORONARY ARTERY OF NATIVE HEART WITHOUT ANGINA PECTORIS: ICD-10-CM

## 2018-11-27 DIAGNOSIS — I50.30 (HFPEF) HEART FAILURE WITH PRESERVED EJECTION FRACTION (HCC): Primary | ICD-10-CM

## 2018-11-27 DIAGNOSIS — I73.9 PVD (PERIPHERAL VASCULAR DISEASE) (HCC): ICD-10-CM

## 2018-11-27 RX ORDER — NEBIVOLOL 10 MG/1
10 TABLET ORAL DAILY
Qty: 42 TAB | Refills: 0 | Status: SHIPPED | COMMUNITY
Start: 2018-11-27 | End: 2019-01-03

## 2018-11-27 RX ORDER — NEBIVOLOL 10 MG/1
10 TABLET ORAL DAILY
Qty: 30 TAB | Refills: 12 | Status: SHIPPED | OUTPATIENT
Start: 2018-11-27 | End: 2018-11-27 | Stop reason: SDUPTHER

## 2018-11-27 RX ORDER — BUMETANIDE 1 MG/1
1 TABLET ORAL DAILY
Qty: 30 TAB | Refills: 12 | Status: SHIPPED | OUTPATIENT
Start: 2018-11-27

## 2018-11-27 RX ORDER — LANCETS
EACH MISCELLANEOUS
Qty: 1 EACH | Refills: 0 | Status: SHIPPED | OUTPATIENT
Start: 2018-11-27

## 2018-11-27 NOTE — PROGRESS NOTES
Arden Brown MD. [facility] [place]              Patient: Ryan Culp  : 1955      Today's Date: 2018            HISTORY OF PRESENT ILLNESS:     History of Present Illness:  Here for follow-up since hospital stay. Still SOB and coughing. BP still high. Leg swelling persists. Has orthopnea and coughing when laying down. No CP. PAST MEDICAL HISTORY:     Past Medical History:   Diagnosis Date    (HFpEF) heart failure with preserved ejection fraction ([address] 2018    Acute CVA (cerebrovascular accident) ([address] 2/15/2017    Per clinical exam    CAD (coronary artery disease)     MI ; stenting x 2 in      Carotid artery disease ([address]     Cough     Diplopia 2017    DM (diabetes mellitus), type 2 ([address] 2018    Fatigue     FSGS (focal segmental glomerulosclerosis)     Headache     History of CVA (cerebrovascular accident) 2017    CT head: chronic L BG infarct    Hyperlipidemia     Hypertension     Mitral regurgitation     Mod-severe MR in     Muscle weakness     PVD (peripheral vascular disease) ([address]        Past Surgical History:   Procedure Laterality Date    CARDIAC SURG PROCEDURE UNLIST      4 cardiac stents, 3 stens to L leg, 2 stens R leg, 1 stent to abdomen    HX MOHS PROCEDURES Right 2015    HX ORTHOPAEDIC Left 2015    rotator cuff manipulation    NEUROLOGICAL PROCEDURE UNLISTED      EMG         MEDICATIONS:     Current Outpatient Medications   Medication Sig Dispense Refill    nebivolol (BYSTOLIC) 5 mg tablet Take 1 Tab by mouth daily. 30 Tab 1    bumetanide (BUMEX) 0.5 mg tablet Take 1 Tab by mouth daily. 30 Tab 1    cloNIDine HCl (CATAPRES) 0.1 mg tablet Take 1 Tab by mouth every twelve (12) hours. 60 Tab 1    isosorbide mononitrate ER (IMDUR) 60 mg CR tablet Take 1 Tab by mouth daily. 30 Tab 1    aspirin 81 mg chewable tablet Take 1 Tab by mouth daily.  1 Tab 1    predniSONE (DELTASONE) 10 mg tablet 40 mg TWICE a day for 3 days, then 40 mg once a day and wean further after follow up with Dr. Adrian Alas 60 Tab 0    budesonide-formoterol (SYMBICORT) 80-4.5 mcg/actuation HFAA Take 2 Puffs by inhalation two (2) times a day. 1 Inhaler 0    ipratropium-albuterol (COMBIVENT RESPIMAT)  mcg/actuation inhaler Take 1 Puff by inhalation every six (6) hours. 1 Inhaler 0    atorvastatin (LIPITOR) 80 mg tablet Take 80 mg by mouth daily.  amLODIPine (NORVASC) 10 mg tablet Take 1 Tab by mouth daily. 30 Tab 1    Blood-Glucose Meter monitoring kit Check blood sugar twice a day 1 Kit 0       Allergies   Allergen Reactions    Ace Inhibitors Cough             SOCIAL HISTORY:     Social History     Tobacco Use    Smoking status: Current Every Day Smoker     Packs/day: 0.25     Types: Cigarettes     Last attempt to quit: 2016     Years since quittin.7    Smokeless tobacco: Current User   Substance Use Topics    Alcohol use: Yes     Alcohol/week: 12.0 oz     Types: 24 Cans of beer per week     Comment: socially    Drug use: No         FAMILY HISTORY:     Family History   Problem Relation Age of Onset    Heart Disease Brother             REVIEW OF SYMPTOMS:      Review of Symptoms:  Constitutional: Negative for fever, chills  HEENT: Negative for nosebleeds, tinnitus, and vision changes. Respiratory: + FIGUEROA, cough (brown sputum)   Cardiovascular: + orthopnea, PND  Gastrointestinal: Negative for abdominal pain, diarrhea, melena. Genitourinary: Negative for dysuria  Musculoskeletal: Negative for myalgias. Skin: Negative for rash  Heme: No problems bleeding. Neurological: Negative for speech change and focal weakness.               PHYSICAL EXAM:      Physical Exam:    Visit Vitals  /80   Pulse 60   Ht 5' 9\" (1.753 m)   Wt 156 lb (70.8 kg)   BMI 23.04 kg/m²       Patient appears generally well, mood and affect are appropriate and pleasant. HEENT:  Hearing intact, non-icteric, normocephalic, atraumatic.    Neck Exam: Supple, + bilat bruits   Lung Exam: Clear ant   Cardiac Exam: Regular rate and rhythm with 3/6 systolic murmur   Abdomen: Soft, non-tender, obese   Extremities: Moves all ext well. 1-2+ lower extremity edema. Psych: Appropriate affect  Neuro - Grossly intact           LABS / OTHER STUDIES:      Lab Results   Component Value Date/Time    Sodium 143 11/21/2018 01:18 AM    Potassium 3.5 11/21/2018 01:18 AM    Chloride 106 11/21/2018 01:18 AM    CO2 28 11/21/2018 01:18 AM    Anion gap 9 11/21/2018 01:18 AM    Glucose 148 (H) 11/21/2018 01:18 AM    BUN 71 (H) 11/21/2018 01:18 AM    Creatinine 2.19 (H) 11/21/2018 01:18 AM    BUN/Creatinine ratio 32 (H) 11/21/2018 01:18 AM    GFR est AA 37 (L) 11/21/2018 01:18 AM    GFR est non-AA 30 (L) 11/21/2018 01:18 AM    Calcium 8.0 (L) 11/21/2018 01:18 AM    Bilirubin, total 0.6 11/17/2018 02:43 AM    AST (SGOT) 18 11/17/2018 02:43 AM    Alk.  phosphatase 115 11/17/2018 02:43 AM    Protein, total 6.0 (L) 11/17/2018 02:43 AM    Albumin 2.8 (L) 11/17/2018 02:43 AM    Globulin 3.2 11/17/2018 02:43 AM    A-G Ratio 0.9 (L) 11/17/2018 02:43 AM    ALT (SGPT) 34 11/17/2018 02:43 AM     Lab Results   Component Value Date/Time    WBC 15.4 (H) 11/21/2018 01:18 AM    Hemoglobin (POC) 15.0 09/05/2010 08:30 AM    HGB 11.3 (L) 11/21/2018 01:18 AM    Hematocrit (POC) 44 09/05/2010 08:30 AM    HCT 34.6 (L) 11/21/2018 01:18 AM    PLATELET 412 24/02/5315 01:18 AM    MCV 91.5 11/21/2018 01:18 AM     Lab Results   Component Value Date/Time    Cholesterol, total 178 02/15/2017 05:51 AM    HDL Cholesterol 49 02/15/2017 05:51 AM    LDL,Direct 121 (H) 02/15/2017 05:51 AM    LDL, calculated 94 02/15/2017 05:51 AM    VLDL, calculated 35 02/15/2017 05:51 AM    Triglyceride 175 (H) 02/15/2017 05:51 AM    CHOL/HDL Ratio 3.6 02/15/2017 05:51 AM     Component      Latest Ref Rng & Units 11/19/2018 11/19/2018           4:45 AM  4:45 AM   Source       BLOOD    Renin Activity      0.167 - 5.380 ng/mL/hr 1.993    Aldosterone 0.0 - 30.0 ng/dL 10.7    Normetanephrine, free      0 - 145 pg/mL  130   Metanephrine, free      0 - 62 pg/mL  59           CARDIAC DIAGNOSTICS:      Cardiac Evaluation Includes:         Cath (8/3/12): LM ok, 50% mid LAD, mid 70% stenosis in LCX. RCA is dominant and completely occluded proximally (filled by collaterals)   -->  He received 2 stents in the LCX. (promus)     KELLEY's - 8/10/13: Right ankle arm 0.72 and left 0.52     Echo 8/13 - EF 55% mild LAE, modeerate Mr     Echo (4/17/15) - LVEF 55%  There was akinesis of the basal inferior wall(s). There was severe hypokinesis of the basal inferoseptal and basal inferolateral wall(s). Mod-severe MR.       Carotid Doppler 4/15 -  16-49% stenosis bilaterally     Lexiscan Cardiolite 3/16 - partially reversible inferior wall defect. SSS = 5, SRS = 1, SDS - 4. LVEF 54%     Echo 3/31/16 - LVEF 55-60%, akinesis of inferior wall. Severe MR.      Echo 2/14/17 - LVEF 55-60%, LAE. Neg bubble study. Mild MR.      Carotid Doppler 2/17 - mild disease bilat      Echo 11/17/18 - Mod-marked LVH. LVEF 50 %. There was hypokinesis of the basal inferior wall(s). Grade 2 diastolic dysfunction. RV normal.  Mod LAE. Mild MAC and mild MR. Renal artery dopplers 11/18/18 - kidney size normal.  Bilateral intrinsic/medical renal disease identified based on the R. Is. The bilateral renal arteries were not visualized.     EKG 11/17/18 - NSR, LVH, PRWP     CXR 11/17/18 - no acute process, calcified right pleural plaque            ASSESSMENT AND PLAN:       Assessment and Plan:  Mr. Mix is a 58 y.o.   male with a history of CAD, CKD, HTN, CVA who presents to hospital on 11/17/18 complaining of SOB and chest pain.  He's had FIGUEROA for several weeks with orthopnea as well.  His blood pressure on admission was 240/97. 1) Hypertensive Urgency 11/18 and HFpEF  - Suspect a lot of his problems (diastolic HF, angina, elevated trop) could be due to uncontrolled HTN.    - Renal US 11/18 was unable to visualize arteries due to bowel gas   - Kyree/renin ratio and serum metanephrines were normal   - Have switched BB to Bystolic (given his wheezing issues and concern of bronchospasm with other BB) (however this change has not improved SOB any)   - On 11/27/18 - BP remains high. Will increase Bystolic to 10 mg daily. Will increase Bumex to 1 mg daily. He sees Dr. Aung Abbott next week and will have labs checked.    2) CAD and chest pain; elevated troponin    - he has known CAD with his RCA chronically occluded. - Will treat medically for CAD and control his BP first  - consider cath if he continues to have anginal symptoms   - Cont statin, ASA, BB, Imdur     - smoking cessation advised   - On 11/27/18 - he denies any chest pain      3) Pulmonary plaquing d/t Asbestos  - Has seen pulmonary - plan was for PFTs OP     4) Moderate Carotid artery disease   - statin, ASA,   - serial carotid studies      5) History of MR  - loud murmur at apex (positional ) but mild MR per recent echo  --> follow on serial studies      6) SOPHIA/CKD (presumed FSGS)  - nephrology following    - he sees Dr. Aung Abbott next week.    7) See me in 2 weeks. Patient expressed understanding of the plan - questions were answered. Retired (from [employer] / [employer]). 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    [address], [place], [address]  [address] [place] [address], [address]      [id number] [place] [address]  [address]  [facility], [address] [place] [address]  Ph: [phone]                                [phone]

## 2018-12-11 ENCOUNTER — OFFICE VISIT (OUTPATIENT)
Dept: CARDIOLOGY CLINIC | Age: 63
End: 2018-12-11

## 2018-12-11 VITALS
HEART RATE: 64 BPM | WEIGHT: 149.2 LBS | SYSTOLIC BLOOD PRESSURE: 160 MMHG | BODY MASS INDEX: 22.1 KG/M2 | DIASTOLIC BLOOD PRESSURE: 64 MMHG | HEIGHT: 69 IN

## 2018-12-11 DIAGNOSIS — I10 ESSENTIAL HYPERTENSION: Primary | Chronic | ICD-10-CM

## 2018-12-11 DIAGNOSIS — I25.10 CORONARY ARTERY DISEASE INVOLVING NATIVE CORONARY ARTERY OF NATIVE HEART WITHOUT ANGINA PECTORIS: ICD-10-CM

## 2018-12-11 DIAGNOSIS — I50.30 (HFPEF) HEART FAILURE WITH PRESERVED EJECTION FRACTION (HCC): ICD-10-CM

## 2018-12-11 RX ORDER — ASPIRIN 325 MG
325 TABLET, DELAYED RELEASE (ENTERIC COATED) ORAL DAILY
COMMUNITY
End: 2018-12-11 | Stop reason: CLARIF

## 2018-12-11 RX ORDER — GUAIFENESIN 100 MG/5ML
81 LIQUID (ML) ORAL DAILY
Qty: 30 TAB | Refills: 12
Start: 2018-12-11

## 2018-12-11 RX ORDER — MINOXIDIL 2.5 MG/1
2.5 TABLET ORAL 2 TIMES DAILY
Qty: 60 TAB | Refills: 6 | Status: SHIPPED | OUTPATIENT
Start: 2018-12-11 | End: 2019-01-03

## 2018-12-11 RX ORDER — BUPROPION HYDROCHLORIDE 100 MG/1
TABLET ORAL
Qty: 90 TAB | Refills: 3 | Status: SHIPPED | OUTPATIENT
Start: 2018-12-11

## 2018-12-11 NOTE — PROGRESS NOTES
Karen Gooden MD. [facility] [place]              Patient: Sen Bradford  : 1955      Today's Date: 2018            HISTORY OF PRESENT ILLNESS:     History of Present Illness:  Here for follow-up. His swelling has gone done. BP still high at home - 190/60. He is feeling OK. Doesn't sleep much. No CP or SOB. PAST MEDICAL HISTORY:     Past Medical History:   Diagnosis Date    (HFpEF) heart failure with preserved ejection fraction ([address] 2018    Acute CVA (cerebrovascular accident) ([address] 2/15/2017    Per clinical exam    CAD (coronary artery disease)     MI ; stenting x 2 in      Carotid artery disease ([address]     Cough     Diplopia 2017    DM (diabetes mellitus), type 2 ([address] 2018    Fatigue     FSGS (focal segmental glomerulosclerosis)     Headache     History of CVA (cerebrovascular accident) 2017    CT head: chronic L BG infarct    Hyperlipidemia     Hypertension     Mitral regurgitation     Mod-severe MR in     Muscle weakness     PVD (peripheral vascular disease) ([address]        Past Surgical History:   Procedure Laterality Date    CARDIAC SURG PROCEDURE UNLIST      4 cardiac stents, 3 stens to L leg, 2 stens R leg, 1 stent to abdomen    HX MOHS PROCEDURES Right 2015    HX ORTHOPAEDIC Left 2015    rotator cuff manipulation    NEUROLOGICAL PROCEDURE UNLISTED      EMG           MEDICATIONS:     Current Outpatient Medications   Medication Sig Dispense Refill    aspirin delayed-release 325 mg tablet Take 325 mg by mouth daily.  bumetanide (BUMEX) 1 mg tablet Take 1 Tab by mouth daily. 30 Tab 12    nebivolol (BYSTOLIC) 10 mg tablet Take 1 Tab by mouth daily. 42 Tab 0    cloNIDine HCl (CATAPRES) 0.1 mg tablet Take 1 Tab by mouth every twelve (12) hours. 60 Tab 1    isosorbide mononitrate ER (IMDUR) 60 mg CR tablet Take 1 Tab by mouth daily.  30 Tab 1    predniSONE (DELTASONE) 10 mg tablet 40 mg TWICE a day for 3 days, then 40 mg once a day and wean further after follow up with Dr. Barbara Thomas 60 Tab 0    budesonide-formoterol (SYMBICORT) 80-4.5 mcg/actuation HFAA Take 2 Puffs by inhalation two (2) times a day. 1 Inhaler 0    ipratropium-albuterol (COMBIVENT RESPIMAT)  mcg/actuation inhaler Take 1 Puff by inhalation every six (6) hours. 1 Inhaler 0    atorvastatin (LIPITOR) 80 mg tablet Take 80 mg by mouth daily.  amLODIPine (NORVASC) 10 mg tablet Take 1 Tab by mouth daily. 30 Tab 1    Lancets misc For Accucheck 1 Each 0    glucose blood VI test strips (ASCENSIA AUTODISC VI, ONE TOUCH ULTRA TEST VI) strip For Accucheck 50 Strip 0    Blood-Glucose Meter monitoring kit Check blood sugar twice a day 1 Kit 0       Allergies   Allergen Reactions    Ace Inhibitors Cough             SOCIAL HISTORY:     Social History     Tobacco Use    Smoking status: Current Every Day Smoker     Packs/day: 0.25     Types: Cigarettes     Last attempt to quit: 2016     Years since quittin.7    Smokeless tobacco: Current User   Substance Use Topics    Alcohol use: Yes     Alcohol/week: 12.0 oz     Types: 24 Cans of beer per week     Comment: socially    Drug use: No         FAMILY HISTORY:     Family History   Problem Relation Age of Onset    Heart Disease Brother               REVIEW OF SYMPTOMS:      Review of Symptoms:  Constitutional: Negative for fever, chills  HEENT: Negative for nosebleeds, tinnitus, and vision changes. Respiratory: + occ cough (brown sputum mild)  Cardiovascular: No orthopnea or PND  Gastrointestinal: Negative for abdominal pain, diarrhea, melena. Genitourinary: Negative for dysuria  Musculoskeletal: Negative for myalgias. Skin: Negative for rash  Heme: No problems bleeding.   Neurological: Negative for speech change and focal weakness.               PHYSICAL EXAM:      Physical Exam:  Visit Vitals  /64   Pulse 64   Ht 5' 9\" (1.753 m)   Wt 149 lb 3.2 oz (67.7 kg)   BMI 22.03 kg/m²       Patient appears generally well, mood and affect are appropriate and pleasant. HEENT:  Hearing intact, non-icteric, normocephalic, atraumatic.    Neck Exam: Supple, + bilat bruits   Lung Exam: Clear ant   Cardiac Exam: Regular rate and rhythm with 2/6 systolic murmur (less audible sitting up)  Abdomen: Soft, non-tender, obese   Extremities: Moves all ext well. No edema  Psych: Appropriate affect  Neuro - Grossly intact           LABS / OTHER STUDIES:          Component      Latest Ref Rng & Units 11/19/2018 11/19/2018           4:45 AM  4:45 AM   Source       BLOOD     Renin Activity      0.167 - 5.380 ng/mL/hr 1.993     Aldosterone      0.0 - 30.0 ng/dL 10.7     Normetanephrine, free      0 - 145 pg/mL   130   Metanephrine, free      0 - 62 pg/mL   59      Lab Results   Component Value Date/Time    Sodium 143 11/21/2018 01:18 AM    Potassium 3.5 11/21/2018 01:18 AM    Chloride 106 11/21/2018 01:18 AM    CO2 28 11/21/2018 01:18 AM    Anion gap 9 11/21/2018 01:18 AM    Glucose 148 (H) 11/21/2018 01:18 AM    BUN 71 (H) 11/21/2018 01:18 AM    Creatinine 2.19 (H) 11/21/2018 01:18 AM    BUN/Creatinine ratio 32 (H) 11/21/2018 01:18 AM    GFR est AA 37 (L) 11/21/2018 01:18 AM    GFR est non-AA 30 (L) 11/21/2018 01:18 AM    Calcium 8.0 (L) 11/21/2018 01:18 AM       Lab Results   Component Value Date/Time    WBC 15.4 (H) 11/21/2018 01:18 AM    Hemoglobin (POC) 15.0 09/05/2010 08:30 AM    HGB 11.3 (L) 11/21/2018 01:18 AM    Hematocrit (POC) 44 09/05/2010 08:30 AM    HCT 34.6 (L) 11/21/2018 01:18 AM    PLATELET 186 38/98/4857 01:18 AM    MCV 91.5 11/21/2018 01:18 AM             CARDIAC DIAGNOSTICS:      Cardiac Evaluation Includes:         Cath (8/3/12): LM ok, 50% mid LAD, mid 70% stenosis in LCX.  RCA is dominant and completely occluded proximally (filled by collaterals)   -->  He received 2 stents in the LCX. (promus)     KELLEY's - 8/10/13: Right ankle arm 0.72 and left 0.52     Echo 8/13 - EF 55% mild LAE, modeerate Mr     Echo (4/17/15) - LVEF 55%  There was akinesis of the basal inferior wall(s). There was severe hypokinesis of the basal inferoseptal and basal inferolateral wall(s).   Mod-severe MR.       Carotid Doppler 4/15 -  16-49% stenosis bilaterally     Lexiscan Cardiolite 3/16 - partially reversible inferior wall defect.  SSS = 5, SRS = 1, SDS - 4.  LVEF 54%     Echo 3/31/16 - LVEF 55-60%, akinesis of inferior wall.  Severe MR.      Echo 2/14/17 - LVEF 55-60%, LAE.  Neg bubble study. Mild MR.      Carotid Doppler 2/17 - mild disease bilat      Echo 11/17/18 - Mod-marked LVH. LVEF 50 %. There was hypokinesis of the basal inferior wall(s). Grade 2 diastolic dysfunction. RV normal.  Mod LAE. Mild MAC and mild MR.       Renal artery dopplers 11/18/18 - kidney size normal.  Bilateral intrinsic/medical renal disease identified based on the R. Is. The bilateral renal arteries were not visualized.     EKG 11/17/18 - NSR, LVH, PRWP     CXR 11/17/18 - no acute process, calcified right pleural plaque               ASSESSMENT AND PLAN:         Assessment and Plan:  Mr. Mix is a 58 y.o.   male with a history of CAD, CKD, HTN, CVA who presents to hospital on 11/17/18 complaining of SOB and chest pain.  He's had FIGUEROA for several weeks with orthopnea as well.  His blood pressure on admission was 240/97.     1) Hypertensive Urgency 11/18 and HFpEF  - Suspect a lot of his problems (diastolic HF, angina, elevated trop) could be due to uncontrolled HTN. - Renal US 11/18 was unable to visualize arteries due to bowel gas   - Kyree/renin ratio and serum metanephrines were normal   - Have switched BB to Bystolic (given his wheezing issues and concern of bronchospasm with other BB) (however this change has not improved SOB any)   - On 11/27/18 - BP remains high. Will increase Bystolic to 10 mg daily. Will increase Bumex to 1 mg daily.    - On 12/11/18 - BP remains high despite meds but swelling a lot better.   I spoke to Dr. Barbara Thomas - he feels HTN due to CKD in part. Recommended adding minoxidil 2.5 mg BID (hold off on hydralazine for now). Repeat labs with Dr. Khoa Rios. Retry getting renal artery dopplers next visit. Reduce ASA to 81 mg daily.    2) CAD and chest pain; elevated troponin    - he has known CAD with his RCA chronically occluded. - Will treat medically for CAD and control his BP first  - consider cath if he continues to have anginal symptoms   - Cont statin, ASA, BB, Imdur     - smoking cessation advised   - On 11/27/18 and 12/11/18 - he denies any chest pain      3) Pulmonary plaquing d/t Asbestos  - Has seen [facility]- plan was for PFTs OP     4) Moderate Carotid artery disease   - statin, ASA,   - serial carotid studies      5) History of MR  - loud murmur at apex (positional ) but mild MR per recent echo  --> follow on serial studies      6) SOPHIA/CKD (presumed FSGS)  - nephrology following    - To see Dr. Khoa Rios next week.    7) Smoking cessation discussed  - he wants to try Wellbutrin - will write for that    8) See me in 2-3 weeks. Patient expressed understanding of the plan - questions were answered. Retired (from [employer] / [employer]). 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          Donte Patterson MD, [address] [place], [address]      [id number] [place] [address]  [address], [address] [facility], [address] [place] [address]  Ph: [phone]                               [phone]      ADDENDUM   12/16/2018  Labs 3/2/18 - CMP OK, Cr 1.1, chol 214, , , HDL 44

## 2018-12-12 ENCOUNTER — TELEPHONE (OUTPATIENT)
Dept: CARDIOLOGY CLINIC | Age: 63
End: 2018-12-12

## 2018-12-12 LAB
ALBUMIN SERPL-MCNC: 3.6 G/DL (ref 3.6–4.8)
BUN SERPL-MCNC: 49 MG/DL (ref 8–27)
BUN/CREAT SERPL: 25 (ref 10–24)
CALCIUM SERPL-MCNC: 8.4 MG/DL (ref 8.6–10.2)
CHLORIDE SERPL-SCNC: 95 MMOL/L (ref 96–106)
CO2 SERPL-SCNC: 25 MMOL/L (ref 20–29)
CREAT SERPL-MCNC: 1.99 MG/DL (ref 0.76–1.27)
GLUCOSE SERPL-MCNC: 657 MG/DL (ref 65–99)
INTERPRETATION: NORMAL
PHOSPHATE SERPL-MCNC: 3 MG/DL (ref 2.5–4.5)
POTASSIUM SERPL-SCNC: 5 MMOL/L (ref 3.5–5.2)
SODIUM SERPL-SCNC: 134 MMOL/L (ref 134–144)

## 2018-12-12 NOTE — TELEPHONE ENCOUNTER
Call rec'd from [facility].     Glucose 657. Verbally notified Dr Andrey Landau. Called Dr Spear Nip office and left a message on the v mail of the NP Annika Mclain?) Since his nurse was unavailable. Phone number for IOB provided.

## 2019-01-03 ENCOUNTER — OFFICE VISIT (OUTPATIENT)
Dept: CARDIOLOGY CLINIC | Age: 64
End: 2019-01-03

## 2019-01-03 ENCOUNTER — CLINICAL SUPPORT (OUTPATIENT)
Dept: CARDIOLOGY CLINIC | Age: 64
End: 2019-01-03

## 2019-01-03 VITALS
HEIGHT: 69 IN | BODY MASS INDEX: 21.62 KG/M2 | DIASTOLIC BLOOD PRESSURE: 70 MMHG | WEIGHT: 146 LBS | HEART RATE: 62 BPM | RESPIRATION RATE: 16 BRPM | SYSTOLIC BLOOD PRESSURE: 145 MMHG

## 2019-01-03 DIAGNOSIS — I10 ESSENTIAL HYPERTENSION: Primary | ICD-10-CM

## 2019-01-03 DIAGNOSIS — R79.89 ELEVATED SERUM CREATININE: ICD-10-CM

## 2019-01-03 DIAGNOSIS — I50.30 (HFPEF) HEART FAILURE WITH PRESERVED EJECTION FRACTION (HCC): ICD-10-CM

## 2019-01-03 DIAGNOSIS — I10 ESSENTIAL HYPERTENSION: Primary | Chronic | ICD-10-CM

## 2019-01-03 DIAGNOSIS — I25.10 CORONARY ARTERY DISEASE INVOLVING NATIVE CORONARY ARTERY OF NATIVE HEART WITHOUT ANGINA PECTORIS: ICD-10-CM

## 2019-01-03 RX ORDER — MINOXIDIL 2.5 MG/1
5 TABLET ORAL 2 TIMES DAILY
Qty: 120 TAB | Refills: 6 | Status: SHIPPED | OUTPATIENT
Start: 2019-01-03

## 2019-01-03 RX ORDER — CARVEDILOL 6.25 MG/1
6.25 TABLET ORAL 2 TIMES DAILY WITH MEALS
Qty: 60 TAB | Refills: 12 | Status: SHIPPED | OUTPATIENT
Start: 2019-01-03

## 2019-01-03 NOTE — PROGRESS NOTES
Conception MD Claritza. [facility] [place]              Patient: Yimi Taylor  : 1955      Today's Date: 1/3/2019            HISTORY OF PRESENT ILLNESS:     History of Present Illness:  Here for follow-up. Feels fine other than a cough. No dizziness or chest pain. BP remains high at home -200 sometimes. PAST MEDICAL HISTORY:     Past Medical History:   Diagnosis Date    (HFpEF) heart failure with preserved ejection fraction ([address] 2018    Acute CVA (cerebrovascular accident) ([address] 2/15/2017    Per clinical exam    CAD (coronary artery disease)     MI ; stenting x 2 in      Carotid artery disease ([address]     Cough     Diplopia 2017    DM (diabetes mellitus), type 2 ([address] 2018    Fatigue     FSGS (focal segmental glomerulosclerosis)     Headache     History of CVA (cerebrovascular accident) 2017    CT head: chronic L BG infarct    Hyperlipidemia     Hypertension     Mitral regurgitation     Mod-severe MR in     Muscle weakness     PVD (peripheral vascular disease) ([address]        Past Surgical History:   Procedure Laterality Date    CARDIAC SURG PROCEDURE UNLIST      4 cardiac stents, 3 stens to L leg, 2 stens R leg, 1 stent to abdomen    HX MOHS PROCEDURES Right 2015    HX ORTHOPAEDIC Left 2015    rotator cuff manipulation    NEUROLOGICAL PROCEDURE UNLISTED      EMG         MEDICATIONS:     Current Outpatient Medications   Medication Sig Dispense Refill    aspirin 81 mg chewable tablet Take 1 Tab by mouth daily. 30 Tab 12    minoxidil (LONITEN) 2.5 mg tablet Take 1 Tab by mouth two (2) times a day. 60 Tab 6    Lancets misc For Accucheck 1 Each 0    glucose blood VI test strips (ASCENSIA AUTODISC VI, ONE TOUCH ULTRA TEST VI) strip For Accucheck 50 Strip 0    bumetanide (BUMEX) 1 mg tablet Take 1 Tab by mouth daily. 30 Tab 12    nebivolol (BYSTOLIC) 10 mg tablet Take 1 Tab by mouth daily.  42 Tab 0    cloNIDine HCl (CATAPRES) 0.1 mg tablet Take 1 Tab by mouth every twelve (12) hours. 60 Tab 1    isosorbide mononitrate ER (IMDUR) 60 mg CR tablet Take 1 Tab by mouth daily. 30 Tab 1    predniSONE (DELTASONE) 10 mg tablet 40 mg TWICE a day for 3 days, then 40 mg once a day and wean further after follow up with Dr. Jennie Pham 60 Tab 0    Blood-Glucose Meter monitoring kit Check blood sugar twice a day 1 Kit 0    budesonide-formoterol (SYMBICORT) 80-4.5 mcg/actuation HFAA Take 2 Puffs by inhalation two (2) times a day. 1 Inhaler 0    ipratropium-albuterol (COMBIVENT RESPIMAT)  mcg/actuation inhaler Take 1 Puff by inhalation every six (6) hours. 1 Inhaler 0    atorvastatin (LIPITOR) 80 mg tablet Take 80 mg by mouth daily.  amLODIPine (NORVASC) 10 mg tablet Take 1 Tab by mouth daily. 30 Tab 1    buPROPion (WELLBUTRIN) 100 mg tablet 150 mg once daily for 3 days; then can increase to 150 mg twice daily if tolerated (Patient not taking: Reported on 1/3/2019) 90 Tab 3     Allergies   Allergen Reactions    Ace Inhibitors Cough           SOCIAL HISTORY:     Social History     Tobacco Use    Smoking status: Current Every Day Smoker     Packs/day: 1.00     Types: Cigarettes     Last attempt to quit: 2016     Years since quittin.8    Smokeless tobacco: Current User    Tobacco comment: 1 pack/day (2019)   Substance Use Topics    Alcohol use: Yes     Alcohol/week: 12.0 oz     Types: 24 Cans of beer per week     Comment: socially    Drug use: No         FAMILY HISTORY:     Family History   Problem Relation Age of Onset    Heart Disease Brother               REVIEW OF SYMPTOMS:      Review of Symptoms:  Constitutional: Negative for fever, chills  HEENT: Negative for nosebleeds, tinnitus, and vision changes. Respiratory: + occ cough (brown sputum mild)  Cardiovascular: No orthopnea or PND  Gastrointestinal: Negative for abdominal pain, diarrhea, melena. Genitourinary: Negative for dysuria  Musculoskeletal: Negative for myalgias. Skin: Negative for rash  Heme: No problems bleeding. Neurological: Negative for speech change and focal weakness.               PHYSICAL EXAM:      Physical Exam:  Visit Vitals  /70 (BP 1 Location: Right arm, BP Patient Position: Sitting)   Pulse 62   Resp 16   Ht 5' 9\" (1.753 m)   Wt 146 lb (66.2 kg)   BMI 21.56 kg/m²          Patient appears generally well, mood and affect are appropriate and pleasant. HEENT:  Hearing intact, non-icteric, normocephalic, atraumatic.    Neck Exam: Supple, + bilat bruits   Lung Exam: Clear ant with mild exp wheezes   Cardiac Exam: Regular rate and rhythm XBPQ 5/7 systolic murmur (less audible sitting up)  Abdomen: Soft, non-tender, obese   Extremities: Moves all ext well. No edema  Psych: Appropriate affect  Neuro - Grossly intact           LABS / OTHER STUDIES:            Component      Latest Ref Rng & Units 11/19/2018 11/19/2018           4:45 AM  4:45 AM   Source       BLOOD     Renin Activity      0.167 - 5.380 ng/mL/hr 1.993     Aldosterone      0.0 - 30.0 ng/dL 10.7     Normetanephrine, free      0 - 145 pg/mL   130   Metanephrine, free      0 - 62 pg/mL   59            Lab Results   Component Value Date/Time     Sodium 143 11/21/2018 01:18 AM     Potassium 3.5 11/21/2018 01:18 AM     Chloride 106 11/21/2018 01:18 AM     CO2 28 11/21/2018 01:18 AM     Anion gap 9 11/21/2018 01:18 AM     Glucose 148 (H) 11/21/2018 01:18 AM     BUN 71 (H) 11/21/2018 01:18 AM     Creatinine 2.19 (H) 11/21/2018 01:18 AM     BUN/Creatinine ratio 32 (H) 11/21/2018 01:18 AM     GFR est AA 37 (L) 11/21/2018 01:18 AM     GFR est non-AA 30 (L) 11/21/2018 01:18 AM     Calcium 8.0 (L) 11/21/2018 01:18 AM               Lab Results   Component Value Date/Time     WBC 15.4 (H) 11/21/2018 01:18 AM     Hemoglobin (POC) 15.0 09/05/2010 08:30 AM     HGB 11.3 (L) 11/21/2018 01:18 AM     Hematocrit (POC) 44 09/05/2010 08:30 AM     HCT 34.6 (L) 11/21/2018 01:18 AM     PLATELET 611 45/23/6766 01:18 AM     MCV 91.5 11/21/2018 01:18 AM      Labs 3/2/18 - CMP OK, Cr 1.1, chol 214, , , HDL 44    Lab Results   Component Value Date/Time    Sodium 134 12/11/2018 01:56 PM    Potassium 5.0 12/11/2018 01:56 PM    Chloride 95 (L) 12/11/2018 01:56 PM    CO2 25 12/11/2018 01:56 PM    Anion gap 9 11/21/2018 01:18 AM    Glucose 657 (>) 12/11/2018 01:56 PM    BUN 49 (H) 12/11/2018 01:56 PM    Creatinine 1.99 (H) 12/11/2018 01:56 PM    BUN/Creatinine ratio 25 (H) 12/11/2018 01:56 PM    GFR est AA 40 (L) 12/11/2018 01:56 PM    GFR est non-AA 34 (L) 12/11/2018 01:56 PM    Calcium 8.4 (L) 12/11/2018 01:56 PM                CARDIAC DIAGNOSTICS:      Cardiac Evaluation Includes:         Cath (8/3/12): LM ok, 50% mid LAD, mid 70% stenosis in LCX.  RCA is dominant and completely occluded proximally (filled by collaterals)   -->  He received 2 stents in the LCX. (promKAICORE)     KELLEY's - 8/10/13: Right ankle arm 0.72 and left 0.52     Echo 8/13 - EF 55% mild LAE, modeerate Mr     Echo (4/17/15) - LVEF 55%  There was akinesis of the basal inferior wall(s). There was severe hypokinesis of the basal inferoseptal and basal inferolateral wall(s).   Mod-severe MR.       Carotid Doppler 4/15 -  16-49% stenosis bilaterally     Lexiscan Cardiolite 3/16 - partially reversible inferior wall defect.  SSS = 5, SRS = 1, SDS - 4.  LVEF 54%     Echo 3/31/16 - LVEF 55-60%, akinesis of inferior wall.  Severe MR.      Echo 2/14/17 - LVEF 55-60%, LAE.  Neg bubble study. Mild MR.      Carotid Doppler 2/17 - mild disease bilat      Echo 11/17/18 - Mod-marked LVH.  LVEF 50 %. There was hypokinesis of the basal inferior wall(s). Grade 2 diastolic dysfunction.  RV normal.  Mod LAE.  Mild MAC and mild MR.       Renal artery dopplers 11/18/18 - kidney size normal.  Bilateral intrinsic/medical renal disease identified based on the R. Is.    The bilateral renal arteries were not visualized.     EKG 11/17/18 - NSR, LVH, PRWP     CXR 11/17/18 - no acute process, calcified right pleural plaque      Renal Artery Dopplers 1/3/19 - no ESTEFANY            ASSESSMENT AND PLAN:         Assessment and Plan:  Mr. Mix is a 58 y.o.   male with a history of CAD, CKD, HTN, CVA who presents to hospital on 11/17/18 complaining of SOB and chest pain.  He's had FIGUEROA for several weeks with orthopnea as well.  His blood pressure on admission was 240/97.     1) Hypertensive Urgency 11/18 and HFpEF  - Suspect a lot of his problems (diastolic HF, angina, elevated trop) could be due to uncontrolled HTN. - Renal US 11/18 was unable to visualize arteries due to bowel gas   - Kyree/renin ratio and serum metanephrines were normal   - Have switched BB to Bystolic (given his wheezing issues and concern of bronchospasm with other BB) (however this change has not improved SOB any)   - On 11/27/18 - BP remains high. Will increase Bystolic to 10 mg daily.  Will increase Bumex to 1 mg daily.    - On 12/11/18 - BP remains high despite meds but swelling a lot better. I spoke to Dr. Jed Leal - he feels HTN due to CKD in part. Recommended adding minoxidil 2.5 mg BID (hold off on hydralazine for now). Repeat labs with Dr. Jed Leal. - On 1/3/19 - he says BP remains high at home (-200). On my check /70. Will increase minoxidil to 5 mg BID. Will switch Bystolic to Coreg 1.20 mg BID. 2) CAD and chest pain; elevated troponin    - he has known CAD with his RCA chronically occluded.    - Will treat medically for CAD and control his BP first  - consider cath if he continues to have anginal symptoms   - Cont statin, ASA, BB, Imdur     - smoking cessation advised   - On 11/27/18 and 12/11/18 - he denies any chest pain      3) Pulmonary plaquing d/t Asbestos  - Has seen pulmonary - plan was for PFTs OP     4) Moderate Carotid artery disease   - statin, ASA,   - serial carotid studies      5) History of MR  - loud murmur at apex (positional ) but mild MR per recent echo  --> follow on serial studies    6) SOPHIA/CKD (presumed FSGS)  - nephrology following  (Dr. Juan Batista     7) Smoking cessation discussed  - he wants to try Wellbutrin - will write for that     8) See me in 4 weeks.  Patient expressed understanding of the plan - questions were answered.      Retired (from [employer] / [employer], [place], [employer] by [employer]).   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          Katerina Yousif MD, [facility] [facility] [address]  [address] [place] [address], [address]      [address] [facility]  [address]  [facility], [address] [place] [address]  Ph: [phone]                                [phone]

## 2019-01-03 NOTE — PROCEDURES
[facility]  *** FINAL REPORT ***    Name: Letitia Bauer  MRN: [id number]       Outpatient  : 22 Aug 1955  HIS Order #: [id number]  [address] [facility] Visit #: [id number]  Date: 2019    TYPE OF TEST: Visceral Arterial Duplex    REASON FOR TEST  Eval for renal vascular HTN    Aortic PSV:  69.0 cm/s  Diameter AP: 2.3 cm   TV: 2.3 cm                   Right          Left  Renal Artery:- -------------  -------------  Proximal  PSV:  92.0           72.0  Mid       PSV: 124.0          100.0  Distal    PSV:  77.0           90.0  Aortic ratio :   1.8            1.4    Medullary PSV:            EDV:            EDR:            SDR:    Cortical  PSV:            EDV:            EDR:            SDR:  Stenosis:      Normal         Normal  Kidney size:   10.7 cm        10.8 cm               x  4.1 cm      x  5.4 cm    Hilar:-        Right          Left  Acc. Time  AT:     secs           secs  Acc. Index AI:             RI:    Mesenteric:-                  Prox   Mid   Dist Ratio Stenosis          Aneurysm                  ----- ----- ----- ----- ----------------- ------------  SMA:  Celiac:  Hepatic:  Splenic:  RIDGE:  :    INTERPRETATION/FINDINGS  PROCEDURE:  Evaluation of the bilateral renal arteries with grayscale,   color flow and spectral Doppler. FINDINGS:  The right and left renal arteries appear patent by color  flow imaging. There are no focal increases in velocity detected. Kidney size is normal bilaterally. IMPRESSION:  1)  No evidence of right or left renal artery stenosis. 2)  Kidney size is normal bilaterally. ADDITIONAL COMMENTS    I have personally reviewed the data relevant to the interpretation of  this  study.     TECHNOLOGIST: CRISTO Chandler  Signed: 2019 11:08 AM    PHYSICIAN: Indio Mcgill MD, [facility]  Signed: 2019 04:11 PM

## 2019-01-03 NOTE — PROGRESS NOTES
Chief Complaint   Patient presents with    Other     Heart Failure    Hypertension    Coronary Artery Disease    Leg Swelling     Visit Vitals  /64 (BP 1 Location: Right arm, BP Patient Position: Sitting)   Pulse 62   Resp 16   Ht 5' 9\" (1.753 m)   Wt 146 lb (66.2 kg)   BMI 21.56 kg/m²

## 2021-08-03 PROBLEM — I10 HYPERTENSION: Status: RESOLVED | Noted: 2021-08-03 | Resolved: 2021-08-03
